# Patient Record
Sex: FEMALE | Race: WHITE | NOT HISPANIC OR LATINO | Employment: OTHER | URBAN - METROPOLITAN AREA
[De-identification: names, ages, dates, MRNs, and addresses within clinical notes are randomized per-mention and may not be internally consistent; named-entity substitution may affect disease eponyms.]

---

## 2019-03-08 ENCOUNTER — DOCUMENTATION ONLY (OUTPATIENT)
Dept: CARE COORDINATION | Facility: CLINIC | Age: 64
End: 2019-03-08

## 2019-03-22 ENCOUNTER — HEALTH MAINTENANCE LETTER (OUTPATIENT)
Age: 64
End: 2019-03-22

## 2019-03-22 ASSESSMENT — ENCOUNTER SYMPTOMS
ARTHRALGIAS: 1
BACK PAIN: 1

## 2019-04-02 NOTE — PROGRESS NOTES
[facility]  Primary Care Center   Established Patient Encounter   Debi Pizarro MD  04/04/2019     Medicare Annual Wellness Visit     History of Present Illness:   Elisha Gao is a 63 year old female who presents for a medicare wellness check. She has previously undergone a hysterectomy in 1987 for endometriosis and a ruptured ovarian cyst.     The patient reports she is doing well. She has had chronic back pain that has worsened over the past few years with no specific incident or trauma. She mostly has nighttime pain that wakes her up at night. She is unable to lift heavy objects. Her pain is tolerable. She notes that her family has a history of chronic back pain.     She is currently taking Celexa for depression  Activities she enjoys include going up to her cabin, golfing and reading. She has gone on a low carb diet over the past two months and has lost 15 pounds. She receives annual flu shots. She denies any fevers or night sweats.     Review of Systems:   Constitutional:   fevers, night sweats or unintentional weight change ?  NO      Eyes:   vision change, diplopia or red eyes?  NO      Ears, Nose, Mouth, Throat:   tinnitus or hearing change,  epistaxis or nasal discharge,  oral lesions, throat pain ?  NO      Neck:   stiffness?  NO           Cardiovascular:   chest pain, palpitations, or pain with walking, orthopnea or PND?  NO   Breasts:  Any bumps or unusual discharge?     NO         Respiratory:   dyspnea, cough, shortness of breath or wheezing?  NO         GI:   nausea, vomiting, diarrhea or constipation,  abdominal pain ?  NO         :   change in urine,  dysuria or hematuria,  sexual dysfunction ?  NO        Musculoskeletal:   joint or muscle pain or swelling? YES, low back pain          Skin:   concerning lesions or moles?  NO           Nervous System:   loss of strength or sensation,  numbness or tingling,  tremor,  dizziness,  headache?  NO   Endocrine/Hormone:   polyuria or polydipsia,   temperature intolerance?  NO            Blood and Lymphnodes:   concerning bumps,  bleeding problems?  NO            Allergy:   environmental allergies?  NO            Mental Health:   depression or anxiety,  sleep problems?  NO              Medical Care      Have you been to an ER or a hospital in the last year? No  What other specialists or organizations are involved in your medical care?  No  Current providers sharing in care for this patient include:  Patient Care Team:  Hayes Marshall MD as PCP - General (Family Practice)    Active Medications:     Current Outpatient Medications:      citalopram (CELEXA) 40 MG tablet, Take 40 mg by mouth, Disp: , Rfl:      magnesium 250 MG tablet, Take 1 tablet by mouth daily, Disp: , Rfl:      vitamin D3 (CHOLECALCIFEROL) 1000 units (25 mcg) tablet, Take 1,000 Units by mouth daily, Disp: , Rfl:       Allergies:   Patient has no known allergies.      Past Medical History:  No past medical history on file.     Past Surgical History:  Hysterectomy - 1987    Family History:   Father - heart attack, age 62  Mother - colon cancer (70's) and lung cancer (90's)  Brother - bipolar disorder      Social History:   Patient presents in clinic alone   Admits to prior tobacco use (quit in 1987, 0.5 packs/day for 20 years)  Admits to 3 glasses of wine/week.  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      Marital Status:  Who lives in your household? wife  Does your home have any of the following safety concerns? Loose rugs in the hallway, no grab bars in the bathroom, no handrails on the stairs or have poorly lit areas?  No  Do you feel threatened or controlled by a partner, ex-partner or anyone in your life? No  Has anyone hurt you physically, for example by pushing, hitting, slapping or kicking you   or forcing you to have sex? No  Do you need help with the phone, transportation, shopping, preparing meals, housework, laundry, medications or managing money? No   Have you noticed  "any hearing difficulties? No      Risk Behaviors and Healthy Habits      How many servings of fruits and vegetables do you eat a day? 2 serving of both.    How often do you exercise and what do you do? 5 days a week, cardio and strength at gym  Do you frequently ride without a seatbelt? Yes   Do you use tobacco?  No  Do you use any other drugs? No                                                                                                             Do you use alcohol? Yes, three glasses of wine a week    Today's PHQ-2 Score:       Sexual Health      Are you sexually active?  Yes    If yes, with men, women, or both? Female  If yes, do you more than one current partner?No  If yes, are you using condoms?  No  Have you had any sexually transmitted infections? No   Any sexual concerns? No      FOR WOMEN  What year did you stop having periods? age 30   Any vaginal bleeding in the last year? No  Have you ever had an abnormal Pap smear? No     FUNCTIONAL ABILITY/SAFETY SCREENING      Was the patient's timed Up & Go test (Get up from chair walk, 10 feet turn, return to chair and sit down) unsteady or longer than 30 seconds? No     Hearing evaluation if done: N/A     EVALUATION OF COGNITIVE FUNCTION      Mood/affect:Normal  Appearance:Normal  Family member/caregiver input: Normal  Mini Cog Scoring   3 points  Clock Draw Test result:  Normal     SCREENING FOR PREVENTION and EARLY DETECTION      ECG: Not indicated.      Corrected Visual acuity:See nursing notes     CV Risk based on Pooled Cohort Risk:The ASCVD Risk score ([place] NESSA Jr, et al., 2013) failed to calculate for the following reasons:    The 2013 ASCVD risk score is only valid for ages 40 to 79     Advanced Directives: Not discussed     There is no immunization history on file for this patient.    Physical Exam:   /88   Pulse 78   Ht 1.635 m (5' 4.37\")   Wt 55.6 kg (122 lb 8 oz)   SpO2 97%   BMI 20.79 kg/m     Constitutional: Alert, oriented, pleasant, " no acute distress  Head: Normocephalic, atraumatic  Eyes: Extra-ocular movements intact, no scleral icterus  ENT: Oropharynx clear, moist mucus membranes, good dentition  Neck: Supple, no lymphadenopathy, no thyromegaly   Cardiovascular: Regular rate and rhythm, no murmurs, rubs or gallops, peripheral pulses full/symmetric  Respiratory: Good air movement bilaterally, lungs clear, no wheezes/rales/rhonchi  GI: Abdomen soft, bowel sounds present, nondistended, nontender, no organomegaly or masses, no rebound/guarding  Musculoskeletal: No edema, normal muscle tone, normal gait, subjective tenderness over lower thoracic and upper lumbar spine without radiation or focal abnormalities, straight leg raise test negative bilaterally.   Neurologic: Alert and oriented, cranial nerves 2-12 intact.  Skin: No rashes/lesions  Psychiatric: normal mentation, affect and mood    Assessment and Plan:  Initial Medicare Wellness Visit       ICD-10-CM    1. Routine general medical examination at a health care facility Z00.00 CBC with platelets     Comprehensive metabolic panel     TSH with free T4 reflex   2. Screening for hyperlipidemia Z13.220 Lipid panel reflex to direct LDL Fasting   3. Screening for diabetes mellitus Z13.1 Hemoglobin A1c   4. Chronic midline low back pain without sciatica M54.5 XR Thoracic Spine 2 Views    G89.29 XR Lumbar Spine 2-3 Views   5. Major depressive disorder in partial remission, unspecified whether recurrent (H) F32.4 citalopram (CELEXA) 40 MG tablet   6. Screening for osteoporosis Z13.820 Dexa hip/pelvis/spine*   7. Asymptomatic postmenopausal status Z78.0 Dexa hip/pelvis/spine*   8. Special screening for malignant neoplasms, colon Z12.11 GASTROENTEROLOGY ADULT REF PROCEDURE ONLY      PLAN:  1. Reviewed Preventive Services with patient and preventive service plan is as below., Lipid panel ordered. , Dexa Scan ordered., Mammogram ordered. and Colonoscopy ordered.   2. Lower back pain, chronic for two  years without preceding trauma or red flag symptoms. Will start with imaging patient is amenable to sports medicine or physical therapy referral as appropriate.     #Routine Health Maintenence:  Depression Screening:   PHQ-2 ( 1999 Pfizer) 4/4/2019   Q1: Little interest or pleasure in doing things 0   Q2: Feeling down, depressed or hopeless 0   PHQ-2 Score 0   Q1: Little interest or pleasure in doing things Not at all   Q2: Feeling down, depressed or hopeless Not at all   PHQ-2 Score 0     Immunizations (zoster, pneumovax, flu, Tdap, Hep A/B):   Lung Ca Screening (>30 pk age 55-79 or >20 py age 50-79 + RF): does not qualify  Colonoscopy (50-75 yrs): 2012, due 5 years  Dexa (>65W or 70M yrs): 12/11 (on meds for 6 months)  RESULT: LUMBAR SPINE  BMD (gm/cm2): 0.959  T score: -0.88  RESULT: HIP  BMD (gm/cm2): 0.724  T score: -2.25  FRAX RISK CALCULATION    10 YEAR TOTAL FRACTURE RISK 7 %   10 YEAR HIP FRACTURE RISK 1 %  Mammogram (40-75 yrs): done today  Pap (21-65 yrs): not indicated  GC/Chlam (<25 yrs): not indicated  HIV/HCV if risk factors: ordered  Tob/EtOH: screen neg  Lifestyle factors: reviewed  Advanced Directive: deferred        Follow-up: Return to clinic in annually or PRN. .      Radiograph of the lower back to evaluate the alignment  compression fracture. IF the radiograph is normal, I will refer her to sports medicine for further evaluation.      Scribe Disclosure:   I, Leander Burton, am serving as a scribe to document services personally performed by Debi Pizarro MD at this visit, based upon the provider's statements to me. All documentation has been reviewed by the aforementioned provider prior to being entered into the official medical record.     Portions of this medical record were completed by a scribe. UPON MY REVIEW AND AUTHENTICATION BY ELECTRONIC SIGNATURE, this confirms (a) I performed the applicable clinical services, and (b) the record is accurate.  Debi Pizarro MD  Internal  Medicine      Answers for HPI/ROS submitted by the patient on 3/22/2019   General Symptoms: No  Skin Symptoms: No  HENT Symptoms: No  EYE SYMPTOMS: No  HEART SYMPTOMS: No  LUNG SYMPTOMS: No  INTESTINAL SYMPTOMS: No  URINARY SYMPTOMS: No  GYNECOLOGIC SYMPTOMS: No  BREAST SYMPTOMS: No  SKELETAL SYMPTOMS: Yes  BLOOD SYMPTOMS: No  NERVOUS SYSTEM SYMPTOMS: No  MENTAL HEALTH SYMPTOMS: No  Back pain: Yes  Joint pain: Yes

## 2019-04-04 ENCOUNTER — ANCILLARY PROCEDURE (OUTPATIENT)
Dept: GENERAL RADIOLOGY | Facility: CLINIC | Age: 64
End: 2019-04-04
Attending: INTERNAL MEDICINE
Payer: MEDICARE

## 2019-04-04 ENCOUNTER — ANCILLARY PROCEDURE (OUTPATIENT)
Dept: MAMMOGRAPHY | Facility: CLINIC | Age: 64
End: 2019-04-04
Payer: MEDICARE

## 2019-04-04 ENCOUNTER — TELEPHONE (OUTPATIENT)
Dept: GASTROENTEROLOGY | Facility: OUTPATIENT CENTER | Age: 64
End: 2019-04-04

## 2019-04-04 ENCOUNTER — ANCILLARY PROCEDURE (OUTPATIENT)
Dept: BONE DENSITY | Facility: CLINIC | Age: 64
End: 2019-04-04
Attending: INTERNAL MEDICINE
Payer: MEDICARE

## 2019-04-04 ENCOUNTER — OFFICE VISIT (OUTPATIENT)
Dept: INTERNAL MEDICINE | Facility: CLINIC | Age: 64
End: 2019-04-04
Payer: MEDICARE

## 2019-04-04 ENCOUNTER — TELEPHONE (OUTPATIENT)
Dept: GASTROENTEROLOGY | Facility: CLINIC | Age: 64
End: 2019-04-04

## 2019-04-04 VITALS
OXYGEN SATURATION: 97 % | BODY MASS INDEX: 20.92 KG/M2 | HEIGHT: 64 IN | HEART RATE: 78 BPM | WEIGHT: 122.5 LBS | SYSTOLIC BLOOD PRESSURE: 125 MMHG | DIASTOLIC BLOOD PRESSURE: 88 MMHG

## 2019-04-04 DIAGNOSIS — Z00.00 ROUTINE GENERAL MEDICAL EXAMINATION AT A HEALTH CARE FACILITY: ICD-10-CM

## 2019-04-04 DIAGNOSIS — Z13.220 SCREENING FOR HYPERLIPIDEMIA: ICD-10-CM

## 2019-04-04 DIAGNOSIS — M54.50 CHRONIC MIDLINE LOW BACK PAIN WITHOUT SCIATICA: ICD-10-CM

## 2019-04-04 DIAGNOSIS — F32.4 MAJOR DEPRESSIVE DISORDER IN PARTIAL REMISSION, UNSPECIFIED WHETHER RECURRENT (H): ICD-10-CM

## 2019-04-04 DIAGNOSIS — Z00.00 ROUTINE GENERAL MEDICAL EXAMINATION AT A HEALTH CARE FACILITY: Primary | ICD-10-CM

## 2019-04-04 DIAGNOSIS — Z13.820 SCREENING FOR OSTEOPOROSIS: ICD-10-CM

## 2019-04-04 DIAGNOSIS — Z78.0 ASYMPTOMATIC POSTMENOPAUSAL STATUS: ICD-10-CM

## 2019-04-04 DIAGNOSIS — G89.29 CHRONIC MIDLINE LOW BACK PAIN WITHOUT SCIATICA: ICD-10-CM

## 2019-04-04 DIAGNOSIS — Z12.11 ENCOUNTER FOR SCREENING COLONOSCOPY: Primary | ICD-10-CM

## 2019-04-04 DIAGNOSIS — Z12.11 SPECIAL SCREENING FOR MALIGNANT NEOPLASMS, COLON: ICD-10-CM

## 2019-04-04 DIAGNOSIS — Z12.31 VISIT FOR SCREENING MAMMOGRAM: ICD-10-CM

## 2019-04-04 DIAGNOSIS — Z13.1 SCREENING FOR DIABETES MELLITUS: ICD-10-CM

## 2019-04-04 LAB
ALBUMIN SERPL-MCNC: 4.1 G/DL (ref 3.4–5)
ALP SERPL-CCNC: 67 U/L (ref 40–150)
ALT SERPL W P-5'-P-CCNC: 17 U/L (ref 0–50)
ANION GAP SERPL CALCULATED.3IONS-SCNC: 8 MMOL/L (ref 3–14)
AST SERPL W P-5'-P-CCNC: 13 U/L (ref 0–45)
BILIRUB SERPL-MCNC: 0.9 MG/DL (ref 0.2–1.3)
BUN SERPL-MCNC: 23 MG/DL (ref 7–30)
CALCIUM SERPL-MCNC: 9.4 MG/DL (ref 8.5–10.1)
CHLORIDE SERPL-SCNC: 106 MMOL/L (ref 94–109)
CHOLEST SERPL-MCNC: 225 MG/DL
CO2 SERPL-SCNC: 24 MMOL/L (ref 20–32)
CREAT SERPL-MCNC: 0.82 MG/DL (ref 0.52–1.04)
ERYTHROCYTE [DISTWIDTH] IN BLOOD BY AUTOMATED COUNT: 13.1 % (ref 10–15)
GFR SERPL CREATININE-BSD FRML MDRD: 75 ML/MIN/{1.73_M2}
GLUCOSE SERPL-MCNC: 87 MG/DL (ref 70–99)
HBA1C MFR BLD: 5.8 % (ref 0–5.6)
HCT VFR BLD AUTO: 43.8 % (ref 35–47)
HDLC SERPL-MCNC: 60 MG/DL
HGB BLD-MCNC: 14.6 G/DL (ref 11.7–15.7)
LDLC SERPL CALC-MCNC: 144 MG/DL
MCH RBC QN AUTO: 31 PG (ref 26.5–33)
MCHC RBC AUTO-ENTMCNC: 33.3 G/DL (ref 31.5–36.5)
MCV RBC AUTO: 93 FL (ref 78–100)
NONHDLC SERPL-MCNC: 166 MG/DL
PLATELET # BLD AUTO: 208 10E9/L (ref 150–450)
POTASSIUM SERPL-SCNC: 4.1 MMOL/L (ref 3.4–5.3)
PROT SERPL-MCNC: 7.2 G/DL (ref 6.8–8.8)
RBC # BLD AUTO: 4.71 10E12/L (ref 3.8–5.2)
SODIUM SERPL-SCNC: 138 MMOL/L (ref 133–144)
TRIGL SERPL-MCNC: 108 MG/DL
TSH SERPL DL<=0.005 MIU/L-ACNC: 2.03 MU/L (ref 0.4–4)
WBC # BLD AUTO: 6.8 10E9/L (ref 4–11)

## 2019-04-04 RX ORDER — MULTIVITAMIN WITH IRON
1 TABLET ORAL EVERY MORNING
COMMUNITY

## 2019-04-04 RX ORDER — CITALOPRAM HYDROBROMIDE 40 MG/1
40 TABLET ORAL
COMMUNITY
Start: 2015-08-05 | End: 2019-04-04

## 2019-04-04 RX ORDER — CITALOPRAM HYDROBROMIDE 40 MG/1
40 TABLET ORAL DAILY
Qty: 90 TABLET | Refills: 3 | Status: SHIPPED | OUTPATIENT
Start: 2019-04-04 | End: 2020-03-25

## 2019-04-04 ASSESSMENT — MIFFLIN-ST. JEOR: SCORE: 1096.53

## 2019-04-04 NOTE — NURSING NOTE
VISION TEST    Right: 20/70  Left: 20/25      Pt stated she is planning on going to the eye doctor soon for an exam.     PRECIOUS Toussaint at 10:35 AM on 4/4/2019

## 2019-04-04 NOTE — PATIENT INSTRUCTIONS
Primary Care Center Phone Number: [phone]   [facility] Medication Refill Request Information:  * Please contact your pharmacy regarding ANY request for medication refills.  ** [facility] Prescription Fax = [phone]  * Please allow 3 business days for routine medication refills.  * Please allow 5 business days for controlled substance medication refills.     Primary Care Center Test Result notification information:  *You will be notified with in 7-10 days of your appointment day regarding the results of your test.  If you are on MyChart you will be notified as soon as the provider has reviewed the results and signed off on them.        Dear patient,    Your health care provider has recommended that you receive the new shingles vaccine  called Shingrix to prevent shingles. Many private pay insurance and Medicare Part D cover Shingrix. However, not all insurance carriers cover the entire cost of the Shingrix vaccine if the vaccine is administered at your primary care clinic.    Prior to receiving the vaccine, we recommend that you call your insurance carrier and  ask them the following questions:    1. Does my insurance cover the Shingrix vaccine and the administration of the  vaccine?  2. What is my co-pay or deductible for the vaccine?  3. Is there a cost difference if I receive the vaccine at my doctor s office or a  Pharmacy?    The clinic cannot determine your insurance benefits. Please call your insurance provider  prior to scheduling an appointment to receive the Shingrix vaccine. If you decide to  receive your vaccine at the Primary Care Center, please call [phone] and  request a nurse-only appointment for the Shingrix vaccine. The vaccine comes in two doses. Your second dose should be 2-6 months after your first Shingrix injections.  Nurse visit hours are available Monday, Wednesday, and Friday from 9-11:00 AM and 1:00-3:00PM.    Sincerely,    The Primary Care Team    Imaging  Dept.  [phone]  ([facility])  [phone]  [place] .... ([place], [place], [place])  [phone]  [place] .... [place] ([facility]) and [place] ([facility])      GI Procedure (Colonoscopy /Upper GI/ Endoscopy)  [phone]  The clinic will call you, but if you have not heard from the clinic please call the number provided.

## 2019-04-04 NOTE — TELEPHONE ENCOUNTER
Patient taking any blood thinners ? No     Heart disease ? Denies     Lung disease ?Denies       Sleep apnea ?Denies     Diabetic ?Denies     Kidney disease ?Denies     Electronic implanted medical devices ?Denies     Are you taking any narcotic pain medication ?  No  What is your daily dosage ?    PTSD ? N/a     Prep instructions reviewed with patient ? Patient declined review.  policy, MAC sedation plan reviewed. Advised patient to have someone stay with her post exam    Pharmacy : [facility]    Indication for procedure : Screening colonoscopy    Referring provider :Debi Pizarro MD     Arrival Time : 1:30 PM

## 2019-04-04 NOTE — NURSING NOTE
Chief Complaint   Patient presents with     Establish Care     Pt is here to establish care with a new PCP     Wellness Visit     Pt is here for a medicare wellness visit       Paula Toussaint EMT at 9:44 AM on 4/4/2019

## 2019-04-04 NOTE — PROGRESS NOTES
#Routine Health Maintenence:  Depression Screening:   PHQ-2 ( 1999 Pfizer) 4/4/2019   Q1: Little interest or pleasure in doing things 0   Q2: Feeling down, depressed or hopeless 0   PHQ-2 Score 0   Q1: Little interest or pleasure in doing things Not at all   Q2: Feeling down, depressed or hopeless Not at all   PHQ-2 Score 0     Immunizations (zoster, pneumovax, flu, Tdap, Hep A/B):   Lung Ca Screening (>30 pk age 55-79 or >20 py age 50-79 + RF): does not qualify  Colonoscopy (50-75 yrs): 5 years colonoscopy 2-12  Dexa (>65W or 70M yrs): 12/11 (on meds for 6 months)  RESULT: LUMBAR SPINE  BMD (gm/cm2): 0.959  T score: -0.88  RESULT: HIP  BMD (gm/cm2): 0.724  T score: -2.25  FRAX RISK CALCULATION    10 YEAR TOTAL FRACTURE RISK 7 %   10 YEAR HIP FRACTURE RISK 1 %  Mammogram (40-75 yrs): done today  Pap (21-65 yrs): not indicated  GC/Chlam (<25 yrs):  HIV/HCV if risk factors:  Tob/EtOH:  Lifestyle factors:  Advanced Directive:      colonscopy  DEXA  xray  Lipid  pcv  shingrix info  refills

## 2019-04-10 ENCOUNTER — APPOINTMENT (OUTPATIENT)
Dept: GASTROENTEROLOGY | Facility: OUTPATIENT CENTER | Age: 64
End: 2019-04-10
Payer: MEDICARE

## 2019-04-10 ENCOUNTER — TRANSFERRED RECORDS (OUTPATIENT)
Dept: HEALTH INFORMATION MANAGEMENT | Facility: CLINIC | Age: 64
End: 2019-04-10

## 2019-06-04 ENCOUNTER — OFFICE VISIT (OUTPATIENT)
Dept: OPHTHALMOLOGY | Facility: CLINIC | Age: 64
End: 2019-06-04
Payer: MEDICARE

## 2019-06-04 DIAGNOSIS — Z98.890 S/P LASIK SURGERY: ICD-10-CM

## 2019-06-04 DIAGNOSIS — H52.4 PRESBYOPIA OF BOTH EYES: Primary | ICD-10-CM

## 2019-06-04 DIAGNOSIS — H02.88A MEIBOMIAN GLAND DYSFUNCTION (MGD) OF UPPER AND LOWER LIDS OF BOTH EYES: ICD-10-CM

## 2019-06-04 DIAGNOSIS — H02.88B MEIBOMIAN GLAND DYSFUNCTION (MGD) OF UPPER AND LOWER LIDS OF BOTH EYES: ICD-10-CM

## 2019-06-04 DIAGNOSIS — H31.002 RETINAL SCAR OF LEFT EYE: ICD-10-CM

## 2019-06-04 ASSESSMENT — SLIT LAMP EXAM - LIDS
COMMENTS: 1+ MGD
COMMENTS: 1+ MGD

## 2019-06-04 ASSESSMENT — CONF VISUAL FIELD
OD_NORMAL: 1
OS_NORMAL: 1
METHOD: COUNTING FINGERS

## 2019-06-04 ASSESSMENT — CUP TO DISC RATIO
OS_RATIO: 0.3
OD_RATIO: 0.3

## 2019-06-04 ASSESSMENT — TONOMETRY
IOP_METHOD: ICARE
OD_IOP_MMHG: 10
OS_IOP_MMHG: 11

## 2019-06-04 ASSESSMENT — VISUAL ACUITY
OD_SC: 20/20
METHOD: SNELLEN - LINEAR
OD_SC+: -
OS_SC: 20/20

## 2019-06-04 ASSESSMENT — REFRACTION_MANIFEST
OD_SPHERE: PLANO
OS_ADD: +2.50
OD_AXIS: 005
OS_SPHERE: PLANO
OD_CYLINDER: +0.25
OD_ADD: +2.50
OS_CYLINDER: SPHERE

## 2019-06-04 ASSESSMENT — EXTERNAL EXAM - LEFT EYE: OS_EXAM: NORMAL

## 2019-06-04 ASSESSMENT — REFRACTION_WEARINGRX
OS_CYLINDER: SPHERE
OD_CYLINDER: SPHERE
OD_SPHERE: +2.50
SPECS_TYPE: SVL-READERS
OS_SPHERE: +2.50

## 2019-06-04 ASSESSMENT — EXTERNAL EXAM - RIGHT EYE: OD_EXAM: NORMAL

## 2019-06-04 NOTE — PROGRESS NOTES
History  HPI     Annual Eye Exam     In left eye.  This started years ago.  It is worse throughout the day.  Since onset it is stable.  Associated symptoms include floaters and headache.  Treatments tried include artificial tears.              Comments     Lots of floaters left eye for several years. No flashes of lights. Wears OTC readers. Headaches/pain of left eye, off and on, ongoing for a few years.  Uses ATs PRN    Ania Rg COT 7:28 AM June 4, 2019             Last edited by Ania Rg on 6/4/2019  7:29 AM. (History)          Assessment/Plan  (H52.4) Presbyopia of both eyes  (primary encounter diagnosis)  Comment: Presbyopia both eyes, minimal distance refractive error  Plan: REFRACTION         Educated patient on condition and clinical findings. Dispensed spectacle prescription for full time wear. Educated patient on possibility of adaptation period, if symptoms do not improve return to clinic for further testing.    (Z98.890) S/P LASIK surgery  Comment: Stable  Plan:  Monitor annually.    (H02.88A,  H02.88B) Meibomian gland dysfunction (MGD) of upper and lower lids of both eyes  Comment: Longstanding irritation left eye > right eye   Plan:  Recommended warm compresses twice each day for ten minutes. Monitor annually, or sooner if symptoms worsen.    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 If symptoms present, return to clinic immediately.    Return to clinic in 1 year for comprehensive eye exam.    Complete documentation of historical and exam elements from today's encounter can  be found in the full encounter summary report (not reduplicated in this progress  note). I personally obtained the chief complaint(s) and history of present illness. I  confirmed and edited as necessary the review of systems, past  medical/surgical  history, family history, social history, and examination findings as documented by  others; and I examined the patient myself. I personally reviewed the relevant tests,  images, and reports as documented above. I formulated and edited as necessary the  assessment and plan and discussed the findings and management plan with the  patient and family.    Scar Mart OD, FAAO

## 2019-06-04 NOTE — NURSING NOTE
Chief Complaints and History of Present Illnesses   Patient presents with     Annual Eye Exam     Chief Complaint(s) and History of Present Illness(es)     Annual Eye Exam     Laterality: left eye    Onset: years ago    Timing: throughout the day    Course: stable    Associated symptoms: floaters and headache    Treatments tried: artificial tears              Comments     Lots of floaters left eye for several years. No flashes of lights. Wears OTC readers. Headaches/pain of left eye, off and on, ongoing for a few years.  Uses ATs DEE Rg COT 7:28 AM June 4, 2019

## 2019-10-04 ENCOUNTER — HEALTH MAINTENANCE LETTER (OUTPATIENT)
Age: 64
End: 2019-10-04

## 2019-11-01 ENCOUNTER — HEALTH MAINTENANCE LETTER (OUTPATIENT)
Age: 64
End: 2019-11-01

## 2020-01-02 NOTE — TELEPHONE ENCOUNTER
DIAGNOSIS: R: Conditions - Back - Chronic Rubina Pain (longer than six weeks), I: [employer], S: [id number], , Seen provider 3 years:No,Previous surgery:No,WC/MVA:No,Seen by other provider:No,:1955,   APPOINTMENT DATE: 1/15   NOTES STATUS DETAILS   OFFICE NOTE from referring provider N/A    OFFICE NOTE from other specialist Internal  Logeais, Debi Rossi MD    19   DISCHARGE SUMMARY from hospital N/A    DISCHARGE REPORT from the ER N/A    OPERATIVE REPORT N/A    MEDICATION LIST Internal    MRI N/A    dexa scan internal 19   XRAYS (IMAGES & REPORTS) Internal 19

## 2020-01-15 ENCOUNTER — OFFICE VISIT (OUTPATIENT)
Dept: ORTHOPEDICS | Facility: CLINIC | Age: 65
End: 2020-01-15
Payer: MEDICARE

## 2020-01-15 ENCOUNTER — PRE VISIT (OUTPATIENT)
Dept: ORTHOPEDICS | Facility: CLINIC | Age: 65
End: 2020-01-15

## 2020-01-15 VITALS — BODY MASS INDEX: 21.51 KG/M2 | HEIGHT: 64 IN | WEIGHT: 126 LBS | RESPIRATION RATE: 16 BRPM

## 2020-01-15 DIAGNOSIS — M85.80 OSTEOPENIA AFTER MENOPAUSE: ICD-10-CM

## 2020-01-15 DIAGNOSIS — M85.80 OSTEOPENIA AFTER MENOPAUSE: Primary | ICD-10-CM

## 2020-01-15 DIAGNOSIS — Z78.0 OSTEOPENIA AFTER MENOPAUSE: Primary | ICD-10-CM

## 2020-01-15 DIAGNOSIS — Z78.0 OSTEOPENIA AFTER MENOPAUSE: ICD-10-CM

## 2020-01-15 DIAGNOSIS — M47.816 LUMBAR SPONDYLOSIS: ICD-10-CM

## 2020-01-15 LAB — DEPRECATED CALCIDIOL+CALCIFEROL SERPL-MC: 34 UG/L (ref 20–75)

## 2020-01-15 PROCEDURE — 82306 VITAMIN D 25 HYDROXY: CPT | Performed by: FAMILY MEDICINE

## 2020-01-15 ASSESSMENT — MIFFLIN-ST. JEOR: SCORE: 1107.4

## 2020-01-15 NOTE — PROGRESS NOTES
Subjective:   Elisha Gao is a 64 year old female who presents low back pain. No KEL. She likes to walk and golf.   Chronic pain for 10 years or more, worse in past 6 months.  Sometimes it's a 10/10.  Worse if landing hard and pain shoots up her back.  Trying to work out but then pays for it over next 2-3 days.  Ibuprofen, didn't have decent insurance, even PT would've cost her.  Mainly walking, started strengthening- joined [employer].  Wakes up 5 times a night if not more.  Takes Ibuprofen in the middle of the night.  Ibuprofen 600mg BID  No compression fractures on Lumbar x-ray, Thoracic x-ray no bony abnormalities.  DEXA low bone density- calcium 1200mg and vit D taken. Vit D 2000 international unit(s)- on for the past 2 months  Diet- milk, dairy, cheese, yogurt, on keto off and on  Mom and brother with hx of back surgeries.  Fosamax- GI upset, can't recall dose, hyst at age 33, no kids, ABD surgery- Hyst, classic incision    Background:   Date of injury: NA   Duration of symptoms: 10 years  Mechanism of Injury: Chronic; Unknown   Aggravating factors: twisting, landing hard off a curb, lifting.   Relieving Factors: rest  Prior Evaluation: Prior Physician Evalutation:  and X-rays    PAST MEDICAL, SOCIAL, SURGICAL AND FAMILY HISTORY: She  has a past medical history of Chronic low back pain and Depression.  She  has a past surgical history that includes Hysterectomy (1987) and Lasik (Bilateral).  Her family history includes Bipolar Disorder in her brother; Colon Cancer in her mother; Heart Disease in her father; Lung Cancer in her mother; Macular Degeneration in her mother.  She reports that she quit smoking about 33 years ago. She has a 10.00 pack-year smoking history. She has never used smokeless tobacco. She reports current alcohol use of about 3.0 standard drinks of alcohol per week.    ALLERGIES: She has No Known Allergies.    CURRENT MEDICATIONS: She has a current medication list which includes  "the following prescription(s): citalopram, magnesium, and vitamin d3.     REVIEW OF SYSTEMS: 10 point review of systems is negative except as noted above.     Exam:   Resp 16   Ht 1.635 m (5' 4.37\")   Wt 57.2 kg (126 lb)   BMI 21.38 kg/m             CONSTITUTIONAL: healthy, alert, mild distress and cooperative  HEAD: Normocephalic. No masses, lesions, tenderness or abnormalities  SKIN: no suspicious lesions or rashes  GAIT: normal  NEUROLOGIC: Non-focal  PSYCHIATRIC: affect normal/bright and mentation appears normal.    MUSCULOSKELETAL: chronic LBP  Tender:  lumbar spinous processes, left para lumbar muscles, right para lumbar muscles  Non-tender:  thoracic spinous processes, left parathoracic muscles, right parathoracic muscles  Range of Motion:  left lateral thoracic bending   decreased, right lateral thoracic bending  decreased, left thoracic rotation  painful, right thoracic rotation  painful, lumbar flexion  full, lumbar extension  decreased, painful  Strength:  able to heel walk, able to toe walk  Special tests:  negative straight leg raises    Hip Exam: Hip ROM full, weakness hip abductors, CORE weakness, painful with bridge         Assessment/Plan:   Pt is a 63 yo white female with PMhx of osteopenia presenting with chronic LBP without radiculopathy    1. Chronic LBP without radiculopathy-   Goal: get back to golf  Recommended physical therapy  If not improving consider MRI lumbar  2.osteopenia-  Patient currently taking 1200 mg of calcium and 2000 international units of vitamin D  DEXA: osteopenia  Side effects to Fosamax in the past, GI upset  Will communicate with primary care provider    RTC 6 weeks  X-RAY INTERPRETATION:   Reviewed imaging  "

## 2020-01-15 NOTE — LETTER
1/15/2020      RE: Elisha Gao  [address] [facility] [place] [address]        Subjective:   Elisha Gao is a 64 year old female who presents low back pain. No KEL. She likes to walk and golf.   Chronic pain for 10 years or more, worse in past 6 months.  Sometimes it's a 10/10.  Worse if landing hard and pain shoots up her back.  Trying to work out but then pays for it over next 2-3 days.  Ibuprofen, didn't have decent insurance, even PT would've cost her.  Mainly walking, started strengthening- joined [employer].  Wakes up 5 times a night if not more.  Takes Ibuprofen in the middle of the night.  Ibuprofen 600mg BID  No compression fractures on Lumbar x-ray, Thoracic x-ray no bony abnormalities.  DEXA low bone density- calcium 1200mg and vit D taken. Vit D 2000 international unit(s)- on for the past 2 months  Diet- milk, dairy, cheese, yogurt, on keto off and on  Mom and brother with hx of back surgeries.  Fosamax- GI upset, can't recall dose, hyst at age 33, no kids, ABD surgery- Hyst, classic incision    Background:   Date of injury: NA   Duration of symptoms: 10 years  Mechanism of Injury: Chronic; Unknown   Aggravating factors: twisting, landing hard off a curb, lifting.   Relieving Factors: rest  Prior Evaluation: Prior Physician Evalutation:  and X-rays    PAST MEDICAL, SOCIAL, SURGICAL AND FAMILY HISTORY: She  has a past medical history of Chronic low back pain and Depression.  She  has a past surgical history that includes Hysterectomy (1987) and Lasik (Bilateral).  Her family history includes Bipolar Disorder in her brother; Colon Cancer in her mother; Heart Disease in her father; Lung Cancer in her mother; Macular Degeneration in her mother.  She reports that she quit smoking about 33 years ago. She has a 10.00 pack-year smoking history. She has never used smokeless tobacco. She reports current alcohol use of about 3.0 standard drinks of alcohol per week.    ALLERGIES: She  "has No Known Allergies.    CURRENT MEDICATIONS: She has a current medication list which includes the following prescription(s): citalopram, magnesium, and vitamin d3.     REVIEW OF SYSTEMS: 10 point review of systems is negative except as noted above.     Exam:   Resp 16   Ht 1.635 m (5' 4.37\")   Wt 57.2 kg (126 lb)   BMI 21.38 kg/m              CONSTITUTIONAL: healthy, alert, mild distress and cooperative  HEAD: Normocephalic. No masses, lesions, tenderness or abnormalities  SKIN: no suspicious lesions or rashes  GAIT: normal  NEUROLOGIC: Non-focal  PSYCHIATRIC: affect normal/bright and mentation appears normal.    MUSCULOSKELETAL: chronic LBP  Tender:  lumbar spinous processes, left para lumbar muscles, right para lumbar muscles  Non-tender:  thoracic spinous processes, left parathoracic muscles, right parathoracic muscles  Range of Motion:  left lateral thoracic bending   decreased, right lateral thoracic bending  decreased, left thoracic rotation  painful, right thoracic rotation  painful, lumbar flexion  full, lumbar extension  decreased, painful  Strength:  able to heel walk, able to toe walk  Special tests:  negative straight leg raises    Hip Exam: Hip ROM full, weakness hip abductors, CORE weakness, painful with bridge         Assessment/Plan:   Pt is a 65 yo white female with PMhx of osteopenia presenting with chronic LBP without radiculopathy    1. Chronic LBP without radiculopathy-   Goal: get back to golf  Recommended physical therapy  If not improving consider MRI lumbar  2.osteopenia-  Patient currently taking 1200 mg of calcium and 2000 international units of vitamin D  DEXA: osteopenia  Side effects to Fosamax in the past, GI upset  Will communicate with primary care provider    RTC 6 weeks  X-RAY INTERPRETATION:   Reviewed imaging    Jia Mcgee MD    "

## 2020-01-17 ENCOUNTER — THERAPY VISIT (OUTPATIENT)
Dept: PHYSICAL THERAPY | Facility: CLINIC | Age: 65
End: 2020-01-17
Attending: FAMILY MEDICINE
Payer: MEDICARE

## 2020-01-17 DIAGNOSIS — Z78.0 OSTEOPENIA AFTER MENOPAUSE: ICD-10-CM

## 2020-01-17 DIAGNOSIS — M54.50 CHRONIC BILATERAL LOW BACK PAIN WITHOUT SCIATICA: ICD-10-CM

## 2020-01-17 DIAGNOSIS — M85.80 OSTEOPENIA AFTER MENOPAUSE: ICD-10-CM

## 2020-01-17 DIAGNOSIS — G89.29 CHRONIC BILATERAL LOW BACK PAIN WITHOUT SCIATICA: ICD-10-CM

## 2020-01-17 PROCEDURE — 97110 THERAPEUTIC EXERCISES: CPT | Mod: GP | Performed by: PHYSICAL THERAPIST

## 2020-01-17 PROCEDURE — 97161 PT EVAL LOW COMPLEX 20 MIN: CPT | Mod: GP | Performed by: PHYSICAL THERAPIST

## 2020-01-17 NOTE — LETTER
DEPARTMENT OF HEALTH AND HUMAN SERVICES  [facility]    PLAN/UPDATED PLAN OF PROGRESS FOR OUTPATIENT REHABILITATION    PATIENTS NAME:  Elisha Gao   : 1955  PROVIDER NUMBER:    [id number]  HICN: [id number]  PROVIDER NAME: [facility]  MEDICAL RECORD NUMBER: [id number]   START OF CARE DATE:  SOC Date: 20   TYPE:  PT  PRIMARY/TREATMENT DIAGNOSIS: (Pertinent Medical Diagnosis)   Osteopenia after menopause  Chronic bilateral low back pain without sciatica  VISITS FROM START OF CARE:  Rxs Used: 1     Physical Therapy Initial Examination/Evaluation  2020    Elisha Gao is a 64 year old female referred to physical therapy by Jia Mcgee MD for treatment of LBP/osteopenia with Precautions/Restrictions/MD instructions E&T    Therapist Impression:   Elisha presents with findings that will benefit from lumbar flexion directional preference and core strengthening.    Subjective:  DOI/onset: 1/15/2019 DOS: none  Acute Injury or Gradual Onset?: Gradual injury over time  Mechanism of Injury: unknown  Related PMH: 15 years of on/off back pain Previous Treatment: Nothing Effect of prior treatment: NA  Imaging: X-ray and DEXA  Chief Complaint/Functional Limitations:   Issues with sleep, twisting, lifting, stepping off curb (jarring type motion)and see below in therapy evaluation codes   Pain: rest 1-2 /10, activity 10/10 Location: Low back, slightly higher Frequency: Constant Described as: aching; denies numbness/tingling Alleviated by: Tylenol Progression of Symptoms: Gradually getting better. Time of day when pain is worse: Night, Activity related and Position related  Sleeping: Interrupted due to current issue   Occupation: Retired  Job duties: NA  Current HEP/exercise regimen: Walking, some core exercises, [employer]  Patient's goals are see chief complaints     Other pertinent PMH/Red Flags: Pain at Night/Rest   Barriers at  home/work: None as reported by patient  Pertinent Surgical History: Hysterectomy 1987  Medications: Pain  General health as reported by patient: good  Return to MD:  Prn    Lumbar Spine Evaluation      Hip Joint Screen WNL    Trunk Range of Motion  Movement Loss Major Moderate Minimal Nil Pain   Flexion    x    Extension  x      Sidebending R  x   x   Sidebending L  x      Side Gliding R        Side Gliding L          Test Movements   Symptoms During Testing Symptoms After Testing   Pretest symptoms standing     Rep FIS Decreases Better   Rep EIS Nil No effect     Flexibility Quadriceps Hamstrings   Left trivial none/WNL   Right trivial none/WNL     Hip and Knee Strength   MMT Hip Abduction Hip Extension   Left 4/5 4/5   Right 4/5 4/5     Special Tests  Spring testing: Positive L3-L4    Assessment/Plan:  Patient is a 64 year old female with lumbar complaints.    Patient has the following significant findings with corresponding treatment plan.                Diagnosis 1: LBP/osteopenia   Pain -  hot/cold therapy, manual therapy, splint/taping/bracing/orthotics, self management, education, directional preference exercise and home program  Decreased ROM/flexibility - manual therapy and therapeutic exercise  Decreased strength - therapeutic exercise and therapeutic activities    Therapy Evaluation Codes:   1) History comprised of:   Personal factors that impact the plan of care:      None.    Comorbidity factors that impact the plan of care are:      None.     Medications impacting care: None.  2) Examination of Body Systems comprised of:   Body structures and functions that impact the plan of care:      Lumbar spine.   Activity limitations that impact the plan of care are:      Lifting and Sleeping.  3) Clinical presentation characteristics are:   Stable/Uncomplicated.  4) Decision-Making    Low complexity using standardized patient assessment instrument and/or measureable assessment of functional outcome.  Cumulative  "Therapy Evaluation is: Low complexity.    Previous and current functional limitations:  (See Goal Flow Sheet for this information)    Short term and Long term goals: (See Goal Flow Sheet for this information)     Communication ability:  Patient appears to be able to clearly communicate and understand verbal and written communication and follow directions correctly.  Treatment Explanation - The following has been discussed with the patient:   RX ordered/plan of care  Anticipated outcomes  Possible risks and side effects  This patient would benefit from PT intervention to resume normal activities.   Rehab potential is good.    Frequency:  2 X a month, once daily  Duration:  for 3 months  Discharge Plan:  Achieve all LTG.  Independent in home treatment program.  Reach maximal therapeutic benefit.          Caregiver Signature/Credentials _____________________________ Date ________       Treating Provider: Nilay Arias, PT     I have reviewed and certified the need for these services and plan of treatment while under my care.        PHYSICIAN'S SIGNATURE:   _________________________________________  Date___________   Jia Mcgee MD    Certification period:  Beginning of Cert date period: 01/17/20 to  End of Cert period date: 04/15/20     Functional Level Progress Report: Please see attached \"Goal Flow sheet for Functional level.\"    ____X____ Continue Services or       ________ DC Services                Service dates: From  SOC Date: 01/17/20 date to present                         "

## 2020-01-17 NOTE — PROGRESS NOTES
Physical Therapy Initial Examination/Evaluation  January 17, 2020    Elisha Gao is a 64 year old female referred to physical therapy by Jia Mcgee MD for treatment of LBP/osteopenia with Precautions/Restrictions/MD instructions E&T    Therapist Impression:   Elisha presents with findings that will benefit from lumbar flexion directional preference and core strengthening.    Subjective:  DOI/onset: 1/15/2019 DOS: none  Acute Injury or Gradual Onset?: Gradual injury over time  Mechanism of Injury: unknown  Related PMH: 15 years of on/off back pain Previous Treatment: Nothing Effect of prior treatment: NA  Imaging: X-ray and DEXA  Chief Complaint/Functional Limitations:   Issues with sleep, twisting, lifting, stepping off curb (jarring type motion)and see below in therapy evaluation codes   Pain: rest 1-2 /10, activity 10/10 Location: Low back, slightly higher Frequency: Constant Described as: aching; denies numbness/tingling Alleviated by: Tylenol Progression of Symptoms: Gradually getting better. Time of day when pain is worse: Night, Activity related and Position related  Sleeping: Interrupted due to current issue   Occupation: Retired  Job duties: NA  Current HEP/exercise regimen: Walking, some core exercises, [employer]  Patient's goals are see chief complaints     Other pertinent PMH/Red Flags: Pain at Night/Rest   Barriers at home/work: None as reported by patient  Pertinent Surgical History: Hysterectomy 1987  Medications: Pain  General health as reported by patient: good  Return to MD:  Prn    Lumbar Spine Evaluation      Hip Joint Screen WNL    Trunk Range of Motion  Movement Loss Major Moderate Minimal Nil Pain   Flexion    x    Extension  x      Sidebending R  x   x   Sidebending L  x      Side Gliding R        Side Gliding L          Test Movements   Symptoms During Testing Symptoms After Testing   Pretest symptoms standing     Rep FIS Decreases Better   Rep EIS Nil No effect      Flexibility Quadriceps Hamstrings   Left trivial none/WNL   Right trivial none/WNL     Hip and Knee Strength   MMT Hip Abduction Hip Extension   Left 4/5 4/5   Right 4/5 4/5     Special Tests  Spring testing: Positive L3-L4    Assessment/Plan:  Patient is a 64 year old female with lumbar complaints.    Patient has the following significant findings with corresponding treatment plan.                Diagnosis 1: LBP/osteopenia   Pain -  hot/cold therapy, manual therapy, splint/taping/bracing/orthotics, self management, education, directional preference exercise and home program  Decreased ROM/flexibility - manual therapy and therapeutic exercise  Decreased strength - therapeutic exercise and therapeutic activities    Therapy Evaluation Codes:   1) History comprised of:   Personal factors that impact the plan of care:      None.    Comorbidity factors that impact the plan of care are:      None.     Medications impacting care: None.  2) Examination of Body Systems comprised of:   Body structures and functions that impact the plan of care:      Lumbar spine.   Activity limitations that impact the plan of care are:      Lifting and Sleeping.  3) Clinical presentation characteristics are:   Stable/Uncomplicated.  4) Decision-Making    Low complexity using standardized patient assessment instrument and/or measureable assessment of functional outcome.  Cumulative Therapy Evaluation is: Low complexity.    Previous and current functional limitations:  (See Goal Flow Sheet for this information)    Short term and Long term goals: (See Goal Flow Sheet for this information)     Communication ability:  Patient appears to be able to clearly communicate and understand verbal and written communication and follow directions correctly.  Treatment Explanation - The following has been discussed with the patient:   RX ordered/plan of care  Anticipated outcomes  Possible risks and side effects  This patient would benefit from PT  intervention to resume normal activities.   Rehab potential is good.    Frequency:  2 X a month, once daily  Duration:  for 3 months  Discharge Plan:  Achieve all LTG.  Independent in home treatment program.  Reach maximal therapeutic benefit.    Please refer to the daily flowsheet for treatment today, total treatment time and time spent performing 1:1 timed codes.

## 2020-01-31 ENCOUNTER — THERAPY VISIT (OUTPATIENT)
Dept: PHYSICAL THERAPY | Facility: CLINIC | Age: 65
End: 2020-01-31
Payer: MEDICARE

## 2020-01-31 DIAGNOSIS — G89.29 CHRONIC BILATERAL LOW BACK PAIN WITHOUT SCIATICA: ICD-10-CM

## 2020-01-31 DIAGNOSIS — M54.50 CHRONIC BILATERAL LOW BACK PAIN WITHOUT SCIATICA: ICD-10-CM

## 2020-01-31 PROCEDURE — 97110 THERAPEUTIC EXERCISES: CPT | Mod: GP | Performed by: PHYSICAL THERAPIST

## 2020-01-31 NOTE — PROGRESS NOTES
PROGRESS REPORT    Elisha has been in therapy from January 17, 2020 to Jan 31, 2020 for treatment of LBP/osteopenia.    Therapist Impression:  Doing well.  Continue with flexion based exercises.  Follow up as needed.    Subjective:  Subjective: Doing well.  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 Pain - HEP  Decreased ROM/flexibility - HEP  Decreased function - HEP  Decreased strength - HEP  STG/LTGs have been met or progress has been made towards goals:  None  Assessment of Progress: The patient's condition is improving.  Self Management Plans:  Patient has been instructed in a home treatment program.  Patient is independent in a home treatment program.  Patient  has been instructed in self management of symptoms.  Patient is independent in self management of symptoms.  I have re-evaluated this patient and find that the nature, scope, duration and intensity of the therapy is appropriate for the medical condition of the patient.  Elisha continues to require the following intervention to meet STG and LTG's:  PT intervention is no longer required to meet STG/LTG.    Recommendations:  This patient is ready to be discharged from therapy and continue their home treatment program.          Please refer to the daily flowsheet for treatment today, total treatment time and time spent performing 1:1 timed codes.

## 2020-03-04 ENCOUNTER — OFFICE VISIT (OUTPATIENT)
Dept: ORTHOPEDICS | Facility: CLINIC | Age: 65
End: 2020-03-04
Payer: MEDICARE

## 2020-03-04 VITALS — WEIGHT: 126 LBS | BODY MASS INDEX: 21.51 KG/M2 | HEIGHT: 64 IN

## 2020-03-04 DIAGNOSIS — M85.80 OSTEOPENIA AFTER MENOPAUSE: Primary | ICD-10-CM

## 2020-03-04 DIAGNOSIS — Z78.0 OSTEOPENIA AFTER MENOPAUSE: Primary | ICD-10-CM

## 2020-03-04 DIAGNOSIS — M47.816 LUMBAR SPONDYLOSIS: ICD-10-CM

## 2020-03-04 ASSESSMENT — PAIN SCALES - GENERAL: PAINLEVEL: NO PAIN (0)

## 2020-03-04 ASSESSMENT — MIFFLIN-ST. JEOR: SCORE: 1107.4

## 2020-03-04 NOTE — LETTER
"  3/4/2020      RE: Elisha Gao  [address] [facility] [address]        Subjective:   Elisha Gao is a 64 year old female who is f/u for low back pain.  Working with PT, exercises, working with .  Pain today is 0/10.  Some lack of flexibility. Vit D 34- vit D 4000 international unit(s) daily.  Gets enough calcium in her diet.  Pt reports GI upset with Fosamax in the past.  Long time ago since on Fosamax, can't recall how long she was taking it.  Smoker in the past, since quit 35 years ago.  Pt reports  Early menopause, hyst, in hospital for a week so she quit smoking.    Date last seen: 1/15/2020  Following Therapeutic Plan: Yes   Pain: Improving  Function: Improving  Interval History: PT     PAST MEDICAL, SOCIAL, SURGICAL AND FAMILY HISTORY: She  has a past medical history of Chronic low back pain and Depression.  She  has a past surgical history that includes Hysterectomy (1987) and Lasik (Bilateral).  Her family history includes Bipolar Disorder in her brother; Colon Cancer in her mother; Heart Disease in her father; Lung Cancer in her mother; Macular Degeneration in her mother.  She reports that she quit smoking about 33 years ago. She has a 10.00 pack-year smoking history. She has never used smokeless tobacco. She reports current alcohol use of about 3.0 standard drinks of alcohol per week.    ALLERGIES: She has No Known Allergies.    CURRENT MEDICATIONS: She has a current medication list which includes the following prescription(s): citalopram, magnesium, and vitamin d3.     REVIEW OF SYSTEMS: 10 point review of systems is negative except as noted above.     Exam:   Ht 1.635 m (5' 4.37\")   Wt 57.2 kg (126 lb)   BMI 21.38 kg/m              CONSTITUTIONAL: healthy, alert, no distress and cooperative  HEAD: Normocephalic. No masses, lesions, tenderness or abnormalities  SKIN: no suspicious lesions or rashes  GAIT: normal  NEUROLOGIC: Non-focal  PSYCHIATRIC: affect " normal/bright and mentation appears normal.    MUSCULOSKELETAL: LBP  Tender:  denies  Non-tender:  thoracic spinous processes, left parathoracic muscles, right parathoracic muscles, lumbar spinous processes, left para lumbar muscles, right para lumbar muscles  Range of Motion:  lumbar flexion  full, lumbar extension  full  Strength:  able to heel walk, able to toe walk  Special tests:  negative straight leg raises    Hip Exam: Hip ROM full         Assessment/Plan:   64-year-old white female with past medical history of chronic low back pain and depression presenting to follow-up on her low back pain  1.  Low back pain-with physical therapy the patient reports that her pain has completely resolved  2.  Osteopenia-discussed optimization of vitamin D and calcium and diet  Weightbearing exercises    RTC prn    X-RAY INTERPRETATION:   none    Jia Mcgee MD

## 2020-03-04 NOTE — PROGRESS NOTES
" Subjective:   Elisha Gao is a 64 year old female who is f/u for low back pain.  Working with PT, exercises, working with .  Pain today is 0/10.  Some lack of flexibility. Vit D 34- vit D 4000 international unit(s) daily.  Gets enough calcium in her diet.  Pt reports GI upset with Fosamax in the past.  Long time ago since on Fosamax, can't recall how long she was taking it.  Smoker in the past, since quit 35 years ago.  Pt reports  Early menopause, hyst, in hospital for a week so she quit smoking.    Date last seen: 1/15/2020  Following Therapeutic Plan: Yes   Pain: Improving  Function: Improving  Interval History: PT     PAST MEDICAL, SOCIAL, SURGICAL AND FAMILY HISTORY: She  has a past medical history of Chronic low back pain and Depression.  She  has a past surgical history that includes Hysterectomy (1987) and Lasik (Bilateral).  Her family history includes Bipolar Disorder in her brother; Colon Cancer in her mother; Heart Disease in her father; Lung Cancer in her mother; Macular Degeneration in her mother.  She reports that she quit smoking about 33 years ago. She has a 10.00 pack-year smoking history. She has never used smokeless tobacco. She reports current alcohol use of about 3.0 standard drinks of alcohol per week.    ALLERGIES: She has No Known Allergies.    CURRENT MEDICATIONS: She has a current medication list which includes the following prescription(s): citalopram, magnesium, and vitamin d3.     REVIEW OF SYSTEMS: 10 point review of systems is negative except as noted above.     Exam:   Ht 1.635 m (5' 4.37\")   Wt 57.2 kg (126 lb)   BMI 21.38 kg/m             CONSTITUTIONAL: healthy, alert, no distress and cooperative  HEAD: Normocephalic. No masses, lesions, tenderness or abnormalities  SKIN: no suspicious lesions or rashes  GAIT: normal  NEUROLOGIC: Non-focal  PSYCHIATRIC: affect normal/bright and mentation appears normal.    MUSCULOSKELETAL: LBP  Tender:  denies  Non-tender:  " thoracic spinous processes, left parathoracic muscles, right parathoracic muscles, lumbar spinous processes, left para lumbar muscles, right para lumbar muscles  Range of Motion:  lumbar flexion  full, lumbar extension  full  Strength:  able to heel walk, able to toe walk  Special tests:  negative straight leg raises    Hip Exam: Hip ROM full         Assessment/Plan:   64-year-old white female with past medical history of chronic low back pain and depression presenting to follow-up on her low back pain  1.  Low back pain-with physical therapy the patient reports that her pain has completely resolved  2.  Osteopenia-discussed optimization of vitamin D and calcium and diet  Weightbearing exercises    RTC prn    X-RAY INTERPRETATION:   none

## 2020-03-24 DIAGNOSIS — F32.4 MAJOR DEPRESSIVE DISORDER IN PARTIAL REMISSION, UNSPECIFIED WHETHER RECURRENT (H): ICD-10-CM

## 2020-03-25 RX ORDER — CITALOPRAM HYDROBROMIDE 40 MG/1
40 TABLET ORAL DAILY
Qty: 90 TABLET | Refills: 0 | Status: SHIPPED | OUTPATIENT
Start: 2020-03-25 | End: 2020-07-08

## 2020-03-25 NOTE — TELEPHONE ENCOUNTER
citalopram (CELEXA) 40 MG tablet      Last Written Prescription Date:  4/4/19  Last Fill Quantity: 90,   # refills: 3  Last Office Visit : 4/4/19  Future Office visit:  none  90 day pended.     Routing refill request to provider for review/approval because:  Overdue 6 month OV  PHQ-9 needed     Scheduling has been notified to contact the pt for appointment.

## 2020-07-07 ENCOUNTER — ANCILLARY PROCEDURE (OUTPATIENT)
Dept: MAMMOGRAPHY | Facility: CLINIC | Age: 65
End: 2020-07-07
Attending: INTERNAL MEDICINE
Payer: MEDICARE

## 2020-07-07 ENCOUNTER — MYC MEDICAL ADVICE (OUTPATIENT)
Dept: INTERNAL MEDICINE | Facility: CLINIC | Age: 65
End: 2020-07-07

## 2020-07-07 DIAGNOSIS — F32.4 MAJOR DEPRESSIVE DISORDER IN PARTIAL REMISSION, UNSPECIFIED WHETHER RECURRENT (H): ICD-10-CM

## 2020-07-07 DIAGNOSIS — Z12.31 VISIT FOR SCREENING MAMMOGRAM: ICD-10-CM

## 2020-07-08 RX ORDER — CITALOPRAM HYDROBROMIDE 40 MG/1
40 TABLET ORAL DAILY
Qty: 90 TABLET | Refills: 1 | Status: SHIPPED | OUTPATIENT
Start: 2020-07-08 | End: 2021-01-04

## 2020-07-08 NOTE — TELEPHONE ENCOUNTER
90 days with 1 refill.  Please advise in person or virtual follow up within the next 4-6 months in order to continue to receive medications.  Thanks,  Debi Pizarro MD  Internal Medicine

## 2020-11-08 ENCOUNTER — HEALTH MAINTENANCE LETTER (OUTPATIENT)
Age: 65
End: 2020-11-08

## 2020-11-26 ENCOUNTER — NURSE TRIAGE (OUTPATIENT)
Dept: NURSING | Facility: CLINIC | Age: 65
End: 2020-11-26

## 2020-11-26 ENCOUNTER — VIRTUAL VISIT (OUTPATIENT)
Dept: FAMILY MEDICINE | Facility: OTHER | Age: 65
End: 2020-11-26
Payer: COMMERCIAL

## 2020-11-26 ENCOUNTER — TELEPHONE (OUTPATIENT)
Dept: URGENT CARE | Facility: URGENT CARE | Age: 65
End: 2020-11-26

## 2020-11-26 DIAGNOSIS — N39.0 URINARY TRACT INFECTION: Primary | ICD-10-CM

## 2020-11-26 PROCEDURE — 99421 OL DIG E/M SVC 5-10 MIN: CPT | Performed by: FAMILY MEDICINE

## 2020-11-26 RX ORDER — NITROFURANTOIN 25; 75 MG/1; MG/1
100 CAPSULE ORAL 2 TIMES DAILY
Qty: 10 CAPSULE
Start: 2020-11-26 | End: 2020-12-01

## 2020-11-26 NOTE — PROGRESS NOTES
"Date: 2020 09:35:39  Clinician: Cruz Ruiz  Clinician NPI: [id number]  Patient: Elisha Gao  Patient : 1955  Patient Address: [address] [place] [address], [place] [address]  Patient Phone: [phone]  Visit Protocol: UTI  Patient Summary:  Elisha is a 65 year old ( : 1955 ) female who initiated a [facility] Visit for a presumed bladder infection. When asked the question \"Please sign me up to receive news, health information and promotions from [facility].\", Elisha responded \"Yes\".   Her symptoms started 1-3 days ago and consist of urinary frequency, urgency, dysuria, and feeling as if the bladder is never empty.   Symptom details   Urine color: The color of her urine is yellow.    Denied symptoms include flank pain, abdominal pain, chills, urinary incontinence, vomiting, vaginal itching, foul-smelling urine, nausea, and vaginal discharge. She does not feel feverish.   Elisha has not used any over-the-counter medications or home remedies to relieve her current symptoms.  Precipitating events  Elisha denies having a sexually transmitted disease.  Pertinent medical history  Elisha has had a bladder infection before but has not had any in the past 12 months. Her current symptoms are similar to her previous bladder infection symptoms.   Ciprofloxacin (Cipro) has been effective in treating her past bladder infections.   Elisha has not been prescribed antibiotics to prevent frequent or repeated bladder infections in the past and does not get yeast infections when she takes antibiotics. She has not experienced problems or side effects with any of the common antibiotics used to treat bladder infections.   Elisha does not have a history of kidney stones. She does not have any other urologic conditions. Her provider has not told her she has advanced kidney disease. She has not used a catheter or been a patient in a hospital or nursing home in the past 2 weeks.  She denies having immunosuppressive conditions (e.g., " chemotherapy, HIV, organ transplant, long-term use of steroids or other immunosuppressive medications, splenectomy). She does not have diabetes.   Elisha does not smoke or use smokeless tobacco.     MEDICATIONS: No current medications, ALLERGIES: NKDA  Clinician Response:  Dear Elisha,  Based on the information you have provided, you likely have an acute urinary tract infection, also called a bladder infection. Bladder infections occur when bacteria from the outside of the body enters the urinary tract. Any part of the urinary system can be infected, but the bladder is the most common.  Medication information  I am prescribing:     Nitrofurantoin monohyd/m-cryst (Macrobid) 100 mg oral capsule. Take 1 capsule by mouth every 12 hours for 5 days. Take this medication with food. There are no refills with this prescription.   The medication I prescribed for your bladder infection is an antibiotic. Continue taking the medication until it is gone even if you feel better.   Yeast infections can be a common side effect of antibiotics. The most common symptom of a yeast infection is itchiness in and around the vagina. Other signs and symptoms include burning, redness, or a thick, white vaginal discharge that looks like cottage cheese and does not have a bad smell.  Self care  Urination helps to flush bacteria from the urinary tract. For this reason, drinking water and urinating often helps relieve some urinary symptoms and can decrease your risk of getting bladder infections in the future.  Other steps you can take to prevent future bladder infections include:     Wipe front to back after using the bathroom    Urinate after sexual intercourse    Avoid using deodorant sprays, douches, or powders in the vaginal area     When to seek care  Please make an appointment to be seen in a clinic or urgent care if any of the following occur:     You develop new symptoms or your symptoms become worse    You have medication side effects  that make it difficult to take them as prescribed    Your symptoms do not improve within 1-2 days of starting treatment    You have symptoms of a bladder infection that return shortly after completing treatment     It is possible to have an allergic reaction to an antibiotic even if you have not had one in the past. If you notice a new rash, significant swelling, or difficulty breathing, stop taking this medication immediately and go to a clinic or urgent care.   Diagnosis: Acute uncomplicated bladder infection  Diagnosis ICD: N39.0  Prescription: nitrofurantoin monohyd/m-cryst (Macrobid) 100 mg oral capsule 10 capsule, 5 days supply. Take 1 capsule by mouth every 12 hours for 5 days. Refills: 0, Refill as needed: no, Allow substitutions: yes  Pharmacy: [id number] - [phone] - [address] [place] [address]

## 2020-11-26 NOTE — TELEPHONE ENCOUNTER
Patient spoke with Dr. MARC Bowling via [employer].[employer] was DX with UTI.  RX was sent to a pharmacy that was closed.  Write called in RX to [facility] on [address] in [place] [phone].     Nitrofurantoin monohyd/m-cryst (Macrobid) 100 mg oral capsule 10 capsule, 5 days supply. Take 1 capsule by mouth every 12 hours for 5 days. Refills: 0.    Haydee Tran RN MAN   Triage Nurse Advisor [facility]      Additional Information    Caller requesting a refill, no triage required, and triager able to refill per unit policy    Protocols used: MEDICATION QUESTION CALL-A-    COVID 19 Nurse Triage Plan/Patient Instructions    Please be aware that novel coronavirus (COVID-19) may be circulating in the community. If you develop symptoms such as fever, cough, or SOB or if you have concerns about the presence of another infection including coronavirus (COVID-19), please contact your health care provider or visit [email or web address].     Disposition/Instructions    Home care recommended. Follow home care protocol based instructions.    Thank you for taking steps to prevent the spread of this virus.  o Limit your contact with others.  o Wear a simple mask to cover your cough.  o Wash your hands well and often.    Resources    [facility]: About COVID-19: [email or web address]    CDC: What to Do If You're Sick: www.cdc.gov/coronavirus/2019-ncov/about/steps-when-sick.html    CDC: Ending Home Isolation: www.cdc.gov/coronavirus/2019-ncov/hcp/disposition-in-home-patients.html     CDC: Caring for Someone: www.cdc.gov/coronavirus/2019-ncov/if-you-are-sick/care-for-someone.html     [facility]: Interim Guidance for Hospital Discharge to Home: www.health.[facility].[place].us/diseases/coronavirus/hcp/hospdischarge.pdf    [facility] clinical trials (COVID-19 research studies): [email or web address]/[email or web address]     Below are the COVID-19 hotlines at the [facility] of Health ([facility]). Interpreters are available.    o For health questions: Call 096-332-1424 or 1-603.681.7284 (7 a.m. to 7 p.m.)  o For questions about schools and childcare: Call 963-188-6343 or 1-509.552.2235 (7 a.m. to 7 p.m.)

## 2020-11-26 NOTE — TELEPHONE ENCOUNTER
We were transferred a call from Central Scheduling about patient's pharmacy being closed from her virtual visit from Dr. Cruz Ruiz. I talked to patient and she said she's willing to use whatever pharmacy is open today. I called over to [facility] in [place] on [address] to call in prescription that was prescribed during virtual visit (Macrobid).     I called and notified patient of this as well. Pt says that [facility] called over prescription to a different pharmacy in [place]. Pt plans to go to pharmacy in [place] where [facility] called in prescription.     FELISHA Mtz  12:14 PM 11/26/2020

## 2021-01-04 ENCOUNTER — OFFICE VISIT (OUTPATIENT)
Dept: INTERNAL MEDICINE | Facility: CLINIC | Age: 66
End: 2021-01-04
Payer: MEDICARE

## 2021-01-04 VITALS
HEIGHT: 66 IN | HEART RATE: 66 BPM | BODY MASS INDEX: 20.73 KG/M2 | DIASTOLIC BLOOD PRESSURE: 79 MMHG | OXYGEN SATURATION: 98 % | WEIGHT: 129 LBS | SYSTOLIC BLOOD PRESSURE: 125 MMHG

## 2021-01-04 DIAGNOSIS — R73.01 ELEVATED FASTING BLOOD SUGAR: ICD-10-CM

## 2021-01-04 DIAGNOSIS — E78.2 MIXED HYPERLIPIDEMIA: ICD-10-CM

## 2021-01-04 DIAGNOSIS — Z23 NEED FOR PNEUMOCOCCAL VACCINATION: ICD-10-CM

## 2021-01-04 DIAGNOSIS — M85.80 OSTEOPENIA, UNSPECIFIED LOCATION: ICD-10-CM

## 2021-01-04 DIAGNOSIS — F32.4 MAJOR DEPRESSIVE DISORDER IN PARTIAL REMISSION, UNSPECIFIED WHETHER RECURRENT (H): ICD-10-CM

## 2021-01-04 DIAGNOSIS — Z13.220 SCREENING FOR HYPERLIPIDEMIA: ICD-10-CM

## 2021-01-04 DIAGNOSIS — R79.9 ABNORMAL FINDING OF BLOOD CHEMISTRY, UNSPECIFIED: ICD-10-CM

## 2021-01-04 DIAGNOSIS — Z00.00 ENCOUNTER FOR MEDICARE ANNUAL WELLNESS EXAM: Primary | ICD-10-CM

## 2021-01-04 DIAGNOSIS — Z12.31 ENCOUNTER FOR SCREENING MAMMOGRAM FOR BREAST CANCER: ICD-10-CM

## 2021-01-04 DIAGNOSIS — E55.9 VITAMIN D DEFICIENCY: ICD-10-CM

## 2021-01-04 LAB
CHOLEST SERPL-MCNC: 238 MG/DL
HBA1C MFR BLD: 5.5 % (ref 0–5.6)
HDLC SERPL-MCNC: 69 MG/DL
LDLC SERPL CALC-MCNC: 149 MG/DL
NONHDLC SERPL-MCNC: 169 MG/DL
TRIGL SERPL-MCNC: 101 MG/DL
TSH SERPL DL<=0.005 MIU/L-ACNC: 1.78 MU/L (ref 0.4–4)

## 2021-01-04 PROCEDURE — 36415 COLL VENOUS BLD VENIPUNCTURE: CPT | Performed by: PATHOLOGY

## 2021-01-04 PROCEDURE — 80061 LIPID PANEL: CPT | Performed by: PATHOLOGY

## 2021-01-04 PROCEDURE — 90732 PPSV23 VACC 2 YRS+ SUBQ/IM: CPT | Performed by: INTERNAL MEDICINE

## 2021-01-04 PROCEDURE — 84443 ASSAY THYROID STIM HORMONE: CPT | Performed by: PATHOLOGY

## 2021-01-04 PROCEDURE — 83036 HEMOGLOBIN GLYCOSYLATED A1C: CPT | Performed by: PATHOLOGY

## 2021-01-04 PROCEDURE — G0009 ADMIN PNEUMOCOCCAL VACCINE: HCPCS | Performed by: INTERNAL MEDICINE

## 2021-01-04 PROCEDURE — G0439 PPPS, SUBSEQ VISIT: HCPCS | Performed by: INTERNAL MEDICINE

## 2021-01-04 RX ORDER — CITALOPRAM HYDROBROMIDE 40 MG/1
40 TABLET ORAL DAILY
Qty: 90 TABLET | Refills: 1 | Status: SHIPPED | OUTPATIENT
Start: 2021-01-04 | End: 2021-06-03

## 2021-01-04 RX ORDER — BUPROPION HYDROCHLORIDE 150 MG/1
150 TABLET ORAL EVERY MORNING
Qty: 90 TABLET | Refills: 1 | Status: SHIPPED | OUTPATIENT
Start: 2021-01-04 | End: 2021-02-24

## 2021-01-04 ASSESSMENT — PATIENT HEALTH QUESTIONNAIRE - PHQ9
SUM OF ALL RESPONSES TO PHQ QUESTIONS 1-9: 4
5. POOR APPETITE OR OVEREATING: SEVERAL DAYS

## 2021-01-04 ASSESSMENT — PAIN SCALES - GENERAL: PAINLEVEL: NO PAIN (0)

## 2021-01-04 ASSESSMENT — MIFFLIN-ST. JEOR: SCORE: 1141.89

## 2021-01-04 ASSESSMENT — ANXIETY QUESTIONNAIRES
IF YOU CHECKED OFF ANY PROBLEMS ON THIS QUESTIONNAIRE, HOW DIFFICULT HAVE THESE PROBLEMS MADE IT FOR YOU TO DO YOUR WORK, TAKE CARE OF THINGS AT HOME, OR GET ALONG WITH OTHER PEOPLE: NOT DIFFICULT AT ALL
5. BEING SO RESTLESS THAT IT IS HARD TO SIT STILL: NOT AT ALL
6. BECOMING EASILY ANNOYED OR IRRITABLE: SEVERAL DAYS
7. FEELING AFRAID AS IF SOMETHING AWFUL MIGHT HAPPEN: NOT AT ALL
2. NOT BEING ABLE TO STOP OR CONTROL WORRYING: SEVERAL DAYS
1. FEELING NERVOUS, ANXIOUS, OR ON EDGE: SEVERAL DAYS
GAD7 TOTAL SCORE: 5
3. WORRYING TOO MUCH ABOUT DIFFERENT THINGS: SEVERAL DAYS

## 2021-01-04 NOTE — PROGRESS NOTES
Medicare Annual Wellness Visit    This 65 year old year old female presents for an subsequent Medicare Wellness Exam.         HPI     She has concerns about depression. More down, less energy, less patient. Lives with wife.  Worries about mom.  Impacting relations, zoom meetings.  Goes walking.  Takes 40 mg of celexa.  Has been on lexapro in past.  Not worrying.  No trouble sleeping.  No big weight changes.  Otherwise, no major changes in physical health. No illnesses or hospitalizations.      Routine Health Maintenence:  Depression Screening:   PHQ-2 Score:     PHQ-2 ( 1999 Pfizer) 1/4/2021 1/15/2020   Q1: Little interest or pleasure in doing things 1 0   Q2: Feeling down, depressed or hopeless 1 0   PHQ-2 Score 2 0   Q1: Little interest or pleasure in doing things - -   Q2: Feeling down, depressed or hopeless - -   PHQ-2 Score - -       Immunizations (zoster, pneumovax, flu, Tdap, Hep A/B):   Lung Ca Screening (>30 pk age 55-79 or >20 py age 50-79 + RF): does not qualify  Colonoscopy (50-75 yrs): 2012, 4/19, divertic, 10 year follow up  Dexa (>65W or 70M yrs): 4/19   The most negative and valid T-score of -1.9 at the level of the right femoral neck, corresponds with low bone density.    12/11 (on meds for 6 months)  RESULT: LUMBAR SPINE  BMD (gm/cm2): 0.959  T score: -0.88  RESULT: HIP  BMD (gm/cm2): 0.724  T score: -2.25  FRAX RISK CALCULATION    10 YEAR TOTAL FRACTURE RISK 7 %   10 YEAR HIP FRACTURE RISK 1 %    Mammogram (40-75 yrs): 7/19  Pap (21-65 yrs): No results found for: PAP  Tob/EtOH: screen neg  Lifestyle factors: reviewed  Advanced Directive: deferred        Past Medical History:   Diagnosis Date     Chronic low back pain     for a couple years, worse at night, not evaluated     Depression        Past Surgical History:   Procedure Laterality Date     HYSTERECTOMY  1987    endometriosis, ovarian cysts     LASIK Bilateral        Family History   Problem Relation Age of Onset     Colon Cancer Mother          75     Lung Cancer Mother         90     Macular Degeneration Mother      Heart Disease Father         62     Bipolar Disorder Brother      Glaucoma No family hx of        Social History     Tobacco Use     Smoking status: Former Smoker     Packs/day: 0.50     Years: 20.00     Pack years: 10.00     Quit date:      Years since quittin.0     Smokeless tobacco: Never Used   Substance Use Topics     Alcohol use: Yes     Alcohol/week: 3.0 standard drinks     Types: 3 Glasses of wine per week     Drug use: None            Medical Care     Have you been to an ER or a hospital in the last year? No  What other specialists or organizations are involved in your medical care?  n/a  Current providers sharing in care for this patient include:  Patient Care Team       Relationship Specialty Notifications Start End    Debi Pizarro MD PCP - General Internal Medicine  19     Phone: [phone] Fax: [phone]          [address] [address]    Debi Pizarro MD Assigned PCP   20     Phone: [phone] Fax: [phone]         [address] [address]                 Social History     Marital Status:  Who lives in your household? herself and wife  Does your home have any of the following safety concerns? Loose rugs in the hallway, no grab bars in the bathroom, no handrails on the stairs or have poorly lit areas?  No  Do you feel threatened or controlled by a partner, ex-partner or anyone in your life? No  Has anyone hurt you physically, for example by pushing, hitting, slapping or kicking you   or forcing you to have sex? No  Do you need help with the phone, transportation, shopping, preparing meals, housework, laundry, medications or managing money? No   Have you noticed any hearing difficulties? No      Risk Behaviors and Healthy Habits     How many servings of fruits and vegetables do you eat a day? 2  How often do you exercise and what do you do? 4 minutes  2 a week  Do you frequently ride without a seatbelt? No  Do you use tobacco?  No  Do you use any other drugs? No       Do you use alcohol?Yes  Number of drinks per day 2  Number of drinking days a week 3-4    PHQ-2 Score:    PHQ-2 Score:     PHQ-2 ( 1999 Pfizer) 1/15/2020 4/4/2019   Q1: Little interest or pleasure in doing things 0 0   Q2: Feeling down, depressed or hopeless 0 0   PHQ-2 Score 0 0   Q1: Little interest or pleasure in doing things - Not at all   Q2: Feeling down, depressed or hopeless - Not at all   PHQ-2 Score - 0         Sexual Health     Are you sexually active?  Yes    If yes, with men, women, or both? Female  If yes, do you more than one current partner?No  If yes, are you using condoms?  n/a  Have you had any sexually transmitted infections? No   Any sexual concerns? No     FOR WOMEN ONLY  What year did you stop having periods? 1987  Any vaginal bleeding in the last year? No  Have you ever had an abnormal Pap smear? No    FUNCTIONAL ABILITY/SAFETY SCREENING   **RS to complete Fall Risk, PHQ2 in Assessments prior**    Fall Risk Assessment Today:   Fall Risk Screening:         Hearing evaluation if done: No concerns    Visual acuity : Sees ophtho    SCREENING FOR PREVENTION and EARLY DETECTION     Advanced Directives: Discussed and patient desires to to have further information and discuss with family.      **RS to STOP HERE**    Reviewed Immunization Record Today      There is no immunization history on file for this patient.    Reviewed Health Maintenance Completed and Due    Health Maintenance   Topic Date Due     ADVANCE CARE PLANNING  1955     DEPRESSION ACTION PLAN  1955     PHQ-9  1955     HEPATITIS C SCREENING  04/22/1972     DTAP/TDAP/TD IMMUNIZATION (1 - Tdap) 04/22/1979     ZOSTER IMMUNIZATION (1 of 2) 04/22/2004     FALL RISK ASSESSMENT  04/22/2019     Pneumococcal Vaccine: 65+ Years (1 of 1 - PPSV23) 04/22/2019     MEDICARE ANNUAL WELLNESS VISIT  01/04/2022     MAMMO  "SCREENING  07/07/2022     LIPID  04/04/2024     COLORECTAL CANCER SCREENING  04/10/2029     DEXA  04/04/2034     INFLUENZA VACCINE  Completed     Pneumococcal Vaccine: Pediatrics (0 to 5 Years) and At-Risk Patients (6 to 64 Years)  Aged Out     IPV IMMUNIZATION  Aged Out     MENINGITIS IMMUNIZATION  Aged Out     HEPATITIS B IMMUNIZATION  Aged Out       Health Maintenance Due   Topic Date Due     ADVANCE CARE PLANNING  1955     DEPRESSION ACTION PLAN  1955     PHQ-9  1955     HEPATITIS C SCREENING  04/22/1972     DTAP/TDAP/TD IMMUNIZATION (1 - Tdap) 04/22/1979     ZOSTER IMMUNIZATION (1 of 2) 04/22/2004     FALL RISK ASSESSMENT  04/22/2019     Pneumococcal Vaccine: 65+ Years (1 of 1 - PPSV23) 04/22/2019         CV Risk based on Pooled Cohort Risk:  The 10-year ASCVD risk score (Rafael SZYMANSKI Jr., et al., 2013) is: 6.1%    Values used to calculate the score:      Age: 65 years      Sex: Female      Is Non- : No      Diabetic: No      Tobacco smoker: No      Systolic Blood Pressure: 125 mmHg      Is BP treated: No      HDL Cholesterol: 60 mg/dL      Total Cholesterol: 225 mg/dL    --------------------------------------------------------------------------------------------------------------------------  --------------------------------------------------------------------------------------------------------------------------         Review of Systems     10 POINT review of systems performed and is negative unless specified above in HPI.    EVALUATION OF COGNITIVE FUNCTION     Mood/affect:Normal  Appearance:Normal  Family member/caregiver input: n/a    Mini Cog Scoring   3 points   Clock Draw Test result:  Normal           Physical Exam     Vitals: /73 (BP Location: Right arm, Patient Position: Sitting, Cuff Size: Adult Large)   Pulse 65   Ht 1.829 m (6' 0.01\")   Wt 107.5 kg (237 lb)   LMP  (LMP Unknown)   SpO2 96%   Breastfeeding? No   BMI 32.14 kg/m    BMI= Body mass " index is 32.14 kg/m .  Constitutional: Alert, oriented, pleasant, no acute distress  Head: Normocephalic, atraumatic  Eyes: Extra-ocular movements intact, pupils equally round and reactive bilaterally, no scleral icterus  ENT: Wearing mask  Neck: Supple, no lymphadenopathy, no thyromegaly, no JVD  Cardiovascular: Regular rate and rhythm, no murmurs, rubs or gallops, peripheral pulses full/symmetric  Respiratory: Good air movement bilaterally, lungs clear, no wheezes/rales/rhonchi  GI: Abdomen soft, bowel sounds present, nondistended, nontender, no organomegaly or masses, no rebound/guarding  Musculoskeletal: No edema, normal muscle tone, normal gait  Neurologic: Alert and oriented, cranial nerves 2-12 intact, strength 5/5 throughout  Skin: No rashes/lesions  Psychiatric: normal mentation, affect and mood          Assessment and Plan     Medicare Wellness Exam    Elisha was seen today for physical.    Diagnoses and all orders for this visit:    Encounter for Medicare annual wellness exam    Major depressive disorder in partial remission, unspecified whether recurrent (H)  Will try adding wellbutrin for mood.  -     buPROPion (WELLBUTRIN XL) 150 MG 24 hr tablet; Take 1 tablet (150 mg) by mouth every morning  -     citalopram (CELEXA) 40 MG tablet; Take 1 tablet (40 mg) by mouth daily  -     TSH with free T4 reflex; Future    Encounter for screening mammogram for breast cancer  Completed 7/20.    Need for pneumococcal vaccination  -     Pneumococcal vaccine 23 valent PPSV23  (Pneumovax) [43480]    Vitamin D deficiency  Osteopenia, unspecified location  Rec resuming vit D.    Screening for hyperlipidemia, Mixed hyperlipidemia  -     Lipid panel reflex to direct LDL Fasting; Future    Elevated fasting blood sugar  -     Hemoglobin A1c; Future        Options for treatment and follow-up care were reviewed with Elisha Gao and/or guardian engaged in the decision making process and verbalized understanding of the options  discussed and agreed with the final plan.    Debi Pizarro MD

## 2021-01-04 NOTE — PATIENT INSTRUCTIONS
Patient Education   Personalized Prevention Plan  You are due for the preventive services outlined below.  Your care team is available to assist you in scheduling these services.  If you have already completed any of these items, please share that information with your care team to update in your medical record.  Health Maintenance Due   Topic Date Due     Discuss Advance Care Planning  1955     Depression Action Plan  1955     Depression Assessment  1955     Hepatitis C Screening  04/22/1972     Diptheria Tetanus Pertussis (DTAP/TDAP/TD) Vaccine (1 - Tdap) 04/22/1979     Zoster (Shingles) Vaccine (1 of 2) 04/22/2004     FALL RISK ASSESSMENT  04/22/2019     Pneumococcal Vaccine (1 of 1 - PPSV23) 04/22/2019     Preventive Health Recommendations    See your health care provider every year to    Review health changes.     Discuss preventive care.      Review your medicines if your doctor has prescribed any.    You no longer need a yearly Pap test unless you've had an abnormal Pap test in the past 10 years. If you have vaginal symptoms, such as bleeding or discharge, be sure to talk with your provider about a Pap test.    Every 1 to 2 years, have a mammogram.  If you are over 69, talk with your health care provider about whether or not you want to continue having screening mammograms.    Every 10 years, have a colonoscopy. Or, have a yearly FIT test (stool test). These exams will check for colon cancer.     Have a cholesterol test every 5 years, or more often if your doctor advises it.     Have a diabetes test (fasting glucose) every three years. If you are at risk for diabetes, you should have this test more often.     At age 65, have a bone density scan (DEXA) to check for osteoporosis (brittle bone disease).    Shots:    Get a flu shot each year.    Get a tetanus shot every 10 years.    Talk to your doctor about your pneumonia vaccines. There are now two you should receive - Pneumovax (PPSV 23)  and Prevnar (PCV 13).    Talk to your pharmacist about the shingles vaccine.    Talk to your doctor about the hepatitis B vaccine.    Nutrition:     Eat at least 5 servings of fruits and vegetables each day.    Eat whole-grain bread, whole-wheat pasta and brown rice instead of white grains and rice.    Get adequate Calcium and Vitamin D.     Lifestyle    Exercise at least 150 minutes a week (30 minutes a day, 5 days a week). This will help you control your weight and prevent disease.    Limit alcohol to one drink per day.    No smoking.     Wear sunscreen to prevent skin cancer.     See your dentist twice a year for an exam and cleaning.    See your eye doctor every 1 to 2 years to screen for conditions such as glaucoma, macular degeneration and cataracts.    Personalized Prevention Plan  You are due for the preventive services outlined below.  Your care team is available to assist you in scheduling these services.  If you have already completed any of these items, please share that information with your care team to update in your medical record.  Health Maintenance   Topic Date Due     ADVANCE CARE PLANNING  1955     DEPRESSION ACTION PLAN  1955     PHQ-9  1955     HEPATITIS C SCREENING  04/22/1972     DTAP/TDAP/TD IMMUNIZATION (1 - Tdap) 04/22/1979     ZOSTER IMMUNIZATION (1 of 2) 04/22/2004     FALL RISK ASSESSMENT  04/22/2019     Pneumococcal Vaccine: 65+ Years (1 of 1 - PPSV23) 04/22/2019     MEDICARE ANNUAL WELLNESS VISIT  01/04/2022     MAMMO SCREENING  07/07/2022     LIPID  04/04/2024     COLORECTAL CANCER SCREENING  04/10/2029     DEXA  04/04/2034     INFLUENZA VACCINE  Completed     Pneumococcal Vaccine: Pediatrics (0 to 5 Years) and At-Risk Patients (6 to 64 Years)  Aged Out     IPV IMMUNIZATION  Aged Out     MENINGITIS IMMUNIZATION  Aged Out     HEPATITIS B IMMUNIZATION  Aged Out

## 2021-01-04 NOTE — NURSING NOTE
Chief Complaint   Patient presents with     Physical     wellness check        Yadiar Hunter MA, at 8:09 AM on 1/4/2021.

## 2021-01-05 ASSESSMENT — ANXIETY QUESTIONNAIRES: GAD7 TOTAL SCORE: 5

## 2021-01-20 ENCOUNTER — MYC MEDICAL ADVICE (OUTPATIENT)
Dept: INTERNAL MEDICINE | Facility: CLINIC | Age: 66
End: 2021-01-20

## 2021-02-24 ENCOUNTER — MYC MEDICAL ADVICE (OUTPATIENT)
Dept: INTERNAL MEDICINE | Facility: CLINIC | Age: 66
End: 2021-02-24

## 2021-02-24 DIAGNOSIS — F32.4 MAJOR DEPRESSIVE DISORDER IN PARTIAL REMISSION, UNSPECIFIED WHETHER RECURRENT (H): ICD-10-CM

## 2021-02-24 RX ORDER — BUPROPION HYDROCHLORIDE 300 MG/1
300 TABLET ORAL EVERY MORNING
Qty: 90 TABLET | Refills: 1 | Status: SHIPPED | OUTPATIENT
Start: 2021-02-24 | End: 2021-02-26

## 2021-02-25 ENCOUNTER — MYC MEDICAL ADVICE (OUTPATIENT)
Dept: INTERNAL MEDICINE | Facility: CLINIC | Age: 66
End: 2021-02-25

## 2021-02-25 DIAGNOSIS — F32.4 MAJOR DEPRESSIVE DISORDER IN PARTIAL REMISSION, UNSPECIFIED WHETHER RECURRENT (H): ICD-10-CM

## 2021-02-26 RX ORDER — BUPROPION HYDROCHLORIDE 150 MG/1
150 TABLET ORAL EVERY MORNING
Qty: 90 TABLET | Refills: 0 | Status: SHIPPED | OUTPATIENT
Start: 2021-02-26 | End: 2022-03-02

## 2021-05-25 ENCOUNTER — APPOINTMENT (OUTPATIENT)
Dept: GENERAL RADIOLOGY | Facility: CLINIC | Age: 66
End: 2021-05-25
Attending: EMERGENCY MEDICINE
Payer: MEDICARE

## 2021-05-25 ENCOUNTER — HOSPITAL ENCOUNTER (EMERGENCY)
Facility: CLINIC | Age: 66
Discharge: HOME | End: 2021-05-25
Attending: EMERGENCY MEDICINE | Admitting: EMERGENCY MEDICINE
Payer: MEDICARE

## 2021-05-25 VITALS
RESPIRATION RATE: 16 BRPM | WEIGHT: 127 LBS | DIASTOLIC BLOOD PRESSURE: 89 MMHG | HEART RATE: 76 BPM | HEIGHT: 65 IN | BODY MASS INDEX: 21.16 KG/M2 | SYSTOLIC BLOOD PRESSURE: 162 MMHG | TEMPERATURE: 96.3 F | OXYGEN SATURATION: 100 %

## 2021-05-25 DIAGNOSIS — S81.812A LACERATION OF LEG, LEFT, INITIAL ENCOUNTER: ICD-10-CM

## 2021-05-25 PROCEDURE — 99284 EMERGENCY DEPT VISIT MOD MDM: CPT | Mod: 25 | Performed by: EMERGENCY MEDICINE

## 2021-05-25 PROCEDURE — 99283 EMERGENCY DEPT VISIT LOW MDM: CPT | Performed by: EMERGENCY MEDICINE

## 2021-05-25 PROCEDURE — 73590 X-RAY EXAM OF LOWER LEG: CPT | Mod: LT

## 2021-05-25 PROCEDURE — 73590 X-RAY EXAM OF LOWER LEG: CPT | Mod: 26 | Performed by: RADIOLOGY

## 2021-05-25 PROCEDURE — 250N000013 HC RX MED GY IP 250 OP 250 PS 637: Performed by: EMERGENCY MEDICINE

## 2021-05-25 PROCEDURE — 73610 X-RAY EXAM OF ANKLE: CPT | Mod: 26 | Performed by: RADIOLOGY

## 2021-05-25 PROCEDURE — 73610 X-RAY EXAM OF ANKLE: CPT | Mod: LT

## 2021-05-25 PROCEDURE — 12001 RPR S/N/AX/GEN/TRNK 2.5CM/<: CPT | Performed by: EMERGENCY MEDICINE

## 2021-05-25 RX ORDER — IBUPROFEN 600 MG/1
600 TABLET, FILM COATED ORAL ONCE
Status: COMPLETED | OUTPATIENT
Start: 2021-05-25 | End: 2021-05-25

## 2021-05-25 RX ORDER — LIDOCAINE HYDROCHLORIDE 10 MG/ML
INJECTION, SOLUTION EPIDURAL; INFILTRATION; INTRACAUDAL; PERINEURAL
Status: DISCONTINUED
Start: 2021-05-25 | End: 2021-05-25 | Stop reason: HOSPADM

## 2021-05-25 RX ORDER — LIDOCAINE HYDROCHLORIDE 10 MG/ML
10 INJECTION, SOLUTION INFILTRATION; PERINEURAL ONCE
Status: DISCONTINUED | OUTPATIENT
Start: 2021-05-25 | End: 2021-05-25 | Stop reason: HOSPADM

## 2021-05-25 RX ORDER — CEPHALEXIN 500 MG/1
500 CAPSULE ORAL 4 TIMES DAILY
Qty: 40 CAPSULE | Refills: 0 | Status: SHIPPED | OUTPATIENT
Start: 2021-05-25 | End: 2021-06-04

## 2021-05-25 RX ADMIN — IBUPROFEN 600 MG: 600 TABLET, FILM COATED ORAL at 14:05

## 2021-05-25 ASSESSMENT — MIFFLIN-ST. JEOR: SCORE: 1111.95

## 2021-05-25 NOTE — ED TRIAGE NOTES
"Pt comes from urgent care after horse kicked pt's left shin/ankle. Pt has \"3 inch open wound\" currently wrapped with kerlex. Sent over for xray before being stitched.  "

## 2021-05-25 NOTE — ED PROVIDER NOTES
[place] EMERGENCY DEPARTMENT ([facility])  21    History     Chief Complaint   Patient presents with     Trauma     The history is provided by the patient and medical records.     Elisha Gao is a 66 year old female with a medical history significant for osteopenia, who presents to the emergency department from urgent care for evaluation following trauma to her left shin/ankle.  Patient states a horse kicked her left shin/ankle at 11 AM today and he now has a 3 inch open wound which is currently wrapped with Kerlex. Her shin was not imaged prior to being sent to the Emergency Department for evaluation. Patient's last Tdap was in . Patient did receive the Moderna COVID-19 vaccination, last dose was 3/13/21.    Patient denies fever or chills.  She has no nausea or vomiting.  She notes no numbness or weakness.  She notes no other injuries.    Past Medical History:   Diagnosis Date     Chronic low back pain     for a couple years, worse at night, not evaluated     Depression        Past Surgical History:   Procedure Laterality Date     HYSTERECTOMY      endometriosis, ovarian cysts     LASIK Bilateral        Family History   Problem Relation Age of Onset     Colon Cancer Mother         75     Lung Cancer Mother         90     Macular Degeneration Mother      Dementia Mother      Heart Disease Father         62     Bipolar Disorder Brother      Glaucoma No family hx of        Social History     Tobacco Use     Smoking status: Former Smoker     Packs/day: 0.50     Years: 20.00     Pack years: 10.00     Quit date:      Years since quittin.4     Smokeless tobacco: Never Used   Substance Use Topics     Alcohol use: Yes     Alcohol/week: 3.0 standard drinks     Types: 3 Glasses of wine per week       No current facility-administered medications for this encounter.      Current Outpatient Medications   Medication     cephALEXin (KEFLEX) 500 MG capsule     buPROPion (WELLBUTRIN XL) 150 MG 24  "hr tablet     citalopram (CELEXA) 40 MG tablet     magnesium 250 MG tablet     vitamin D3 (CHOLECALCIFEROL) 1000 units (25 mcg) tablet      No Known Allergies       Review of Systems   Constitutional: Negative for chills and fever.   Respiratory: Negative for shortness of breath.    Cardiovascular: Negative for chest pain.   Gastrointestinal: Negative for nausea and vomiting.   Musculoskeletal: Positive for myalgias.   Skin: Positive for wound.   Neurological: Negative for weakness and numbness.         Physical Exam   BP: (!) 162/89  Pulse: 76  Temp: 96.3  F (35.7  C)  Resp: 16  Height: 165.1 cm (5' 5\")  Weight: 57.6 kg (127 lb)  SpO2: 100 %  Physical Exam  Constitutional:       General: She is not in acute distress.     Appearance: She is well-developed.   HENT:      Head: Normocephalic and atraumatic.   Eyes:      Conjunctiva/sclera: Conjunctivae normal.      Pupils: Pupils are equal, round, and reactive to light.   Neck:      Musculoskeletal: Normal range of motion and neck supple.      Thyroid: No thyromegaly.      Trachea: No tracheal deviation.   Cardiovascular:      Rate and Rhythm: Normal rate and regular rhythm.      Heart sounds: Normal heart sounds. No murmur.   Pulmonary:      Effort: Pulmonary effort is normal. No respiratory distress.      Breath sounds: Normal breath sounds. No wheezing.   Chest:      Chest wall: No tenderness.   Abdominal:      General: There is no distension.      Palpations: Abdomen is soft.      Tenderness: There is no abdominal tenderness.   Musculoskeletal:         General: No tenderness.        Legs:    Skin:     General: Skin is warm.      Findings: No rash.   Neurological:      Mental Status: She is alert and oriented to person, place, and time.      Sensory: No sensory deficit.   Psychiatric:         Behavior: Behavior normal.         ED Course      Procedures        Patient's wound was infiltrated with 10 cc of 1% lidocaine.  It was cleansed with Hibiclens.  The wound was " explored and showed no muscle or tendon involvement.  The wound was then sutured with eleven 4-0 Ethilon sutures.  The wound was then dressed.       Results for orders placed or performed during the hospital encounter of 05/25/21   XR Tibia & Fibula Left 2 Views     Status: None    Narrative    Exam: 4 views of the left tibia and fibula dated 5/25/2021.    COMPARISON: Same day.    CLINICAL HISTORY: Trauma.    FINDINGS: AP and lateral views of the left tibia and fibula were  obtained. The bones are well aligned. No displaced fractures. No  significant joint effusion. Soft tissue defect again seen along the  anteromedial aspect of the left lower shin.      Impression    IMPRESSION:  1. No displaced fractures in the left tibia or fibula.  2. Soft tissue defect along the anterior medial aspect of the distal  left perez.    ERIN GROSS MD   XR Ankle Left G/E 3 Views     Status: None    Narrative    Exam: 3 views of the left ankle dated 525/2021.    COMPARISON: None.    CLINICAL HISTORY: Trauma after horse kicked shin.    FINDINGS: AP, oblique, and lateral views of the left ankle were  obtained. Soft tissue irregularity along the anterior/medial aspect of  the left lower extremity, located approximately 5 to 6 cm superior to  the left ankle joint, correlate with clinical exam. The bones are well  aligned. No displaced fractures.      Impression    IMPRESSION:  1. Presumed soft tissue defect along the anteromedial aspect of the  left lower extremity, approximately 5 to 6 cm superior to the left  ankle joint.  2. No acute bone abnormality in the left ankle.    ERIN GROSS MD     Medications   ibuprofen (ADVIL/MOTRIN) tablet 600 mg (600 mg Oral Given 5/25/21 1405)        Assessments & Plan (with Medical Decision Making)   66-year-old female who presents for laceration of her left lower extremity after being kicked by a horse.  She does have a history of osteopenia, and an x-ray was ordered.  She has no acute  neurologic deficits.  Her tetanus is up-to-date.  She has no other complaints.    X-rays show no bony abnormality.  Her wound was sutured.  A dressing was applied.    Patient will be placed on a prophylactic antibiotic as her wound has a higher risk for infection.  Her pain was tolerable with Tylenol and Motrin.  She will follow-up with primary care for suture removal in 8 to 10 days, and wound care instructions were provided.    I have reviewed the nursing notes. I have reviewed the findings, diagnosis, plan and need for follow up with the patient.    Discharge Medication List as of 5/25/2021  3:28 PM      START taking these medications    Details   cephALEXin (KEFLEX) 500 MG capsule Take 1 capsule (500 mg) by mouth 4 times daily for 10 days, Disp-40 capsule, R-0, E-Prescribe             Final diagnoses:   Laceration of leg, left, initial encounter       --    [facility] EMERGENCY DEPARTMENT  5/25/2021     Amari Aguilar MD  05/26/21 5359

## 2021-05-25 NOTE — DISCHARGE INSTRUCTIONS
Keep wound clean and dry  Tylenol or Motrin as needed for pain  Watch for signs of infection  Clinic appointment for suture removal 8 to 10 days

## 2021-05-26 ASSESSMENT — ENCOUNTER SYMPTOMS
WEAKNESS: 0
SHORTNESS OF BREATH: 0
CHILLS: 0
WOUND: 1
FEVER: 0
NAUSEA: 0
MYALGIAS: 1
VOMITING: 0
NUMBNESS: 0

## 2021-05-31 DIAGNOSIS — F32.4 MAJOR DEPRESSIVE DISORDER IN PARTIAL REMISSION, UNSPECIFIED WHETHER RECURRENT (H): ICD-10-CM

## 2021-06-03 RX ORDER — CITALOPRAM HYDROBROMIDE 40 MG/1
40 TABLET ORAL DAILY
Qty: 90 TABLET | Refills: 1 | Status: SHIPPED | OUTPATIENT
Start: 2021-06-03 | End: 2022-01-14

## 2021-06-03 NOTE — TELEPHONE ENCOUNTER
Citalopram Hydrobromide Oral Tablet 40 MG      Last Written Prescription Date:  1/4/21  Last Fill Quantity: 90,   # refills: 1  Last Office Visit : 1/4/21 recommended 1 year follow up  Future Office visit:  None scheduled    Routing refill request to provider for review/approval because:  Protocol requires every 6 month follow up  Last note indicates return in 1 year.  Can med be filled for additional 6 months or need visit?  Last PHQ-9 1/4/21 score 4  PHQ-9 could be updated/completed through my chart

## 2021-06-04 ENCOUNTER — ALLIED HEALTH/NURSE VISIT (OUTPATIENT)
Dept: INTERNAL MEDICINE | Facility: CLINIC | Age: 66
End: 2021-06-04
Payer: MEDICARE

## 2021-06-04 DIAGNOSIS — Z48.02 VISIT FOR SUTURE REMOVAL: Primary | ICD-10-CM

## 2021-06-04 PROCEDURE — 99207 PR NO CHARGE NURSE ONLY: CPT

## 2021-06-04 NOTE — NURSING NOTE
Chief Complaint   Patient presents with     Suture Removal     11 sutures on left anterior leg       PRECIOUS Harrell at 2:09 PM on 6/4/2021

## 2021-09-07 DIAGNOSIS — F32.4 MAJOR DEPRESSIVE DISORDER IN PARTIAL REMISSION, UNSPECIFIED WHETHER RECURRENT (H): ICD-10-CM

## 2021-09-10 RX ORDER — BUPROPION HYDROCHLORIDE 300 MG/1
300 TABLET ORAL EVERY MORNING
Qty: 90 TABLET | Refills: 0 | Status: SHIPPED | OUTPATIENT
Start: 2021-09-10 | End: 2021-12-18

## 2021-09-10 NOTE — TELEPHONE ENCOUNTER
Last Clinic Visit: 1/4/2021  [facility] [place]  90 day chanda refill sent to the pharmacy - including instructions for patient to call the clinic and schedule an appointment.

## 2021-09-11 ENCOUNTER — HEALTH MAINTENANCE LETTER (OUTPATIENT)
Age: 66
End: 2021-09-11

## 2021-10-16 ENCOUNTER — ANCILLARY PROCEDURE (OUTPATIENT)
Dept: GENERAL RADIOLOGY | Facility: CLINIC | Age: 66
End: 2021-10-16
Attending: FAMILY MEDICINE
Payer: MEDICARE

## 2021-10-16 ENCOUNTER — OFFICE VISIT (OUTPATIENT)
Dept: URGENT CARE | Facility: URGENT CARE | Age: 66
End: 2021-10-16
Payer: MEDICARE

## 2021-10-16 VITALS
OXYGEN SATURATION: 98 % | HEART RATE: 75 BPM | DIASTOLIC BLOOD PRESSURE: 77 MMHG | SYSTOLIC BLOOD PRESSURE: 124 MMHG | TEMPERATURE: 97.1 F

## 2021-10-16 DIAGNOSIS — S49.91XA INJURY OF RIGHT SHOULDER, INITIAL ENCOUNTER: ICD-10-CM

## 2021-10-16 DIAGNOSIS — S42.031A CLOSED DISPLACED FRACTURE OF ACROMIAL END OF RIGHT CLAVICLE, INITIAL ENCOUNTER: ICD-10-CM

## 2021-10-16 DIAGNOSIS — S49.91XA INJURY OF RIGHT SHOULDER, INITIAL ENCOUNTER: Primary | ICD-10-CM

## 2021-10-16 PROCEDURE — 99214 OFFICE O/P EST MOD 30 MIN: CPT | Performed by: FAMILY MEDICINE

## 2021-10-16 PROCEDURE — 73030 X-RAY EXAM OF SHOULDER: CPT | Mod: RT | Performed by: RADIOLOGY

## 2021-10-16 RX ORDER — OXYCODONE HYDROCHLORIDE 5 MG/1
5 TABLET ORAL EVERY 6 HOURS PRN
Qty: 12 TABLET | Refills: 0 | Status: SHIPPED | OUTPATIENT
Start: 2021-10-16 | End: 2021-10-19

## 2021-10-16 NOTE — PATIENT INSTRUCTIONS
Patient Education     Collarbone Fracture  You have a break (fracture) in your collarbone (clavicle). This will cause swelling, pain, and bruising. The first few weeks will be the most painful. This is because deep breathing, coughing, or changing position from sitting to lying down may cause the broken ends to move slightly.    The fracture will heal in about 4 to 6 weeks. Most people can return to normal activities in about 3 months. In children, this injury will heal often by reshaping the bone back to normal. In adults, a noticeable bump in the bone may remain.   Treatment is with a sling or a special type of arm sling called a shoulder immobilizer. This supports your arm and eases pain. Most fractures can be treated this way. But more complicated breaks may need surgery. This is done by an orthopedic surgeon, who specializes in treating bone, muscle, joint, and tendon problems.   Home care  Follow these guidelines when caring for yourself or your child at home:    Put an ice pack on the injured area. Do this for 20 minutes every 1 to 2 hours on the first day. You can make an ice pack by wrapping a plastic bag of ice cubes in a thin towel. Keep using the ice pack 3 to 4 times a day for the next 2 days. Then use it as needed to ease pain and swelling.    If you were given a sling or shoulder immobilizer, wear it for comfort. You may take it off when you bathe or sleep. Take your arm out of the sling for a little while each day and move your shoulder, elbow, wrist, and hand to keep them from getting stiff.    Don t do any heavy lifting or raise the injured arm overhead until you are pain-free. Your child shouldn t play sports or do physical education class for at least 4 weeks, or until the healthcare provider says it s OK to do so.    You may use acetaminophen or ibuprofen to control pain, unless another pain medicine was prescribed. If you have chronic liver or kidney disease, talk with your healthcare  provider before using these medicines. Also talk with your provider if you ve had a stomach ulcer or digestive bleeding.    Your doctor may refer you to physical therapy for shoulder exercises once it starts to heal.    You may need surgery if the bones are out of place (displaced). Surgery will put them in better alignment while they heal. This leads to better strength when you have healed.  Follow-up care  Follow up with your healthcare provider within 1 week, or as advised. This is to be sure the bone is healing the way it should.   X-rays are occasionally taken of the fracture. You will be told of any new findings that may affect your care.   When to seek medical advice  Call your healthcare provider right away if any of these occur:    Swelling in your collarbone gets worse or the skin in the area becomes pale or discolored    Large area of bruising over the collarbone    Fingers become swollen, cold, blue, numb, or tingly    Shortness of breath, dizziness, or general weakness    Weakness or swelling in your arm    Any redness, drainage, or pus coming from the wound  [employer] last reviewed this educational content on 9/1/2019 2000-2021 [employer]. All rights reserved. This information is not intended as a substitute for professional medical care. Always follow your healthcare professional's instructions.

## 2021-10-16 NOTE — PROGRESS NOTES
Assessment & Plan     (S49.91XA) Injury of right shoulder, initial encounter  (primary encounter diagnosis)  Comment: Differentials discussed in detail.  X-ray findings reviewed independently, consistent with clavicular fracture.  Arm sling provided and suggested to continue rest and icing.  Oxycodone prescribed considering severity of pain.  Orthopedic referral placed for further review recommendations.  All questions answered.  Plan: XR Shoulder Right G/E 3 Views, oxyCODONE         (ROXICODONE) 5 MG tablet       (S42.031A) Closed displaced fracture of acromial end of right clavicle, initial encounter  Comment:   Plan: Orthopedic  Referral, oxyCODONE         (ROXICODONE) 5 MG tablet        Patient Instructions     Patient Education     Collarbone Fracture  You have a break (fracture) in your collarbone (clavicle). This will cause swelling, pain, and bruising. The first few weeks will be the most painful. This is because deep breathing, coughing, or changing position from sitting to lying down may cause the broken ends to move slightly.    The fracture will heal in about 4 to 6 weeks. Most people can return to normal activities in about 3 months. In children, this injury will heal often by reshaping the bone back to normal. In adults, a noticeable bump in the bone may remain.   Treatment is with a sling or a special type of arm sling called a shoulder immobilizer. This supports your arm and eases pain. Most fractures can be treated this way. But more complicated breaks may need surgery. This is done by an orthopedic surgeon, who specializes in treating bone, muscle, joint, and tendon problems.   Home care  Follow these guidelines when caring for yourself or your child at home:    Put an ice pack on the injured area. Do this for 20 minutes every 1 to 2 hours on the first day. You can make an ice pack by wrapping a plastic bag of ice cubes in a thin towel. Keep using the ice pack 3 to 4 times a day for the  next 2 days. Then use it as needed to ease pain and swelling.    If you were given a sling or shoulder immobilizer, wear it for comfort. You may take it off when you bathe or sleep. Take your arm out of the sling for a little while each day and move your shoulder, elbow, wrist, and hand to keep them from getting stiff.    Don t do any heavy lifting or raise the injured arm overhead until you are pain-free. Your child shouldn t play sports or do physical education class for at least 4 weeks, or until the healthcare provider says it s OK to do so.    You may use acetaminophen or ibuprofen to control pain, unless another pain medicine was prescribed. If you have chronic liver or kidney disease, talk with your healthcare provider before using these medicines. Also talk with your provider if you ve had a stomach ulcer or digestive bleeding.    Your doctor may refer you to physical therapy for shoulder exercises once it starts to heal.    You may need surgery if the bones are out of place (displaced). Surgery will put them in better alignment while they heal. This leads to better strength when you have healed.  Follow-up care  Follow up with your healthcare provider within 1 week, or as advised. This is to be sure the bone is healing the way it should.   X-rays are occasionally taken of the fracture. You will be told of any new findings that may affect your care.   When to seek medical advice  Call your healthcare provider right away if any of these occur:    Swelling in your collarbone gets worse or the skin in the area becomes pale or discolored    Large area of bruising over the collarbone    Fingers become swollen, cold, blue, numb, or tingly    Shortness of breath, dizziness, or general weakness    Weakness or swelling in your arm    Any redness, drainage, or pus coming from the wound  [employer] last reviewed this educational content on 9/1/2019 2000-2021 [employer]. All rights reserved. This  information is not intended as a substitute for professional medical care. Always follow your healthcare professional's instructions.                   Suresh Ndiaye MD  [facility] FER Tello is a 66 year old who presents for the following health issues    HPI     Musculoskeletal problem/pain  Onset/Duration: Yesterday  Description  Location: right shoulder   Joint Swelling: no  Redness: no  Pain: YES  Warmth: no  Intensity:  moderate  Progression of Symptoms:  same  Accompanying signs and symptoms:   Fevers: no  Numbness/tingling/weakness: no  History  Trauma to the area: YES, fell off of horse yesterday and landed on right shoulder and side, hurts to move.  Recent illness:  no  Previous similar problem: no  Previous evaluation:  no  Precipitating or alleviating factors:  Aggravating factors include: none  Therapies tried and outcome: rest/inactivity and over-the-counter analgesia        Review of Systems   Constitutional, HEENT, cardiovascular, pulmonary, gi and gu systems are negative, except as otherwise noted.      Objective    /77   Pulse 75   Temp 97.1  F (36.2  C) (Tympanic)   SpO2 98%   There is no height or weight on file to calculate BMI.  Physical Exam   GENERAL: alert and no distress  EYES: Eyes grossly normal to inspection, PERRL and conjunctivae and sclerae normal  HENT: normal cephalic/atraumatic, nose and mouth without ulcers or lesions, oropharynx clear and oral mucous membranes moist  NECK: no adenopathy, no asymmetry, masses, or scars and thyroid normal to palpation  RESP: lungs clear to auscultation - no rales, rhonchi or wheezes  CV: regular rates and rhythm, normal S1 S2, no S3 or S4 and no murmur, click or rub  MS: Limited right shoulder abduction and external rotation, tender on palpation with some bruising, no cervical spinal/paraspinal tenderness noted, normal upper extremities strength,  NEURO: Normal strength and tone, mentation intact and  speech normal  PSYCH: mentation appears normal, affect normal/bright      Results for orders placed or performed in visit on 10/16/21   XR Shoulder Right G/E 3 Views     Status: None (Preliminary result)    Narrative    EXAM: XR SHOULDER RIGHT G/E 3 VIEWS  LOCATION: [facility]  DATE/TIME: 10/16/2021, 8:43 AM    INDICATION: Right shoulder injury and pain.  COMPARISON: None.      Impression    IMPRESSION:  1.  Acute mildly comminuted and mildly displaced fracture of the right distal clavicle, roughly 17 mm medial to the acromioclavicular joint.  2.  Normal right acromioclavicular and glenohumeral joint alignment.  3.  Mild right acromioclavicular degenerative arthrosis.

## 2021-10-18 ENCOUNTER — TRANSFERRED RECORDS (OUTPATIENT)
Dept: HEALTH INFORMATION MANAGEMENT | Facility: CLINIC | Age: 66
End: 2021-10-18
Payer: COMMERCIAL

## 2021-11-03 ENCOUNTER — TRANSFERRED RECORDS (OUTPATIENT)
Dept: HEALTH INFORMATION MANAGEMENT | Facility: CLINIC | Age: 66
End: 2021-11-03
Payer: COMMERCIAL

## 2021-11-15 ENCOUNTER — APPOINTMENT (OUTPATIENT)
Dept: GENERAL RADIOLOGY | Facility: CLINIC | Age: 66
End: 2021-11-15
Attending: EMERGENCY MEDICINE
Payer: MEDICARE

## 2021-11-15 ENCOUNTER — HOSPITAL ENCOUNTER (EMERGENCY)
Facility: CLINIC | Age: 66
Discharge: HOME | End: 2021-11-15
Attending: EMERGENCY MEDICINE | Admitting: EMERGENCY MEDICINE
Payer: MEDICARE

## 2021-11-15 VITALS
BODY MASS INDEX: 21.66 KG/M2 | SYSTOLIC BLOOD PRESSURE: 152 MMHG | HEART RATE: 74 BPM | HEIGHT: 65 IN | WEIGHT: 130 LBS | DIASTOLIC BLOOD PRESSURE: 88 MMHG | RESPIRATION RATE: 16 BRPM | TEMPERATURE: 97.7 F | OXYGEN SATURATION: 99 %

## 2021-11-15 DIAGNOSIS — L08.9 DOG BITE OF LEFT HAND WITH INFECTION, INITIAL ENCOUNTER: ICD-10-CM

## 2021-11-15 DIAGNOSIS — S61.452A DOG BITE OF LEFT HAND WITH INFECTION, INITIAL ENCOUNTER: ICD-10-CM
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
## 2021-11-15 PROCEDURE — 90471 IMMUNIZATION ADMIN: CPT | Performed by: EMERGENCY MEDICINE

## 2021-11-15 PROCEDURE — 250N000011 HC RX IP 250 OP 636: Performed by: EMERGENCY MEDICINE

## 2021-11-15 PROCEDURE — 250N000013 HC RX MED GY IP 250 OP 250 PS 637: Performed by: EMERGENCY MEDICINE

## 2021-11-15 PROCEDURE — 90715 TDAP VACCINE 7 YRS/> IM: CPT | Performed by: EMERGENCY MEDICINE

## 2021-11-15 PROCEDURE — 73130 X-RAY EXAM OF HAND: CPT | Mod: LT

## 2021-11-15 PROCEDURE — 99283 EMERGENCY DEPT VISIT LOW MDM: CPT | Mod: 25

## 2021-11-15 PROCEDURE — 12001 RPR S/N/AX/GEN/TRNK 2.5CM/<: CPT

## 2021-11-15 RX ADMIN — AMOXICILLIN AND CLAVULANATE POTASSIUM 1 TABLET: 875; 125 TABLET, FILM COATED ORAL at 11:03

## 2021-11-15 RX ADMIN — CLOSTRIDIUM TETANI TOXOID ANTIGEN (FORMALDEHYDE INACTIVATED), CORYNEBACTERIUM DIPHTHERIAE TOXOID ANTIGEN (FORMALDEHYDE INACTIVATED), BORDETELLA PERTUSSIS TOXOID ANTIGEN (GLUTARALDEHYDE INACTIVATED), BORDETELLA PERTUSSIS FILAMENTOUS HEMAGGLUTININ ANTIGEN (FORMALDEHYDE INACTIVATED), BORDETELLA PERTUSSIS PERTACTIN ANTIGEN, AND BORDETELLA PERTUSSIS FIMBRIAE 2/3 ANTIGEN 0.5 ML: 5; 2; 2.5; 5; 3; 5 INJECTION, SUSPENSION INTRAMUSCULAR at 11:40

## 2021-11-15 ASSESSMENT — ENCOUNTER SYMPTOMS
FEVER: 0
JOINT SWELLING: 1
WOUND: 1

## 2021-11-15 ASSESSMENT — MIFFLIN-ST. JEOR: SCORE: 1125.56

## 2021-11-15 NOTE — ED PROVIDER NOTES
"  History   Chief Complaint:  Dog Bite       HPI   Elisha Gao is a 66 year old female who presents with a dog bite.  The patient reports that yesterday around noon while at the dog park she was accidentally bit in the left hand and left middle finger by her own dog.  Her dog got into a fight with another dog and she was trying to get her dog away when she got bit. She notes that her own dog has been up-to-date on all of its necessary vaccines. She notes that in order to care for this wound over the past 24 hours, she has used gauze, cleaned it, and applied antibiotic ointment.  Patient is right-handed.  No other injuries or trauma.  No pain traveling up her left forearm.  No fevers. She notes some swelling and redness to the back of the hand.    Review of Systems   Constitutional: Negative for fever.   Musculoskeletal: Positive for joint swelling.   Skin: Positive for wound.   All other systems reviewed and are negative.      Allergies:  Alendronic Acid  Raloxifene  Risedronate    Medications:  Wellbutrin  Celexa    Past Medical History:    Chronic low back pain  Depression     Past Surgical History:    Hysterectomy  Lasik bilateral     Family History:    Colon cancer (Mother)  Lung cancer (Mother)  Macular degeneration (Mother)  Dementia (Mother)     Social History:  Presents to ED alone.  PCP: Debi Pizarro    Physical Exam     Patient Vitals for the past 24 hrs:   BP Temp Temp src Pulse Resp SpO2 Height Weight   11/15/21 0905 (!) 152/88 97.7  F (36.5  C) Temporal 74 16 99 % 1.651 m (5' 5\") 59 kg (130 lb)       Physical Exam  General: Well appearing, nontoxic. Resting comfortably  Head:  Scalp, face, and head appear normal  Eyes:  Pupils equal, round, and reactive to light    Conjunctivae noninjected and sclera white  ENT:    The nose is normal    Ears/pinnae are normal  Neck:  Normal range of motion  CV:  Radial pulses 2+ bilaterally . Left hand warm and well perfused.  MSK:  Wounds consistent with dog bite to " the dorsum of the left hand and left middle finger overlying the proximal phalanx.  2.5 cm linear laceration with gaping wound edges overlying the left middle finger proximal phalanx.  No deep structure involvement.  No visualized foreign bodies.  Additional 3-4 small puncture wounds to the dorsum of the hand.  There is moderate soft tissue swelling, erythema, warmth and tenderness to palpation diffusely over the dorsum of the hand.  No streaking erythema proximally.  Patient able to move the fingers normally.  Sensation intact in all peripheral distributions of the left hand.  No purulence.  No injuries to the wrist or forearm.  Skin:  No rash or lesions noted.  Neuro: Speech is normal and fluent    Moves all extremities spontaneously  Psych:  Awake, Alert. Normal affect      Appropriate interactions    Emergency Department Course   Imaging:  XR Hand Left G/E 3 Views   Preliminary Result   IMPRESSION: No evidence of acute fracture or radiopaque foreign body.        Procedures     Laceration Repair        LACERATION:  A simple minimally Contaminated 2.5 cm laceration.      LOCATION:  Left middle finger      FUNCTION:  Distally sensation, circulation and motor are intact.      ANESTHESIA:  Digital block using 0.5% bupivacaine      PREPARATION:  Irrigation and Scrubbing with Normal Saline      DEBRIDEMENT:  no debridement      CLOSURE:  Wound was closed with One Layer.  Skin loosely closed with 3 x 3.0 Ethylon using interrupted sutures.    Emergency Department Course:  Reviewed:  0952 I reviewed the patient's nursing notes, vitals, past medical records, Care Everywhere.     Assessments/Consults:  0958 I performed an exam as documented above.    Interventions:  Medications   Tdap (tetanus-diphtheria-acell pertussis) (ADACEL) injection 0.5 mL (has no administration in time range)   amoxicillin-clavulanate (AUGMENTIN) 875-125 MG per tablet 1 tablet (1 tablet Oral Given 11/15/21 1103)     Disposition:  The patient was  discharged to home.     Impression & Plan   Medical Decision Making:  Elisha Gao is a 66 year old female who presents for evaluation of a dog bite to left middle finger with associated multiple puncture wounds to the dorsum of the hand and soft tissue swelling and erythema concerning for early infection.  The workup here in the ED shows no signs of compartment syndrome, tendon or bone injury.  Radiographs negative for fracture or bony abnormality.  No signs of foreign body or dog teeth in wound.  Dog is known to the patient as the owner and is fully vaccinated for rabies. Therefore no rabies shots indicated from ED.  The wounds were scrubbed and washed out with high pressure irrigation, in addition to a digital block as documented above.  The middle finger laceration was gaping with exposed subcutaneous tissue.  I discussed with her the risks of wound infection and felt that it would be most appropriate to partially close this wound loosely.  Will start augmentin and have them observe for signs of infection (pain, redness, warmth, red streaks, etc).  Patient is otherwise neurovascularly intact.  I recommended follow-up with orthopedics for any worsening.  Tdap updated.  Sutures should be removed in 10 to 14 days.  The patient was agreeable with plan of care.  Close return precautions were provided and she was discharged in stable and improved condition.    Diagnosis:    ICD-10-CM    1. Dog bite of left hand with infection, initial encounter  S61.452A     L08.9     W54.0XXA        Discharge Medications:  New Prescriptions    AMOXICILLIN-CLAVULANATE (AUGMENTIN) 875-125 MG TABLET    Take 1 tablet by mouth 2 times daily for 7 days       Scribe Disclosure:  Nikos GUILLEN, am serving as a scribe at 9:56 AM on 11/15/2021 to document services personally performed by Yoel Blas MD based on my observations and the provider's statements to me.      Yoel Blas MD  11/15/21 7319

## 2021-11-15 NOTE — ED TRIAGE NOTES
Bitten multiple times over left hand by her own dog while breaking up a dog fight. Left hand is swollen and painful.

## 2021-11-22 ENCOUNTER — TRANSFERRED RECORDS (OUTPATIENT)
Dept: HEALTH INFORMATION MANAGEMENT | Facility: CLINIC | Age: 66
End: 2021-11-22
Payer: COMMERCIAL

## 2021-12-27 ENCOUNTER — TRANSFERRED RECORDS (OUTPATIENT)
Dept: HEALTH INFORMATION MANAGEMENT | Facility: CLINIC | Age: 66
End: 2021-12-27
Payer: COMMERCIAL

## 2022-01-12 DIAGNOSIS — F32.4 MAJOR DEPRESSIVE DISORDER IN PARTIAL REMISSION, UNSPECIFIED WHETHER RECURRENT (H): ICD-10-CM

## 2022-01-14 DIAGNOSIS — F32.4 MAJOR DEPRESSIVE DISORDER IN PARTIAL REMISSION, UNSPECIFIED WHETHER RECURRENT (H): ICD-10-CM

## 2022-01-14 RX ORDER — CITALOPRAM HYDROBROMIDE 40 MG/1
40 TABLET ORAL DAILY
Qty: 90 TABLET | Refills: 0 | Status: SHIPPED | OUTPATIENT
Start: 2022-01-14 | End: 2022-03-02

## 2022-01-16 RX ORDER — CITALOPRAM HYDROBROMIDE 40 MG/1
40 TABLET ORAL DAILY
Qty: 90 TABLET | Refills: 0 | OUTPATIENT
Start: 2022-01-16

## 2022-02-26 ENCOUNTER — HEALTH MAINTENANCE LETTER (OUTPATIENT)
Age: 67
End: 2022-02-26

## 2022-03-01 ASSESSMENT — ACTIVITIES OF DAILY LIVING (ADL)
IN_THE_PAST_7_DAYS,_DID_YOU_NEED_HELP_FROM_OTHERS_TO_TAKE_CARE_OF_THINGS_SUCH_AS_LAUNDRY_AND_HOUSEKEEPING,_BANKING,_SHOPPING,_USING_THE_TELEPHONE,_FOOD_PREPARATION,_TRANSPORTATION,_OR_TAKING_YOUR_OWN_MEDICATIONS?: N
IN_THE_PAST_7_DAYS,_DID_YOU_NEED_HELP_FROM_OTHERS_TO_PERFORM_EVERYDAY_ACTIVITIES_SUCH_AS_EATING,_GETTING_DRESSED,_GROOMING,_BATHING,_WALKING,_OR_USING_THE_TOILET: N

## 2022-03-02 ENCOUNTER — OFFICE VISIT (OUTPATIENT)
Dept: INTERNAL MEDICINE | Facility: CLINIC | Age: 67
End: 2022-03-02
Payer: MEDICARE

## 2022-03-02 ENCOUNTER — HOSPITAL ENCOUNTER (OUTPATIENT)
Facility: CLINIC | Age: 67
Discharge: HOME | End: 2022-03-02
Attending: INTERNAL MEDICINE | Admitting: INTERNAL MEDICINE
Payer: MEDICARE

## 2022-03-02 ENCOUNTER — LAB (OUTPATIENT)
Dept: LAB | Facility: CLINIC | Age: 67
End: 2022-03-02
Payer: MEDICARE

## 2022-03-02 VITALS
HEART RATE: 60 BPM | OXYGEN SATURATION: 98 % | SYSTOLIC BLOOD PRESSURE: 125 MMHG | DIASTOLIC BLOOD PRESSURE: 81 MMHG | HEIGHT: 65 IN | WEIGHT: 134 LBS | BODY MASS INDEX: 22.33 KG/M2

## 2022-03-02 DIAGNOSIS — N39.41 URGE INCONTINENCE OF URINE: ICD-10-CM

## 2022-03-02 DIAGNOSIS — R82.90 ABNORMAL FINDING ON URINALYSIS: ICD-10-CM

## 2022-03-02 DIAGNOSIS — Z12.31 ENCOUNTER FOR SCREENING MAMMOGRAM FOR BREAST CANCER: ICD-10-CM

## 2022-03-02 DIAGNOSIS — F32.4 MAJOR DEPRESSIVE DISORDER IN PARTIAL REMISSION, UNSPECIFIED WHETHER RECURRENT (H): ICD-10-CM

## 2022-03-02 DIAGNOSIS — E78.2 MIXED HYPERLIPIDEMIA: ICD-10-CM

## 2022-03-02 DIAGNOSIS — Z00.00 ENCOUNTER FOR MEDICARE ANNUAL WELLNESS EXAM: ICD-10-CM

## 2022-03-02 DIAGNOSIS — Z78.0 ASYMPTOMATIC POSTMENOPAUSAL STATE: ICD-10-CM

## 2022-03-02 DIAGNOSIS — Z00.00 ROUTINE GENERAL MEDICAL EXAMINATION AT A HEALTH CARE FACILITY: Primary | ICD-10-CM

## 2022-03-02 LAB
ALBUMIN SERPL-MCNC: 3.8 G/DL (ref 3.4–5)
ALBUMIN UR-MCNC: NEGATIVE MG/DL
ALP SERPL-CCNC: 72 U/L (ref 40–150)
ALT SERPL W P-5'-P-CCNC: 18 U/L (ref 0–50)
ANION GAP SERPL CALCULATED.3IONS-SCNC: 5 MMOL/L (ref 3–14)
APPEARANCE UR: CLEAR
AST SERPL W P-5'-P-CCNC: 14 U/L (ref 0–45)
BILIRUB SERPL-MCNC: 0.6 MG/DL (ref 0.2–1.3)
BILIRUB UR QL STRIP: NEGATIVE
BUN SERPL-MCNC: 20 MG/DL (ref 7–30)
CALCIUM SERPL-MCNC: 9 MG/DL (ref 8.5–10.1)
CHLORIDE BLD-SCNC: 108 MMOL/L (ref 94–109)
CHOLEST SERPL-MCNC: 222 MG/DL
CO2 SERPL-SCNC: 26 MMOL/L (ref 20–32)
COLOR UR AUTO: YELLOW
CREAT SERPL-MCNC: 0.92 MG/DL (ref 0.52–1.04)
FASTING STATUS PATIENT QL REPORTED: YES
GFR SERPL CREATININE-BSD FRML MDRD: 68 ML/MIN/1.73M2
GLUCOSE BLD-MCNC: 96 MG/DL (ref 70–99)
GLUCOSE UR STRIP-MCNC: NEGATIVE MG/DL
HDLC SERPL-MCNC: 72 MG/DL
HGB UR QL STRIP: NEGATIVE
KETONES UR STRIP-MCNC: NEGATIVE MG/DL
LDLC SERPL CALC-MCNC: 129 MG/DL
LEUKOCYTE ESTERASE UR QL STRIP: ABNORMAL
MUCOUS THREADS #/AREA URNS LPF: PRESENT /LPF
NITRATE UR QL: NEGATIVE
NONHDLC SERPL-MCNC: 150 MG/DL
PH UR STRIP: 6 [PH] (ref 5–7)
POTASSIUM BLD-SCNC: 4.3 MMOL/L (ref 3.4–5.3)
PROT SERPL-MCNC: 7.1 G/DL (ref 6.8–8.8)
RBC URINE: 2 /HPF
SODIUM SERPL-SCNC: 139 MMOL/L (ref 133–144)
SP GR UR STRIP: 1.03 (ref 1–1.03)
SQUAMOUS EPITHELIAL: 1 /HPF
TRIGL SERPL-MCNC: 103 MG/DL
UROBILINOGEN UR STRIP-MCNC: NORMAL MG/DL
WBC URINE: 10 /HPF

## 2022-03-02 PROCEDURE — 80061 LIPID PANEL: CPT | Performed by: PATHOLOGY

## 2022-03-02 PROCEDURE — 80053 COMPREHEN METABOLIC PANEL: CPT | Performed by: PATHOLOGY

## 2022-03-02 PROCEDURE — 87086 URINE CULTURE/COLONY COUNT: CPT

## 2022-03-02 PROCEDURE — G0439 PPPS, SUBSEQ VISIT: HCPCS | Performed by: INTERNAL MEDICINE

## 2022-03-02 PROCEDURE — 36415 COLL VENOUS BLD VENIPUNCTURE: CPT | Performed by: PATHOLOGY

## 2022-03-02 PROCEDURE — 81001 URINALYSIS AUTO W/SCOPE: CPT | Performed by: PATHOLOGY

## 2022-03-02 PROCEDURE — 99000 SPECIMEN HANDLING OFFICE-LAB: CPT | Performed by: PATHOLOGY

## 2022-03-02 RX ORDER — CITALOPRAM HYDROBROMIDE 40 MG/1
40 TABLET ORAL DAILY
Qty: 90 TABLET | Refills: 3 | Status: SHIPPED | OUTPATIENT
Start: 2022-03-02 | End: 2023-02-10

## 2022-03-02 RX ORDER — MULTIVIT-MIN/IRON/FOLIC ACID/K 18-600-40
3 CAPSULE ORAL EVERY MORNING
COMMUNITY

## 2022-03-02 RX ORDER — BUPROPION HYDROCHLORIDE 300 MG/1
300 TABLET ORAL EVERY MORNING
Qty: 90 TABLET | Refills: 3 | Status: SHIPPED | OUTPATIENT
Start: 2022-03-02 | End: 2022-06-17

## 2022-03-02 ASSESSMENT — ANXIETY QUESTIONNAIRES
7. FEELING AFRAID AS IF SOMETHING AWFUL MIGHT HAPPEN: NOT AT ALL
5. BEING SO RESTLESS THAT IT IS HARD TO SIT STILL: NOT AT ALL
3. WORRYING TOO MUCH ABOUT DIFFERENT THINGS: NOT AT ALL
2. NOT BEING ABLE TO STOP OR CONTROL WORRYING: NOT AT ALL
IF YOU CHECKED OFF ANY PROBLEMS ON THIS QUESTIONNAIRE, HOW DIFFICULT HAVE THESE PROBLEMS MADE IT FOR YOU TO DO YOUR WORK, TAKE CARE OF THINGS AT HOME, OR GET ALONG WITH OTHER PEOPLE: NOT DIFFICULT AT ALL
1. FEELING NERVOUS, ANXIOUS, OR ON EDGE: NOT AT ALL
6. BECOMING EASILY ANNOYED OR IRRITABLE: NOT AT ALL
GAD7 TOTAL SCORE: 0

## 2022-03-02 ASSESSMENT — PATIENT HEALTH QUESTIONNAIRE - PHQ9
5. POOR APPETITE OR OVEREATING: NOT AT ALL
SUM OF ALL RESPONSES TO PHQ QUESTIONS 1-9: 0

## 2022-03-02 ASSESSMENT — PAIN SCALES - GENERAL: PAINLEVEL: NO PAIN (0)

## 2022-03-02 NOTE — NURSING NOTE
Chief Complaint   Patient presents with     Wellness Visit     medicare annual wellness visit       PRECIOUS Mcguire at 7:47 AM on 3/2/2022

## 2022-03-02 NOTE — PROGRESS NOTES
History of Present Illness:  Ms. Gao is a 66 year old female who presents for  Chief Complaint   Patient presents with     Wellness Visit     medicare annual wellness visit     Clavical fracture after falling off horse, managed nonoperatively, also got a cut in leg    Reporting leaky bladder, ongoing for last few month, she s/p JESS, no pregnancies, feeling of incomplete emptying, she drinks diet coke    No COVID, goes to Gym 3 times, spouse healthy  Mom in [place], 95yo, no COVID    Mental health is better on wellbutrin, mild tremors    No other health concerns    Routine Health Maintenence:  Depression Screening:   PHQ-2 Score:     PHQ-2 ( 1999 Pf[employer]) 1/4/2021 1/15/2020   Q1: Little interest or pleasure in doing things 1 0   Q2: Feeling down, depressed or hopeless 1 0   PHQ-2 Score 2 0   PHQ-2 Total Score (12-17 Years)- Positive if 3 or more points; Administer PHQ-A if positive 2 0   Q1: Little interest or pleasure in doing things - -   Q2: Feeling down, depressed or hopeless - -   PHQ-2 Score - -     Lung Ca Screening (>30 pk age 55-79 or >20 py age 50-79 + RF): does not qualify  Colonoscopy (50-75 yrs): 2012, 4/19, divertic, 10 year follow up  Dexa (>65W or 70M yrs): 4/19   The most negative and valid T-score of -1.9 at the level of the right femoral neck, corresponds with low bone density.    12/11 (on meds for 6 months)  RESULT: LUMBAR SPINE  BMD (gm/cm2): 0.959  T score: -0.88  RESULT: HIP  BMD (gm/cm2): 0.724  T score: -2.25  FRAX RISK CALCULATION    10 YEAR TOTAL FRACTURE RISK 7 %   10 YEAR HIP FRACTURE RISK 1 %    Mammogram (40-75 yrs): 7/19  Pap (21-65 yrs): No results found for: PAP  Tob/EtOH: screen neg  Lifestyle factors: reviewed  Advanced Directive: deferred          Review of external notes as documented above                   A detailed Review of Systems was performed, verified and is negative except as documented in the HPI.  All health questionnaires were reviewed, verified and relevant  information documented above.      Wellness Assessment   General Health And Wellness (Daily Health Habits )     Question 3/1/2022  9:49 AM CST - Filed by Patient    Do you have a special diet?  None    Do you have a snack between meals or at bedtime?  No    How many servings of fruit do you consume daily? (1 serving = half cup) 0 - 2 Servings    How many servings of vegetables do you consume daily? (1 servings = half cup)  0 - 2 Servings    How many servings of high fiber or whole grain foods do you consume daily? (1 serving = 1 slice of whole wheat bread, 1 cup of whole grain cereal, or   cup brown rice or oatmeal)  0 - 2 Servings    How many servings of fried or high-fat foods do you consume daily? (Examples: fried chicken, potato chips, doughnuts)  0 - 2 servings    How many sugar-sweetened (not diet) beverages do you consume daily?  0 - 2 Servings    Do you get at least 3 servings of foods that have calcium each day (dairy, green leafy vegetables, etc)?   Yes    Do you take a Vitamin D supplement?  Yes    On average, how many days per week do you engage in moderate to strenuous exercise like a brisk walk? 4    Do you have any problems taking medications regularly?  No    How often is stress a problem for you in handling such things as your health, finances, relationships, or work?  Sometimes    How often do you get the social and emotional support you need?  Always    In a typical week, how many times do you talk on the phone or get together with friends or family? sometimes    In a typical week, how many times do you attend events like Alevism/Mu-ism services, club meetings, or other organizational meetings? rarely    Do you have sleep apnea, excessive snoring or daytime drowsiness? (select all that apply) No    In the past 7 days, how many days did you drink alcohol? 3    On days when you drank alcohol, how often did you have 4 or more alcoholic drinks on one occasion? Never    Do you ever drive after  drinking, or ride with a  who has been drinking?  No    Have you used a smokeless tobacco product? No    Have you smoked 100 cigarettes or more in your entire life? No    Do you always fasten your seat belt when you are in a car?  Yes    Annual wellness visits     In general, would you say your health is: Very good    How would you describe the condition of your mouth and teeth - including false teeth or dentures? Very good    In the past 7 days, did you need help from others to perform everyday activities such as eating, getting dressed, grooming, bathing, walking, or using the toilet?  No    In the past 7 days, did you need help from others to take care of things such as laundry and housekeeping, banking, shopping, using the telephone, food preparation, transportation, or taking your own medications?  No      Exercise Activity     Question 3/1/2022  9:49 AM CST - Filed by Patient    How intense is your typical exercise? Moderate (like brisk walking)    On average how many minutes do you engage in exercise at this level? 60    Minutes per week doing moderate to strenuous exercise: (range: 0 - 12363) 0            Past Medical History:  Past Medical History:   Diagnosis Date     Chronic low back pain     for a couple years, worse at night, not evaluated     Depression        Past Surgical History:  Past Surgical History:   Procedure Laterality Date     HYSTERECTOMY  1987    endometriosis, ovarian cysts     LASIK Bilateral        Active Meds:  Current Outpatient Medications   Medication     buPROPion (WELLBUTRIN XL) 300 MG 24 hr tablet     citalopram (CELEXA) 40 MG tablet     magnesium 250 MG tablet     Vitamin D, Cholecalciferol, 25 MCG (1000 UT) TABS     No current facility-administered medications for this visit.        Allergies:  Alendronic acid, Raloxifene, and Risedronate    Family History:  family history includes Bipolar Disorder in her brother; Colon Cancer in her mother; Dementia in her mother; Heart  "Disease in her father; Lung Cancer in her mother; Macular Degeneration in her mother.    Social History:  Social History     Tobacco Use     Smoking status: Former Smoker     Packs/day: 0.50     Years: 20.00     Pack years: 10.00     Quit date:      Years since quittin.1     Smokeless tobacco: Never Used   Substance Use Topics     Alcohol use: Yes     Alcohol/week: 3.0 standard drinks     Types: 3 Glasses of wine per week     Drug use: None       Physical Exam:  Vitals: /81 (BP Location: Right arm, Patient Position: Sitting, Cuff Size: Adult Regular)   Pulse 60   Ht 1.651 m (5' 5\")   Wt 60.8 kg (134 lb)   SpO2 98%   BMI 22.30 kg/m    Constitutional: Alert, oriented, pleasant, no acute distress  Head: Normocephalic, atraumatic  Eyes: Extra-ocular movements intact, pupils equally round and reactive bilaterally, no scleral icterus  ENT: Oropharynx clear, moist mucus membranes, good dentition  Neck: Supple, no lymphadenopathy  Cardiovascular: Regular rate and rhythm, no murmurs, rubs or gallops, peripheral pulses full/symmetric  Respiratory: Good air movement bilaterally, lungs clear, no wheezes/rales/rhonchi  GI: Abdomen soft, bowel sounds present, nondistended, nontender, no organomegaly or masses, no rebound/guarding  Musculoskeletal: No edema, normal muscle tone, normal gait  Neurologic: Alert and oriented, cranial nerves 2-12 intact, grossly non-focal  Skin: No rashes/lesions  Psychiatric: normal mentation, affect and mood      Diagnostics:  Labs reviewed in Epic          Assessment and Plan:  Elisha was seen today for wellness visit.    Diagnoses and all orders for this visit:    Routine general medical examination at a health care facility  Encounter for Medicare annual wellness exam  Routine health care reviewed and updated as appropriate.    Major depressive disorder in partial remission, unspecified whether recurrent (H)  PHQ is zero, reports efficacy of medications  -     buPROPion " (WELLBUTRIN XL) 300 MG 24 hr tablet; Take 1 tablet (300 mg) by mouth every morning For additional refills, please schedule annual appointment at [phone]  -     citalopram (CELEXA) 40 MG tablet; Take 1 tablet (40 mg) by mouth daily    Urge incontinence of urine  Discussed lifestyle tips, bladder exercises, if not effective, could consider meds or urology referral  -     Comprehensive metabolic panel; Future  -     UA with Micro reflex to Culture; Future    Encounter for screening mammogram for breast cancer  -     Mammogram, screening w guero (3D); Future    Mixed hyperlipidemia  -     Lipid panel reflex to direct LDL Fasting; Future    Asymptomatic postmenopausal state  Mom has osteoporosis, Elisha has had only one fracture in her life, takes D, eats Calcium  -     Dexa hip/pelvis/spine*; Future          Debi Pizarro MD  Internal Medicine

## 2022-03-03 LAB — BACTERIA UR CULT: NORMAL

## 2022-03-03 RX ORDER — NITROFURANTOIN 25; 75 MG/1; MG/1
100 CAPSULE ORAL 2 TIMES DAILY
Qty: 10 CAPSULE | Refills: 0 | Status: SHIPPED | OUTPATIENT
Start: 2022-03-03 | End: 2022-03-08

## 2022-03-03 ASSESSMENT — ANXIETY QUESTIONNAIRES: GAD7 TOTAL SCORE: 0

## 2022-03-17 ENCOUNTER — ANCILLARY PROCEDURE (OUTPATIENT)
Dept: BONE DENSITY | Facility: CLINIC | Age: 67
End: 2022-03-17
Attending: INTERNAL MEDICINE
Payer: MEDICARE

## 2022-03-17 ENCOUNTER — ANCILLARY PROCEDURE (OUTPATIENT)
Dept: MAMMOGRAPHY | Facility: CLINIC | Age: 67
End: 2022-03-17
Attending: INTERNAL MEDICINE
Payer: MEDICARE

## 2022-03-17 DIAGNOSIS — Z12.31 ENCOUNTER FOR SCREENING MAMMOGRAM FOR BREAST CANCER: ICD-10-CM

## 2022-03-17 DIAGNOSIS — Z78.0 ASYMPTOMATIC POSTMENOPAUSAL STATE: ICD-10-CM

## 2022-03-17 PROCEDURE — 77063 BREAST TOMOSYNTHESIS BI: CPT | Mod: GC

## 2022-03-17 PROCEDURE — 77080 DXA BONE DENSITY AXIAL: CPT | Performed by: INTERNAL MEDICINE

## 2022-03-17 PROCEDURE — 77067 SCR MAMMO BI INCL CAD: CPT | Mod: GC

## 2022-03-24 ENCOUNTER — ANCILLARY PROCEDURE (OUTPATIENT)
Dept: MAMMOGRAPHY | Facility: CLINIC | Age: 67
End: 2022-03-24
Attending: INTERNAL MEDICINE
Payer: MEDICARE

## 2022-03-24 DIAGNOSIS — R92.8 ABNORMAL MAMMOGRAM OF RIGHT BREAST: ICD-10-CM

## 2022-03-24 PROCEDURE — 77065 DX MAMMO INCL CAD UNI: CPT | Mod: RT | Performed by: RADIOLOGY

## 2022-03-24 PROCEDURE — 77061 BREAST TOMOSYNTHESIS UNI: CPT | Mod: RT | Performed by: RADIOLOGY

## 2022-03-24 PROCEDURE — 76642 ULTRASOUND BREAST LIMITED: CPT | Mod: RT | Performed by: RADIOLOGY

## 2022-03-28 ENCOUNTER — ANCILLARY PROCEDURE (OUTPATIENT)
Dept: MAMMOGRAPHY | Facility: CLINIC | Age: 67
End: 2022-03-28
Attending: INTERNAL MEDICINE
Payer: MEDICARE

## 2022-03-28 DIAGNOSIS — N63.0 BREAST MASS: ICD-10-CM

## 2022-03-28 DIAGNOSIS — R92.8 ABNORMAL MAMMOGRAM OF RIGHT BREAST: ICD-10-CM

## 2022-03-28 PROCEDURE — 88305 TISSUE EXAM BY PATHOLOGIST: CPT | Mod: 26 | Performed by: PATHOLOGY

## 2022-03-28 PROCEDURE — 19084 BX BREAST ADD LESION US IMAG: CPT | Mod: RT | Performed by: RADIOLOGY

## 2022-03-28 PROCEDURE — 88342 IMHCHEM/IMCYTCHM 1ST ANTB: CPT | Mod: TC,XU | Performed by: INTERNAL MEDICINE

## 2022-03-28 PROCEDURE — 77066 DX MAMMO INCL CAD BI: CPT | Performed by: RADIOLOGY

## 2022-03-28 PROCEDURE — G0279 TOMOSYNTHESIS, MAMMO: HCPCS | Performed by: RADIOLOGY

## 2022-03-28 PROCEDURE — 88341 IMHCHEM/IMCYTCHM EA ADD ANTB: CPT | Mod: 26 | Performed by: PATHOLOGY

## 2022-03-28 PROCEDURE — 88377 M/PHMTRC ALYS ISHQUANT/SEMIQ: CPT | Mod: 26 | Performed by: MEDICAL GENETICS

## 2022-03-28 PROCEDURE — 19083 BX BREAST 1ST LESION US IMAG: CPT | Mod: RT | Performed by: RADIOLOGY

## 2022-03-28 PROCEDURE — 88342 IMHCHEM/IMCYTCHM 1ST ANTB: CPT | Mod: 26 | Performed by: PATHOLOGY

## 2022-03-28 PROCEDURE — 88377 M/PHMTRC ALYS ISHQUANT/SEMIQ: CPT | Performed by: INTERNAL MEDICINE

## 2022-03-28 PROCEDURE — 88360 TUMOR IMMUNOHISTOCHEM/MANUAL: CPT | Mod: 26 | Performed by: PATHOLOGY

## 2022-03-28 RX ORDER — IOPAMIDOL 612 MG/ML
91 INJECTION, SOLUTION INTRATHECAL ONCE
Status: COMPLETED | OUTPATIENT
Start: 2022-03-28 | End: 2022-03-28

## 2022-03-28 RX ORDER — LIDOCAINE HYDROCHLORIDE 10 MG/ML
9 INJECTION, SOLUTION EPIDURAL; INFILTRATION; INTRACAUDAL; PERINEURAL ONCE
Status: COMPLETED | OUTPATIENT
Start: 2022-03-28 | End: 2022-03-28

## 2022-03-28 RX ADMIN — IOPAMIDOL 91 ML: 612 INJECTION, SOLUTION INTRATHECAL at 13:13

## 2022-03-28 RX ADMIN — LIDOCAINE HYDROCHLORIDE 9 ML: 10 INJECTION, SOLUTION EPIDURAL; INFILTRATION; INTRACAUDAL; PERINEURAL at 15:10

## 2022-03-28 RX ADMIN — LIDOCAINE HYDROCHLORIDE 9 ML: 10 INJECTION, SOLUTION EPIDURAL; INFILTRATION; INTRACAUDAL; PERINEURAL at 15:11

## 2022-03-30 LAB
PATH REPORT.COMMENTS IMP SPEC: ABNORMAL
PATH REPORT.COMMENTS IMP SPEC: YES
PATH REPORT.FINAL DX SPEC: ABNORMAL
PATH REPORT.GROSS SPEC: ABNORMAL
PATH REPORT.MICROSCOPIC SPEC OTHER STN: ABNORMAL
PATH REPORT.RELEVANT HX SPEC: ABNORMAL
PATHOLOGY SYNOPTIC REPORT: ABNORMAL
PHOTO IMAGE: ABNORMAL

## 2022-03-31 ENCOUNTER — PATIENT OUTREACH (OUTPATIENT)
Dept: ONCOLOGY | Facility: CLINIC | Age: 67
End: 2022-03-31
Payer: COMMERCIAL

## 2022-03-31 ENCOUNTER — TELEPHONE (OUTPATIENT)
Dept: MAMMOGRAPHY | Facility: CLINIC | Age: 67
End: 2022-03-31
Payer: COMMERCIAL

## 2022-03-31 DIAGNOSIS — C50.911 INVASIVE DUCTAL CARCINOMA OF BREAST, FEMALE, RIGHT (H): Primary | ICD-10-CM

## 2022-03-31 NOTE — PROGRESS NOTES
New Patient Oncology Nurse Navigator Note     Referring provider: Debi Pizarro MD      Referring Clinic/Organization: in [facility]     Referred to (specialty:) Medical Oncology and Cancer Surgery      Date Referral Received: March 31, 2022     Evaluation for:  Breast cancer     Clinical History (per Nurse review of records provided):    Elisha had a bilateral screening mammogram on 3/17/22 showing a possible asymmetry in the right breast at the 10 o'clock position, middle depth, approximately 5cm from the nipple.  A right breast diagnostic mammogram and ultrasound followed on 3/24/22.  Additional mammography views confirmed the presence of a 4 mm spiculated mass at the 11:00 position 4 cm from the nipple as did ultrasound.     A contrast enhanced mammogram was performed on 3/28 and the suspicious mass in the right breast enhances and measures 7 mm. In the right breast at 11:00 posterior depth, there is a 4 mm enhancing focus. Preprocedure imaging by ultrasound demonstrates an oval hypoechoic mass in the right breast at 11:00, 6 cm from the nipple measuring 4 x 3 x 3 mm.      3/28/22 -   A. RIGHT breast, 11:00, 4 cm from nipple, ultrasound guided core biopsy:  -INVASIVE BREAST CARCINOMA OF NO SPECIAL TYPE (INVASIVE DUCTAL CARCINOMA), Leslie grade 2  -Atypical ductal hyperplasia  -Invasive carcinoma is estrogen receptor positive, progesterone receptor positive and HER2 negative by  immunohistochemistry (see biomarker reporting template below)  -HER2 FISH results will be reported separately by cytogenetics  -See comment  B. RIGHT breast, 11:00, 6 cm from nipple, ultrasound guided core biopsy:  -Nodular sclerosing adenosis, columnar cell change, and fibrocystic change (including microcysts)  -Luminal calcifications in benign acini (sclerosing adenosis)  -Negative for atypia or malignancy  Comment:   Specimen A: Invasive carcinoma involves multiple tissue cores, and is 4.5 mm in greatest dimension  in a single core. The Leslie grade is 2 (tubule formation 3 + nuclear pleomorphism 2 + mitotic activity 1 = Leslie score 6) There are up to 2 mitotic figures in 10 high power fields (field diameter 0.5 mm).    Records Location: See Bookmarked material     Records Needed: Breast imaging for past 5 years 2022, 2020 and 2019 available currently     Writer received referral, reviewed for appropriate plan, then telephoned and spoke with Elisha and call transferred to New Patient Scheduling for completion. Emily Morrison RN

## 2022-03-31 NOTE — TELEPHONE ENCOUNTER
Spoke to Elisha about her new diagnosis of Invasive Ductal Carcinoma found in one of the areas in her right breast that was biopsied earlier this week; the other area was benign.  We discussed the Radiologist's recommendation of surgical and oncology consultation.  A referral has been placed and someone from their office will reaching out to help coordinate the appointments.  Elisha verbalized understanding and all questions and concerns were answered at this time.

## 2022-04-01 LAB — INTERPRETATION: NORMAL

## 2022-04-01 NOTE — TELEPHONE ENCOUNTER
RECORDS STATUS - BREAST    RECORDS REQUESTED FROM: Epic   DATE REQUESTED: 4/1   NOTES DETAILS STATUS   OFFICE NOTE from referring provider [facility] Dr. Pizarro: 3/2/22   OPERATIVE REPORT Epic 3/28/22: Breast Bx   MEDICATION LIST [facility] 3/2/22   LABS     PATHOLOGY REPORTS  (Tissue diagnosis, Stage, ER/RI percentage positive and intensity of staining, HER2 IHC, FISH, and all biopsies from breast and any distant metastasis)                 [facility] 3/28/22: HER 2 JODI FISH/SURG PATH   IMAGING (NEED IMAGES & REPORT)     MAMMO PACS     12/18/17: [facility]    7/30/15, 6/3/14, 4/1/13: [facility]   ULTRASOUND PACS Epic

## 2022-04-01 NOTE — PROGRESS NOTES
Oncology Consultation:  Date on this visit: 4/4/2022    Elisha Gao is referred by Dr.Mary Pizarro for an oncology consultation. She requires evaluation for new diagnosis of right breast cancer.    Primary Physician: Debi Pizarro     History Of Present Illness:  Ms. Gao is a 66 year old female with right breast cancer.  Routine screening mammogram on 3/17/2022 showed an asymmetry in the upper outer right breast.  Diagnostic breast imaging confirmed a 4 mm spiculated mass at 11:00, 4 cm from the nipple of the right breast.  Biopsy was consistent with a grade 2 invasive ductal carcinoma, ER strong in 98%, KS moderate in 40%, and HER2 negative by FISH and atypical ductal hyperplasia.      Ms. Gao denies prior history of breast issues or biopsies.  She denies palpable breast mass.  She reports significant pain with local anesthetic for her breast biopsy and significant bruising of the breast after.  She underwent menarche at 13 yo.  No history of pregnancies.  She had a hysterectomy and BSO at age 36 years old for endometriosis.  She was then on oral hormone replacement therapy for 10 years, stopped in 1997.  She has a h/o depression treated with Wellbutrin.  She reports mood is currently stable.    She is generally in good health.  She denies current cough, shortness of breath, or chest pain.  She has no abdominal complaints.  She denies skin rashes or lesions.  She has no current bone or joint aches or pains.  She is very active golfing, camping, horseback riding etc.  She recently broke her collarbone after falling off of a horse.  She states it has healed well and denies current pain.  The remainder of a complete 12 point review of systems was reviewed with the patient and was negative with the exception of that mentioned above.    Past Medical/Surgical History:  Past Medical History:   Diagnosis Date     Chronic low back pain     for a couple years, worse at night, not evaluated     Depression       "Osteopenia     FRAX  11/2%      Colonoscopy in  w/o polyps, next due in .    Past Surgical History:   Procedure Laterality Date     HYSTERECTOMY      endometriosis, ovarian cysts, hormones x 10  years     LASIK Bilateral      Allergies:  Allergies as of 2022 - Reviewed 2022   Allergen Reaction Noted     Alendronic acid Nausea and Vomiting 2010     Raloxifene Nausea 2010     Risedronate Nausea and Vomiting 2010     Current Medications:  Current Outpatient Medications   Medication Sig Dispense Refill     buPROPion (WELLBUTRIN XL) 300 MG 24 hr tablet Take 1 tablet (300 mg) by mouth every morning For additional refills, please schedule annual appointment at [phone] 90 tablet 3     citalopram (CELEXA) 40 MG tablet Take 1 tablet (40 mg) by mouth daily 90 tablet 3     magnesium 250 MG tablet Take 1 tablet by mouth daily       Vitamin D, Cholecalciferol, 25 MCG (1000 UT) TABS Take 3 tablets by mouth daily        Family History:  Mother with a h/o colon cancer.  Mother also with a h/o lung cancer.  She denies a family history of breast or gynecologic malignancies.    Social History:   to wife, Prema.  No children; had a hysterectomy at 37 yo for endometriosis.  Patient is retired from working as a /NICU nurse.  She is active golfing, horseback riding, and camping.  She smoked a 1/2 ppd x 20 years, quit smoking in .  Has approximately 5 beers per week.    Physical Exam:  BP (!) 155/95 (BP Location: Right arm, Patient Position: Sitting, Cuff Size: Adult Regular)   Pulse 69   Temp 97.9  F (36.6  C) (Oral)   Ht 1.63 m (5' 4.17\")   Wt 60.9 kg (134 lb 3.2 oz)   SpO2 97%   BMI 22.91 kg/m    General:  Well appearing adult female in NAD.  Alert and oriented x 3.  HEENT:  Normocephalic.  Sclera anicteric.  MMM.  No lesions of the oropharynx.  Lymph:  No palpable cervical, supraclavicular, or axillary LAD.  Chest:  CTA bilaterally.  No wheezes or crackles.  CV:  " RRR.  Nl S1 and S2.    Breast:  Bilateral breasts are of increased fibroglandular density.  At 11:00, 4 cm from the nipple of the right breast is a palpable subcentimeter mass.  There is significant ecchymosis of the breast inferior and lateral to this.  There are no other discretely palpable masses in either breast.  Right nipple is flat.  Left nipple is everted.  No nipple discharge.  Abd:  Soft/ND.    Ext:  No pitting edema of the bilateral lower extremities.    Musculo:  Full ROM of the bilateral upper extremities.  Neuro:  Cranial nerves grossly intact.  Psych:  Mood and affect appear normal.  Skin:  No visible concerning skin rashes or lesions.    Laboratory/Imaging Studies  3/17/2022 Bilateral screening mammograms:  Possible asymmetry in the right breast, 10 o'clock, middle depth, approximately 5 cm from the nipple.    3/24/2022 Right breast diagnostic mammogram and ultrasound:  4 mm spiculated mass at 11:00, 4 cm from the nipple        3/28/2022 Contrast enhanced mammogram:  The suspicious mass in the right breast enhances and measures 7 mm.  In the right breast 11:00 posterior depth is a 4 mm enhancing focus.    3/28/2022 right breast biopsy, 11:00, 4 cm from the nipple:  - grade 2 invasive ductal carcinoma  - invasive carcinoma is ER strong 98%, NE moderate 40%  - HER2 negative by FISH  - atypical ductal hyperplasia    3/28/2022 right breast biopsy, 11:00, 6 cm from the nipple:  - nodular sclerosing adenosis  - negative for atypia or malignancy    ASSESSMENT/PLAN:  65 yo female with clinical stage Ia, [id number], grade 2, ER/NE positive, HER-2 negative invasive ductal carcinoma of the right breast.  Today I discussed the diagnosis, staging, and treatment options with the patient and her wife, Prema.  We reviewed her imaging demonstrates an approximate 7 mm mass in the right breast.  There is no clinical evidence of lymphadenopathy.  We reviewed the grade and receptor status of her breast cancer, which is  grade 2 and ER/VT positive and HER2 negative respectively.  Taken altogether this is a stage Ia, or early stage, breast cancer.  The goal of treatment is curative.    We then discussed treatment options including surgery, chemotherapy, radiation, and endocrine therapy.  We first reviewed the roles of surgery and radiation in preventing local recurrence of breast cancer.  Ms. Gao met with Dr. Garcia this morning and options for surgical excision including lumpectomy and mastectomy were discussed.  Ms. Gao is electing to proceed with lumpectomy excision.  We reviewed her lymph nodes will be staged with a sentinel lymph node biopsy at the time of surgery.  She is scheduled for lumpectomy and SLN biopsy on 4/11/2022.  We discussed following surgery, plan will be to proceed with adjuvant radiation.      We then discussed the roles of systemic therapies such as chemotherapy and endocrine therapy in treating micrometastatic disease and preventing distant recurrence of breast cancer.  If her final tumor size is < 5 mm, there will be no indication for adjuvant chemotherapy.  If her final tumor size is > 5 mm, I would recommend sending Oncotype DX in order to determine chemotherapy benefit.  Oncotype DX evaluates expression levels of 21 genes in the breast cancer and correlates expression levels with a recurrence score.  Patients with a recurrence score of 26 or >, generally benefit from chemotherapy; whereas those with a recurrence score of 25 or less, do not.  We discussed if Oncotype suggests benefit to chemotherapy, this is typically administered after surgery and before radiation.      Upon completion of her other cancer therapies, plan will be to treat with a minimum 5 year course of endocrine therapy.  My recommendation is for treatment with anastrozole.  We reviewed the potential side effects of anastrozole including myalgias, arthralgias, hot flashes, vaginal dryness, mood disorder, and thinning of the bones.       We reviewed the results of the DEXA bone density scan she had performed on 3/17/2022, which showed a lowest T-score of -2.0 and is consistent with a diagnosis of osteopenia.  I recommend that she take vitamin D 1000 IUs daily and obtain 20-30 minutes of daily weight bearing activity.  In addition, upon starting endocrine therapy, I also recommend treatment with Zometa.  Zometa 4 mg IV once every 6 months for 2-3 years has been shown to prevent bone loss on aromatase inhibitor therapy and also has been shown to reduce the risk of breast cancer bone metastasis in postmenopausal women with a h/o ER positive breast cancer.  She has an upcoming appointment with her dentist and will obtain clearance for Zometa.  We will plan to monitor a DEXA bone density scan once every 2 years while on anastrozole.    In regards to her personal history of breast cancer and family history of colon cancer, I agree with referral for genetic counseling and testing.  Dr. Garcia consented patient for Breast Actionable panel which was drawn earlier today, and has also placed a Cancer Risk Management referral.    Ms. Gao and her spouse had multiple questions which I answered today.  Plan is to proceed with surgery on 4/11.  I will see her back in clinic approximately 2-3 weeks later.  I spent a total of 70 minutes spent on the date of the encounter doing chart review, review of test results, interpretation of tests, patient visit, documentation and discussion with other provider(s).

## 2022-04-04 ENCOUNTER — ONCOLOGY VISIT (OUTPATIENT)
Dept: ONCOLOGY | Facility: CLINIC | Age: 67
End: 2022-04-04
Attending: INTERNAL MEDICINE
Payer: MEDICARE

## 2022-04-04 ENCOUNTER — PATIENT OUTREACH (OUTPATIENT)
Dept: SURGERY | Facility: CLINIC | Age: 67
End: 2022-04-04
Payer: COMMERCIAL

## 2022-04-04 ENCOUNTER — PRE VISIT (OUTPATIENT)
Dept: ONCOLOGY | Facility: CLINIC | Age: 67
End: 2022-04-04
Payer: COMMERCIAL

## 2022-04-04 VITALS
BODY MASS INDEX: 22.91 KG/M2 | DIASTOLIC BLOOD PRESSURE: 95 MMHG | TEMPERATURE: 97.9 F | OXYGEN SATURATION: 97 % | HEART RATE: 69 BPM | WEIGHT: 134.2 LBS | HEIGHT: 64 IN | SYSTOLIC BLOOD PRESSURE: 155 MMHG

## 2022-04-04 VITALS
DIASTOLIC BLOOD PRESSURE: 95 MMHG | TEMPERATURE: 97.9 F | WEIGHT: 134.2 LBS | OXYGEN SATURATION: 97 % | SYSTOLIC BLOOD PRESSURE: 155 MMHG | BODY MASS INDEX: 22.91 KG/M2 | HEIGHT: 64 IN | HEART RATE: 69 BPM

## 2022-04-04 DIAGNOSIS — C50.411 MALIGNANT NEOPLASM OF UPPER-OUTER QUADRANT OF RIGHT BREAST IN FEMALE, ESTROGEN RECEPTOR POSITIVE (H): ICD-10-CM

## 2022-04-04 DIAGNOSIS — M85.89 OSTEOPENIA OF MULTIPLE SITES: ICD-10-CM

## 2022-04-04 DIAGNOSIS — C50.911 INVASIVE DUCTAL CARCINOMA OF BREAST, FEMALE, RIGHT (H): ICD-10-CM

## 2022-04-04 DIAGNOSIS — Z11.59 ENCOUNTER FOR SCREENING FOR OTHER VIRAL DISEASES: Primary | ICD-10-CM

## 2022-04-04 DIAGNOSIS — Z17.0 MALIGNANT NEOPLASM OF UPPER-OUTER QUADRANT OF RIGHT BREAST IN FEMALE, ESTROGEN RECEPTOR POSITIVE (H): ICD-10-CM

## 2022-04-04 LAB
INTERPRETATION: NORMAL
SIGNIFICANT RESULTS: NORMAL
SIGNIFICANT RESULTS: NORMAL
SPECIMEN DESCRIPTION: NORMAL
SPECIMEN DESCRIPTION: NORMAL
TEST DETAILS, MDL: NORMAL
TEST DETAILS, MDL: NORMAL

## 2022-04-04 PROCEDURE — G0463 HOSPITAL OUTPT CLINIC VISIT: HCPCS

## 2022-04-04 PROCEDURE — 36415 COLL VENOUS BLD VENIPUNCTURE: CPT | Performed by: SURGERY

## 2022-04-04 PROCEDURE — 99205 OFFICE O/P NEW HI 60 MIN: CPT | Performed by: INTERNAL MEDICINE

## 2022-04-04 PROCEDURE — 99204 OFFICE O/P NEW MOD 45 MIN: CPT | Performed by: SURGERY

## 2022-04-04 NOTE — LETTER
4/4/2022         RE: Elisha Gao  [address]  [facility] [address]        Dear Colleague,    Thank you for referring your patient, Elisha Gao, to the [facility]. Please see a copy of my visit note below.    HISTORY OF PRESENT ILLNESS:  Elisha Gao is a 66-year-old woman I was asked to see at the request of Dr. Debi Pizarro for evaluation of a new right breast cancer.      The patient underwent a screening mammogram on 03/17/2022 which demonstrated developing asymmetry in the right breast.  She underwent a diagnostic mammogram, which demonstrated a 4 mm spiculated mass at that location.  She underwent a contrast-enhanced mammography, which showed the cancer was 7 mm in size and she had a second enhancing lesion as well.  She underwent a needle biopsy of both lesions.  The one lesion was a sclerosing adenosis with no concern of cancer.  The other lesion was a grade 2 invasive ductal cancer, ER positive, OK positive, HER2/christina negative.      She is now here to talk about treatment options.  She cannot feel the breast lump.  She denies any symptoms of lymphadenopathy.  She has had no prior history of any breast problems.    PAST MEDICAL HISTORY:  Total abdominal hysterectomy, but no other major medical problems.    FAMILY HISTORY:  Negative for breast cancer.    PHYSICAL EXAMINATION:  Physical examination was not performed.    IMPRESSION:  T1 N0 M0, ER-positive breast cancer.    PLAN:  I talked to her about the various treatment options including mastectomy with or without reconstruction versus a lumpectomy and radiation.  She desires breast-conserving surgery.  I did recommend a sentinel lymph node biopsy.  I told her that endocrine therapy would be recommended.  I told her the decision regarding chemotherapy would not be made after her surgery.  She understands and wishes to proceed.  I did talk to her about the role of genetic testing.  She did want to undergo  genetic testing.  We will schedule that today.  All of her questions were asked and answered.      Total time 45 minutes, which included reviewing her imaging, in-person visit and coordinating her care.        Again, thank you for allowing me to participate in the care of your patient.      Sincerely,    Santy Garcia MD

## 2022-04-04 NOTE — PATIENT INSTRUCTIONS
Surgical Oncology RN Care Coordination Note:     - Genetic testing today.     - Pre op appointment on 4/7/2022 at 8 am - Video visit.     - Surgery is scheduled for 4/11/2022, the pre op Clinic will provide you with the check in time and information for day of surgery.     - The [facility] Center will call you to schedule the tag localizer placement for this week.       Katelin Okeefe, RN, BSN  Care Coordinator

## 2022-04-04 NOTE — NURSING NOTE
"Oncology Rooming Note    April 4, 2022 9:45 AM   Elisha Gao is a 66 year old female who presents for:    Chief Complaint   Patient presents with     Oncology Clinic Visit     Invasive ductal carcinoma of breast     Initial Vitals: BP (!) 155/95 (BP Location: Right arm, Patient Position: Sitting, Cuff Size: Adult Regular)   Pulse 69   Temp 97.9  F (36.6  C) (Oral)   Ht 1.63 m (5' 4.17\")   Wt 60.9 kg (134 lb 3.2 oz)   SpO2 97%   BMI 22.91 kg/m   Estimated body mass index is 22.91 kg/m  as calculated from the following:    Height as of this encounter: 1.63 m (5' 4.17\").    Weight as of this encounter: 60.9 kg (134 lb 3.2 oz). Body surface area is 1.66 meters squared.  Data Unavailable Comment: Data Unavailable   No LMP recorded. Patient is postmenopausal.   Allergies reviewed: Yes  Medications reviewed: Yes    Medications: Medication refills not needed today.  Pharmacy name entered into [facility]: [facility] - [address]    Clinical concerns: None.      Misha Hinojosa CMA            "

## 2022-04-04 NOTE — NURSING NOTE
Surgical Oncology RN Care Coordination Note:     Surgery teaching complete, surgery packet provided to patient. All surgery related questions answered, patient is scheduled for surgery and breast center to contact patient to schedule the tag localizer this week.     Katelin Okeefe RN, BSN  Care Coordinator

## 2022-04-04 NOTE — TELEPHONE ENCOUNTER
FUTURE VISIT INFORMATION      SURGERY INFORMATION:    Date: 22    Location: [facility]    Surgeon:  Santy Garcia MD    Anesthesia Type:  general    Procedure: LUMPECTOMY, BREAST, WITH SENTINEL LYMPH NODE BIOPSY    Consult: ov     RECORDS REQUESTED FROM:        Primary Care Provider: Debi Pizarro MD  - [facility]    Most recent EKG+ Tracin11

## 2022-04-04 NOTE — NURSING NOTE
"Oncology Rooming Note    April 4, 2022 9:36 AM   Elisha Gao is a 66 year old female who presents for:    Chief Complaint   Patient presents with     Oncology Clinic Visit     Invasive ductal carcinoma of breast.     Initial Vitals: BP (!) 155/95 (BP Location: Right arm, Patient Position: Sitting, Cuff Size: Adult Regular)   Pulse 69   Temp 97.9  F (36.6  C) (Oral)   Ht 1.63 m (5' 4.17\")   Wt 60.9 kg (134 lb 3.2 oz)   SpO2 97%   BMI 22.91 kg/m   Estimated body mass index is 22.91 kg/m  as calculated from the following:    Height as of this encounter: 1.63 m (5' 4.17\").    Weight as of this encounter: 60.9 kg (134 lb 3.2 oz). Body surface area is 1.66 meters squared.  Data Unavailable Comment: Data Unavailable   No LMP recorded. Patient is postmenopausal.  Allergies reviewed: Yes  Medications reviewed: Yes    Medications: Medication refills not needed today.  Pharmacy name entered into [facility]: [facility] - [address]    Clinical concerns: None.      Misha Hinojosa CMA            "

## 2022-04-04 NOTE — NURSING NOTE
Venipuncture done in left AC space in clinic, pt tolerated. Specimen sent to lab, see flowsheet for additional details.  Anahi Martinez CMA on 4/4/2022 at 10:30 AM

## 2022-04-04 NOTE — LETTER
April 4, 2022      TO: Elisha Gao  [address]  [facility] [address]         Dear Elisha,    SURGERY PACKET         DATE OF SURGERY:   4/11/2022        LOCATION:      [facility] [address] [address]  [phone]                SURGEON:   Dr. Santy Garcia            TIME:   You will receive the finalized surgery time during your pre-operative physical with PAC.         If you have questions regarding insurance authorizations or financial questions in general, please call [phone]. You can also ask your insurance company directly. We recommend waiting at least 72 hours before calling our financial securing team. This will give them enough time to start the securing process.    Fax any forms that need completion to: [phone] or they can be emailed to [email or web address] with 'FORMS - PROVIDER NAME' in the subject line.     Sincerely,     Elisha Love RN Care Coordinator   [phone]    If you are deaf or hard of hearing, please let us know.  We provide many free services including sign language interpreters, oval interpreters, TTYs, telephone amplifiers, note takers, and written materials.    APPOINTMENTS    NOTE: Please remember to have a pre-operative history and physical completed within 30 days of your surgery. If you do not have one of these completed and in our system, your surgery will be subject to cancellation. If you cannot get an appointment with your doctor to complete this, please reach out to us and we will schedule you with our PAC department. If your history and physical is out of the [employer] system, please have them fax your completed pre-op note to: [phone]    Pre-operative physical appointment: Video visit  appointment with Pre-operative Assessment Center (PAC): 4/7/2022 at 8:00 AM  [facility] [place] [facility] - 5th Floor  [address] [place] [id number]      Pre-operative COVID-19 Test: 4/7/2022 at  11:45 AM  [facility] [place] 1st floor        Post-operative appointment: Clinic appointment with  Dr. Santy Garcia 4/29/2022 at 10:00 AM  [facility] [place] 2nd [place]  [address] [place] [address]       Before Your Surgery: Testing for COVID-19 (Coronavirus)  Thank you for choosing [facility] for your health care needs. The COVID-19 pandemic is a very challenging time for everyone.   Our goal is to keep you and our team here at [facility] safe and healthy. We've taken many steps to make this happen.   Before you come in    If you test COVID-19 positive with any kind of test before your surgery date, call your surgeon's office. We need to know the date of your positive test. We may need to re-schedule your surgery.    Unless you've had a positive COVID-19 test within the past 90 days, you must get tested for COVID-19 even if you've been vaccinated. Your test needs to happen 2 to 4 days before you check in to the hospital or surgery site.     After the test, please stay at home and out of contact with other people. This will help prevent possible COVID-19 exposure before your treatment. Please follow all current safety guidelines, including:    Limit trips outside your home, limit the number of people you see, and use social distancing.    Always wear a mask outside your home and wash your hands often.  If your test shows you have COVID-19    If your test is positive, you will be notified. A positive test means that you have the virus. We'll probably have to postpone your admission, surgery or procedure. We'll let you know what to do and when you can re-schedule.     We may need to cancel your treatment on short notice for other reasons, too.  If your test shows you DON'T have COVID-19    Even if your test is negative, you can still get COVID-19. It's rare but, sometimes, the test result is wrong. You could also catch the virus after taking the test. There's a very small chance that you could catch COVID-19 in  the hospital or surgery center. [facility] has taken many steps to prevent this from happening.     Day of your surgery or procedure  1. Please come wearing a face covering that covers both your nose and mouth.  2. When you arrive, we'll ask you some questions to find out if you've had any exposures to COVID-19, or have any signs of COVID-19.  3. Please verify the current visitor guidelines with the Pre-operative Assessment Center (PAC) during your pre-operative physical appointment. All visitors must also wear face coverings and will be screened for exposure to, or signs of, COVID-19.  1. Even if no visitors are allowed, you can still have with you:    Your legal guardian or legal decision maker    Someone to help you, if you are disabled    A parent and one other visitor, if you are younger than 18 years old  2. We might need to teach you about taking care of yourself after surgery. If so, a visitor can come into the hospital to learn about it, too.  3. The rules for visitors change often, depending on how much the virus is spreading.    Possible surgery delay  Like you, we want your surgery to happen when it's scheduled. But sometimes the hospital is so full that it's not safe for you to have your surgery. This is especially true during the pandemic. Your surgery may need to be re-scheduled at a later date. If this happens, we will call and tell you.    Questions and answers  Q: Does it matter where I get tested for COVID-19?  A: Yes. We urge you to get tested at one of our [facility] COVID-19 testing sites. These tests will be either RT-PCR or NAAT, and are accepted. We process these tests in our lab and can get the results quickly. Your [facility] care team needs to get your results before you check in.  Q: What should I do if I can't get tested at [facility]?  A: You can get tested somewhere else, but you'll need to take these extra steps:   1. Contact your family doctor or clinic  "to arrange your test.  2. Take the test within 4 days of your surgery or procedure. We can't accept tests older than 4 days.  3. Be sure you get an RT-PCR or an NAAT (Nucleic Acid Amplification Test) test.    Do NOT get a \"rapid antigen\" test. Negative results from \"rapid antigen\" tests are NOT accepted before your surgery.  4. Make sure your doctor or clinic faxes your results to [facility] at [phone]. Or take a photo of the results and upload it into [employer].  5. If we don't get your results in time, we may have to delay or cancel your treatment.    Preparing for Your Surgery  Within 30 Days of Surgery :    Complete the pre-operative physical that was scheduled for you with our Pre-Operative Assessment Center (PAC).   Please be ready to share:    Your doctor's clinic name and phone number    Your medical, surgical and anesthesia history    A list of allergies and sensitivities    A list of medicines, including herbal treatments and over-the-counter drugs    If you need to stop any medicines before surgery, ask when to start taking them again. We do this for your safety. Many medicines can make you bleed too much during surgery. Some change how well surgery (anesthesia) drugs work.    If applicable - Please tell us if you're pregnant--or if there's any chance you might be pregnant. Some surgeries may injure a fetus (unborn baby), so they require a pregnancy test.     Complete a pre-operative physical exam with your family doctor or partner. If you cannot get an appointment with your doctor to complete this, please reach out to us. If your clinic is not part of the [employer] system, please ask the doctor to send all of your results to the hospital before the surgery.  The pre-op physical can be faxed to: [phone].  At your pre-operative physical:    Talk to your care team about all medicines you take. If you need to stop any medicines before surgery, ask when to start taking them again. We " do this for your safety. Many medicines can make you bleed too much during surgery. Some change how well surgery (anesthesia) drugs work.    Call your insurance company to let them know you're having surgery. Please wait at least 72 hours after surgery is scheduled before calling your insurance. (If you don't have insurance, call [phone].)    Call your clinic if there's any change in your health. This includes signs of a cold or flu (sore throat, runny nose, cough, rash, fever). It also includes a scrape or scratch near the surgery site.    Within 7 Days of Surgery:    Pre-register with the hospital/surgery center. Please use the hospital's phone number, [phone], or, to register online, go to [email or web address]. Additionally, someone from the Registration Department will also contact you for pre-registration ([phone]).    Please keep in mind that the time of surgery is subject to change up until the day before. Please follow the arrival time provided by the Pre-operative Assessment Center (PAC) during your pre-operative physical appointment.    A pre-admission nurse will call you within 1 to 2 days of your surgery to go over these and other instructions. If you do not hear from them by 4:30 PM on the day before surgery, you may call them at [phone].  Please be ready to share:    Your doctor's clinic name and phone number    Your medical, surgical and anesthesia history    A list of allergies and sensitivities    A list of medicines, including herbal treatments and over-the-counter drugs    Whether the patient has a legal guardian (ask how to send us the papers in advance)    If applicable - Please tell us if you're pregnant--or if there's any chance you might be pregnant. Some surgeries may injure a fetus (unborn baby), so they require a pregnancy test.     If you need someone to review your surgical instructions one more time, please call our pre-admission nursing team at [phone].      Eating and drinking guidelines:  For your safety: Unless your surgeon tells you otherwise, follow the general guidelines below.   These instructions will be provided to you by the Pre-operative Assessment Center (PAC) during your pre-operative physical appointment. Follow the exact times provided to you by the Pre-operative Assessment Center (PAC) during your pre-operative physical appointment.    Eat and drink as usual until 8 hours before surgery. After that, no food or milk.    Drink clear liquids until 2 hours before surgery. These are liquids you can see through, like water, Gatorade and Propel Water. You may also have black coffee and tea (no cream or milk).    Nothing by mouth within 2 hours of surgery. This includes gum, candy and breath mints.    If you drink alcohol: Stop drinking it at least 1 week before surgery.    If your care team tells you to take medicine on the morning of surgery, it's okay to take it with a sip of water.    Preventing infection:  1. Shower or bathe the night before and morning of your surgery. Follow the instructions provided.   2. Don't shave or clip hair near your surgery site. We'll remove the hair if needed.  3. Don't smoke or vape the morning of surgery.   ? Note: Some surgeries require you to completely quit smoking and nicotine. Check with your surgeon.  4. Your care team will make every effort to keep you safe from infection. We will:  ? Clean our hands often with soap and water (or an alcohol-based hand rub).  ? Clean the skin at your surgery site with a special soap that kills germs.  ? Give you a special gown to keep you warm. (Cold raises the risk of infection.)  ? Wear special hair covers, masks, gowns and gloves during surgery.  ? Give antibiotic medicine, if prescribed. Not all surgeries need antibiotics.    What to bring on the day of surgery:  1. Photo ID and insurance card  2. Copy of your health care directive, if you have one  3. Glasses and hearing aides  (bring cases), you can't wear contacts during surgery   4. Inhaler, eye drops, CPAP machine or breathing device - if you use them (tell us about these when you arrive)  5. A few personal items, if spending the night.   6. If you have . . .  ? A pacemaker, ICD (cardiac defibrillator) or other implant: Bring the ID card.  ? An implanted stimulator: Bring the remote control.  ? A legal guardian: Bring a copy of the certified (court-stamped) guardianship papers.  Please remove any jewelry, including body piercings. Leave jewelry and other valuables at home.    After surgery:    If it's hard to control your pain or you need more pain medicine, please call your surgeon's office (please call the clinic, not the ).  If you're going home the day of surgery:    You must have a responsible adult drive you home. They should stay with you overnight as well.    If you don't have someone to stay with you, and you aren't safe to go home alone, we may keep you overnight. Insurance often won't pay for this.      Showering Before Surgery  Your surgeon has asked you to take 2 showers before surgery.  Why is this important?  It is normal for bacteria (germs) to be on your skin. The skin protects us from these germs. When you have surgery, we cut the skin. Sometimes germs get into the cuts and cause infection (illness caused by germs). By following the instructions below and using special soap, you will lower the number of germs on your skin. This decreases your chance of infection.  Soap for surgery:  Buy or get 8 ounces of antiseptic surgical soap called 4% CHG. Common name brands of this soap are Hibiclens and Exidine.  You can find it at your local pharmacy, clinic or retail store. If you have trouble, ask your pharmacist to help you find the right substitute.  A note about shaving: Do not shave within 12 inches of your incision (surgical cut) area for at least 3 days before surgery. Shaving can make small cuts in the skin.  This puts you at a higher risk of infection.  Items you will need for each shower:    1 newly washed towel    4 ounces of one of the above soaps    Clean pajamas or clothes to change into  FOLLOW THESE SHOWERING INSTRUCTIONS:  Follow these steps the evening before surgery and the morning of surgery.  1. Wash your hair and body with your regular shampoo and soap. Make sure you rinse the shampoo and soap from your hair and body.  2. Using clean hands, apply about 2 ounces of surgical soap gently on your skin from your ear lobes to your toes. Use on your groin area last. Do not use this soap on your face or head. If you get any soap in your eyes, ears or mouth, rinse right away.  3. Repeat step 2. It is very important to let the soap stay on your skin for at least 1 minute.  4. Rinse well and dry off using a clean towel.  If you feel any tingling, itching or other irritation, rinse right away. It is normal to feel some coolness on the skin after using the antiseptic soap. Your skin may feel a bit dry after the shower, but do not use any lotions, creams or moisturizers. Do not use hair spray or other products in your hair.  5. Dress in freshly washed clothes or pajamas. Use fresh pillowcases and sheets on your bed.  Repeat these steps the morning of surgery. If you have any questions about showering or an allergy to CHG soap, please call your surgery center.            For informational purposes only. Not to replace the advice of your health care provider. Copyright   2012 [employer]. All rights reserved. Clinically reviewed by Infection Prevention and Practice and Education. [employer] 792510 - REV 12/20.  Copyright   2020 [employer]. All rights reserved. Clinically reviewed by Infection Prevention and [facility] COVID-19 Clinical Team. [employer] 254086 - Rev 02/01/22. Copyright   2003, 2019 [employer]. All rights reserved. Clinically reviewed by Ivette King MD.  [employer] [id number] - REV 07/21.

## 2022-04-04 NOTE — LETTER
4/4/2022     RE: Elisha Gao  [address] [facility] [address]    Dear Colleague,    Thank you for referring your patient, Elisha Gao, to the [facility]. Please see a copy of my visit note below.    Oncology Consultation:  Date on this visit: 4/4/2022    Elisha Gao is referred by Dr.Mary Pizarro for an oncology consultation. She requires evaluation for new diagnosis of right breast cancer.    Primary Physician: Debi Pizarro     History Of Present Illness:  Ms. Gao is a 66 year old female with right breast cancer.  Routine screening mammogram on 3/17/2022 showed an asymmetry in the upper outer right breast.  Diagnostic breast imaging confirmed a 4 mm spiculated mass at 11:00, 4 cm from the nipple of the right breast.  Biopsy was consistent with a grade 2 invasive ductal carcinoma, ER strong in 98%, MN moderate in 40%, and HER2 negative by FISH and atypical ductal hyperplasia.      Ms. Gao denies prior history of breast issues or biopsies.  She denies palpable breast mass.  She reports significant pain with local anesthetic for her breast biopsy and significant bruising of the breast after.  She underwent menarche at 11 yo.  No history of pregnancies.  She had a hysterectomy and BSO at age 36 years old for endometriosis.  She was then on oral hormone replacement therapy for 10 years, stopped in 1997.  She has a h/o depression treated with Wellbutrin.  She reports mood is currently stable.    She is generally in good health.  She denies current cough, shortness of breath, or chest pain.  She has no abdominal complaints.  She denies skin rashes or lesions.  She has no current bone or joint aches or pains.  She is very active golfing, camping, horseback riding etc.  She recently broke her collarbone after falling off of a horse.  She states it has healed well and denies current pain.  The remainder of a complete 12 point review of systems was reviewed with the  "patient and was negative with the exception of that mentioned above.    Past Medical/Surgical History:  Past Medical History:   Diagnosis Date     Chronic low back pain     for a couple years, worse at night, not evaluated     Depression      Osteopenia     FRAX  11/2%      Colonoscopy in  w/o polyps, next due in .    Past Surgical History:   Procedure Laterality Date     HYSTERECTOMY      endometriosis, ovarian cysts, hormones x 10  years     LASIK Bilateral      Allergies:  Allergies as of 2022 - Reviewed 2022   Allergen Reaction Noted     Alendronic acid Nausea and Vomiting 2010     Raloxifene Nausea 2010     Risedronate Nausea and Vomiting 2010     Current Medications:  Current Outpatient Medications   Medication Sig Dispense Refill     buPROPion (WELLBUTRIN XL) 300 MG 24 hr tablet Take 1 tablet (300 mg) by mouth every morning For additional refills, please schedule annual appointment at [phone] 90 tablet 3     citalopram (CELEXA) 40 MG tablet Take 1 tablet (40 mg) by mouth daily 90 tablet 3     magnesium 250 MG tablet Take 1 tablet by mouth daily       Vitamin D, Cholecalciferol, 25 MCG (1000 UT) TABS Take 3 tablets by mouth daily        Family History:  Mother with a h/o colon cancer.  Mother also with a h/o lung cancer.  She denies a family history of breast or gynecologic malignancies.    Social History:   to wife, Prema.  No children; had a hysterectomy at 35 yo for endometriosis.  Patient is retired from working as a /NICU nurse.  She is active golfing, horseback riding, and camping.  She smoked a 1/2 ppd x 20 years, quit smoking in .  Has approximately 5 beers per week.    Physical Exam:  BP (!) 155/95 (BP Location: Right arm, Patient Position: Sitting, Cuff Size: Adult Regular)   Pulse 69   Temp 97.9  F (36.6  C) (Oral)   Ht 1.63 m (5' 4.17\")   Wt 60.9 kg (134 lb 3.2 oz)   SpO2 97%   BMI 22.91 kg/m    General:  Well appearing " adult female in NAD.  Alert and oriented x 3.  HEENT:  Normocephalic.  Sclera anicteric.  MMM.  No lesions of the oropharynx.  Lymph:  No palpable cervical, supraclavicular, or axillary LAD.  Chest:  CTA bilaterally.  No wheezes or crackles.  CV:  RRR.  Nl S1 and S2.    Breast:  Bilateral breasts are of increased fibroglandular density.  At 11:00, 4 cm from the nipple of the right breast is a palpable subcentimeter mass.  There is significant ecchymosis of the breast inferior and lateral to this.  There are no other discretely palpable masses in either breast.  Right nipple is flat.  Left nipple is everted.  No nipple discharge.  Abd:  Soft/ND.    Ext:  No pitting edema of the bilateral lower extremities.    Musculo:  Full ROM of the bilateral upper extremities.  Neuro:  Cranial nerves grossly intact.  Psych:  Mood and affect appear normal.  Skin:  No visible concerning skin rashes or lesions.    Laboratory/Imaging Studies  3/17/2022 Bilateral screening mammograms:  Possible asymmetry in the right breast, 10 o'clock, middle depth, approximately 5 cm from the nipple.    3/24/2022 Right breast diagnostic mammogram and ultrasound:  4 mm spiculated mass at 11:00, 4 cm from the nipple        3/28/2022 Contrast enhanced mammogram:  The suspicious mass in the right breast enhances and measures 7 mm.  In the right breast 11:00 posterior depth is a 4 mm enhancing focus.    3/28/2022 right breast biopsy, 11:00, 4 cm from the nipple:  - grade 2 invasive ductal carcinoma  - invasive carcinoma is ER strong 98%, DE moderate 40%  - HER2 negative by FISH  - atypical ductal hyperplasia    3/28/2022 right breast biopsy, 11:00, 6 cm from the nipple:  - nodular sclerosing adenosis  - negative for atypia or malignancy    ASSESSMENT/PLAN:  65 yo female with clinical stage Ia, [id number], grade 2, ER/DE positive, HER-2 negative invasive ductal carcinoma of the right breast.  Today I discussed the diagnosis, staging, and treatment options  with the patient and her wife, Prema.  We reviewed her imaging demonstrates an approximate 7 mm mass in the right breast.  There is no clinical evidence of lymphadenopathy.  We reviewed the grade and receptor status of her breast cancer, which is grade 2 and ER/MN positive and HER2 negative respectively.  Taken altogether this is a stage Ia, or early stage, breast cancer.  The goal of treatment is curative.    We then discussed treatment options including surgery, chemotherapy, radiation, and endocrine therapy.  We first reviewed the roles of surgery and radiation in preventing local recurrence of breast cancer.  Ms. Gao met with Dr. Garcia this morning and options for surgical excision including lumpectomy and mastectomy were discussed.  Ms. Gao is electing to proceed with lumpectomy excision.  We reviewed her lymph nodes will be staged with a sentinel lymph node biopsy at the time of surgery.  She is scheduled for lumpectomy and SLN biopsy on 4/11/2022.  We discussed following surgery, plan will be to proceed with adjuvant radiation.      We then discussed the roles of systemic therapies such as chemotherapy and endocrine therapy in treating micrometastatic disease and preventing distant recurrence of breast cancer.  If her final tumor size is < 5 mm, there will be no indication for adjuvant chemotherapy.  If her final tumor size is > 5 mm, I would recommend sending Oncotype DX in order to determine chemotherapy benefit.  Oncotype DX evaluates expression levels of 21 genes in the breast cancer and correlates expression levels with a recurrence score.  Patients with a recurrence score of 26 or >, generally benefit from chemotherapy; whereas those with a recurrence score of 25 or less, do not.  We discussed if Oncotype suggests benefit to chemotherapy, this is typically administered after surgery and before radiation.      Upon completion of her other cancer therapies, plan will be to treat with a minimum 5 year  course of endocrine therapy.  My recommendation is for treatment with anastrozole.  We reviewed the potential side effects of anastrozole including myalgias, arthralgias, hot flashes, vaginal dryness, mood disorder, and thinning of the bones.      We reviewed the results of the DEXA bone density scan she had performed on 3/17/2022, which showed a lowest T-score of -2.0 and is consistent with a diagnosis of osteopenia.  I recommend that she take vitamin D 1000 IUs daily and obtain 20-30 minutes of daily weight bearing activity.  In addition, upon starting endocrine therapy, I also recommend treatment with Zometa.  Zometa 4 mg IV once every 6 months for 2-3 years has been shown to prevent bone loss on aromatase inhibitor therapy and also has been shown to reduce the risk of breast cancer bone metastasis in postmenopausal women with a h/o ER positive breast cancer.  She has an upcoming appointment with her dentist and will obtain clearance for Zometa.  We will plan to monitor a DEXA bone density scan once every 2 years while on anastrozole.    In regards to her personal history of breast cancer and family history of colon cancer, I agree with referral for genetic counseling and testing.  Dr. Garcia consented patient for Breast Actionable panel which was drawn earlier today, and has also placed a Cancer Risk Management referral.    Ms. Gao and her spouse had multiple questions which I answered today.  Plan is to proceed with surgery on 4/11.  I will see her back in clinic approximately 2-3 weeks later.  I spent a total of 70 minutes spent on the date of the encounter doing chart review, review of test results, interpretation of tests, patient visit, documentation and discussion with other provider(s).     Again, thank you for allowing me to participate in the care of your patient.      Sincerely,    Gardenia Kaplan MD

## 2022-04-04 NOTE — PROGRESS NOTES
RN Care Coordinator: Elisha Love RN     Surgery is scheduled with Dr. Santy Garcia on 4/11/2022 at the [facility].  Scheduled per case request orders.    H&P to be completed by PAC     COVID-19 test: 4/7/2022    Patient will complete COVID-19 test that was scheduled by surgical coordinator 2-4 days prior to surgery.     Post-op: will be scheduled by the clinic    Patient will receive surgery arrival and start time from PAC.      I spoke with the patient and was able to confirm the scheduled information. No further action needed at this time.    The surgery packet was provided by the RN during appointment      Nuclear Medicine to be delivered to OR for injection  and Tag Localization placement - prior to OR date      NM scheduled for deliver.     Breast Center to contact patient to localizer placement.

## 2022-04-04 NOTE — PROGRESS NOTES
HISTORY OF PRESENT ILLNESS:  Elisha Gao is a 66-year-old woman I was asked to see at the request of Dr. Debi Pizarro for evaluation of a new right breast cancer.      The patient underwent a screening mammogram on 03/17/2022 which demonstrated developing asymmetry in the right breast.  She underwent a diagnostic mammogram, which demonstrated a 4 mm spiculated mass at that location.  She underwent a contrast-enhanced mammography, which showed the cancer was 7 mm in size and she had a second enhancing lesion as well.  She underwent a needle biopsy of both lesions.  The one lesion was a sclerosing adenosis with no concern of cancer.  The other lesion was a grade 2 invasive ductal cancer, ER positive, IN positive, HER2/christina negative.      She is now here to talk about treatment options.  She cannot feel the breast lump.  She denies any symptoms of lymphadenopathy.  She has had no prior history of any breast problems.    PAST MEDICAL HISTORY:  Total abdominal hysterectomy, but no other major medical problems.    FAMILY HISTORY:  Negative for breast cancer.    PHYSICAL EXAMINATION:  Physical examination was not performed.    IMPRESSION:  T1 N0 M0, ER-positive breast cancer.    PLAN:  I talked to her about the various treatment options including mastectomy with or without reconstruction versus a lumpectomy and radiation.  She desires breast-conserving surgery.  I did recommend a sentinel lymph node biopsy.  I told her that endocrine therapy would be recommended.  I told her the decision regarding chemotherapy would not be made after her surgery.  She understands and wishes to proceed.  I did talk to her about the role of genetic testing.  She did want to undergo genetic testing.  We will schedule that today.  All of her questions were asked and answered.      Total time 45 minutes, which included reviewing her imaging, in-person visit and coordinating her care.    cc:   Debi Pizarro MD  [address]  [address], [facility] [address]  [place] [address]

## 2022-04-07 ENCOUNTER — HOSPITAL ENCOUNTER (OUTPATIENT)
Facility: CLINIC | Age: 67
Discharge: HOME | End: 2022-04-07
Attending: SURGERY | Admitting: INTERNAL MEDICINE
Payer: MEDICARE

## 2022-04-07 ENCOUNTER — PRE VISIT (OUTPATIENT)
Dept: SURGERY | Facility: CLINIC | Age: 67
End: 2022-04-07

## 2022-04-07 ENCOUNTER — VIRTUAL VISIT (OUTPATIENT)
Dept: SURGERY | Facility: CLINIC | Age: 67
End: 2022-04-07
Payer: MEDICARE

## 2022-04-07 ENCOUNTER — ANESTHESIA EVENT (OUTPATIENT)
Dept: SURGERY | Facility: AMBULATORY SURGERY CENTER | Age: 67
End: 2022-04-07
Payer: MEDICARE

## 2022-04-07 ENCOUNTER — LAB (OUTPATIENT)
Dept: LAB | Facility: CLINIC | Age: 67
End: 2022-04-07
Attending: SURGERY
Payer: MEDICARE

## 2022-04-07 DIAGNOSIS — Z11.59 ENCOUNTER FOR SCREENING FOR OTHER VIRAL DISEASES: ICD-10-CM

## 2022-04-07 DIAGNOSIS — C50.911 INVASIVE DUCTAL CARCINOMA OF BREAST, FEMALE, RIGHT (H): ICD-10-CM

## 2022-04-07 DIAGNOSIS — Z01.818 PREOP EXAMINATION: Primary | ICD-10-CM

## 2022-04-07 LAB — SARS-COV-2 RNA RESP QL NAA+PROBE: NEGATIVE

## 2022-04-07 PROCEDURE — U0003 INFECTIOUS AGENT DETECTION BY NUCLEIC ACID (DNA OR RNA); SEVERE ACUTE RESPIRATORY SYNDROME CORONAVIRUS 2 (SARS-COV-2) (CORONAVIRUS DISEASE [COVID-19]), AMPLIFIED PROBE TECHNIQUE, MAKING USE OF HIGH THROUGHPUT TECHNOLOGIES AS DESCRIBED BY CMS-2020-01-R: HCPCS

## 2022-04-07 PROCEDURE — 99000 SPECIMEN HANDLING OFFICE-LAB: CPT | Performed by: PATHOLOGY

## 2022-04-07 PROCEDURE — 99203 OFFICE O/P NEW LOW 30 MIN: CPT | Mod: 95 | Performed by: NURSE PRACTITIONER

## 2022-04-07 PROCEDURE — U0005 INFEC AGEN DETEC AMPLI PROBE: HCPCS

## 2022-04-07 ASSESSMENT — ENCOUNTER SYMPTOMS: ORTHOPNEA: 0

## 2022-04-07 ASSESSMENT — LIFESTYLE VARIABLES: TOBACCO_USE: 1

## 2022-04-07 ASSESSMENT — PAIN SCALES - GENERAL: PAINLEVEL: NO PAIN (0)

## 2022-04-07 NOTE — PROGRESS NOTES
Elisha is a 66 year old who is being evaluated via a billable video visit.      How would you like to obtain your AVS? MyChart  If the video visit is dropped, the invitation should be resent by: Text to cell phone: [phone]    HPI       Review of Systems         Objective    Vitals - Patient Reported  Pain Score: No Pain (0)        Physical Exam

## 2022-04-07 NOTE — PATIENT INSTRUCTIONS
Preparing for Your Surgery      Name:  Eilsha Gao   MRN:  [id number]   :  1955   Today's Date:  2022         Arriving for surgery:  Surgery date:  22  Arrival time:  10:10 am    Restrictions due to COVID 19:    Effective 2022  1 visitor may accompany patient and wait in the surgery waiting room  All visitors must wear a mask and social distance      parking is available for anyone with mobility limitations or disabilities. (Monday- Friday 7 am- 5 pm)    Please come to:    [facility]  [address] [place] [id number]    Please check in on the 5th floor at the Ambulatory Surgery Center       What can I eat or drink?    -  You may eat and drink normally until 8 hours before surgery. (Until 3:40 am)  -  You may have clear liquids up to 4 hours before surgery. (Until 7:40 am)  Examples of clear liquids:  Water  Clear broth  Juices (apple, white grape, white cranberry  and cider) without pulp  Noncarbonated, powder based beverages  (lemonade and Quique-Aid)  Sodas (Sprite, 7-Up, ginger ale and seltzer)  Coffee or tea (without milk or cream)  Gatorade    --No alcohol for at least 24 hours before surgery    Which medicines can I take?    Hold Aspirin for 7 days before surgery.   Hold Multivitamins for 7 days before surgery.  Hold Supplements for 7 days before surgery.  Hold Ibuprofen (Advil, Motrin) for 1 day before surgery--unless otherwise directed by surgeon.  Hold Naproxen (Aleve) for 4 days before surgery.    -  DO NOT take the following medications the day of surgery:  Magnesium, Vitamin D    -  PLEASE TAKE the following medications the day of surgery   Bupropion (Wellbutrin), Citalopram (Celexa),   Acetaminophen (Tylenol) if needed    How do I prepare myself?  - Please take 2 showers before surgery using Scrubcare or Hibiclens soap.    Use this soap only from the neck to your toes.     Leave the soap on your skin for one minute--then rinse  thoroughly.      You may use your own shampoo and conditioner; no other hair products.   - Please remove all jewelry and body piercings.  - No lotions, deodorants or fragrance.  - No makeup or fingernail polish.   - Bring your ID and insurance card.    -If you have a Deep Brain Stimulator, a Spinal Cord Stimulator or any implanted Neuro Device you must bring the remote to the Surgery Center         - All patients are required to have a Covid-19 test within 4 days of surgery/procedure.      -Patients will be contacted by the [facility] scheduling team within 1 week of surgery to make an appointment.      - Patients may call the Scheduling team at [phone] if they have not been scheduled within 4 days of  surgery.      ALL PATIENTS ARE REQUIRED TO HAVE A RESPONSIBLE ADULT TO DRIVE AND BE IN ATTENDANCE WITH THEM FOR 24 HOURS FOLLOWING SURGERY       Questions or Concerns:    -For questions regarding the day of surgery please contact the Ambulatory Surgery Center at [phone].    -If you have health changes between today and your surgery please contact your surgeon.     For questions after surgery please call your surgeons office.

## 2022-04-07 NOTE — H&P
Pre-Operative H & P     CC:  Preoperative exam to assess for increased cardiopulmonary risk while undergoing surgery and anesthesia.    Date of Encounter: 4/7/2022  Primary Care Physician:  Debi Pizarro     Reason for visit:   Encounter Diagnoses   Name Primary?     Preop examination Yes     Invasive ductal carcinoma of breast, female, right (H)        HPI  Elisha Gao is a 66 year old female who presents for pre-operative H & P in preparation for  Procedure Information     Case: [id number] Date/Time: 04/11/22 1140    Procedure: LUMPECTOMY, BREAST, LOCALIZED USING RADIOFREQUENCY IDENTIFICATION, sentinel lymph node biopsy (Right Breast)    Anesthesia type: General    Diagnosis: Invasive ductal carcinoma of breast, female, right (H) [C50.911]    Pre-op diagnosis: Invasive ductal carcinoma of breast, female, right (H) [C50.911]    Location: [place] [address] / [facility]    Providers: Santy Garcia MD          Elisha Gao is a 66 year old female with low back pain, osteopenia and depression that has recently been diagnosed with invasive ductal carcinoma of the right breast.  She underwent a screening mammogram on 3/17/22 and the results showed a possible concerning area so further diagnostic imaging was ordered.  Diagnostic imaging did confirm the suspicious area in the right breast.  A biopsy was completed on 3/28/22 and it returned positive for invasive ductal carcinoma.   The above listed procedure has now been recommended with likely radiation afterwards for further treatment.     History is obtained from the patient and chart review    Hx of abnormal bleeding or anti-platelet use: none    Menstrual history: No LMP recorded. Patient is postmenopausal.:     Past Medical History  Past Medical History:   Diagnosis Date     Chronic low back pain     for a couple years, worse at night, not evaluated     Depression      Invasive ductal carcinoma of breast, female, right  (H)      Osteopenia     FRAX  11/2%          Past Surgical History  Past Surgical History:   Procedure Laterality Date     HYSTERECTOMY      endometriosis, ovarian cysts, hormones x 10  years     LASIK Bilateral        Prior to Admission Medications  Current Outpatient Medications   Medication Sig Dispense Refill     buPROPion (WELLBUTRIN XL) 300 MG 24 hr tablet Take 1 tablet (300 mg) by mouth every morning For additional refills, please schedule annual appointment at [phone] 90 tablet 3     citalopram (CELEXA) 40 MG tablet Take 1 tablet (40 mg) by mouth daily (Patient taking differently: Take 40 mg by mouth every morning ) 90 tablet 3     magnesium 250 MG tablet Take 1 tablet by mouth every morning        Vitamin D, Cholecalciferol, 25 MCG (1000 UT) TABS Take 3 tablets by mouth every morning          Allergies  Allergies   Allergen Reactions     Alendronic Acid Nausea and Vomiting     Raloxifene Nausea     Risedronate Nausea and Vomiting       Social History  Social History     Socioeconomic History     Marital status:      Spouse name: Not on file     Number of children: 0     Years of education: Not on file     Highest education level: Not on file   Occupational History     Occupation: retired RN   Tobacco Use     Smoking status: Former Smoker     Packs/day: 0.50     Years: 20.00     Pack years: 10.00     Quit date:      Years since quittin.2     Smokeless tobacco: Never Used   Substance and Sexual Activity     Alcohol use: Yes     Alcohol/week: 3.0 standard drinks     Types: 3 Glasses of wine per week     Drug use: Never     Sexual activity: Not on file   Other Topics Concern     Not on file   Social History Narrative    Retired, nurse [employer]    Had cabin, golfing, reading, gym    , retired wife        No abnormal paps    No abnormal mammograms         Social Determinants of Health     Financial Resource Strain: Not on file   Food Insecurity: Not on file    Transportation Needs: Not on file   Physical Activity: Not on file   Stress: Not on file   Social Connections: Not on file   Intimate Partner Violence: Not on file   Housing Stability: Not on file       Family History  Family History   Problem Relation Age of Onset     Colon Cancer Mother         75     Lung Cancer Mother         90     Macular Degeneration Mother      Dementia Mother      Heart Disease Father         62     Bipolar Disorder Brother      Glaucoma No family hx of      Anesthesia Reaction No family hx of      Thrombosis No family hx of        Review of Systems  The complete review of systems is negative other than noted in the HPI or here.   Anesthesia Evaluation   Pt has had prior anesthetic. Type: General and MAC.    No history of anesthetic complications       ROS/MED HX  ENT/Pulmonary:     (+) tobacco use, Past use,  (-) COOPER risk factors and recent URI   Neurologic:  - neg neurologic ROS     Cardiovascular:     (+) -----No previous cardiac testing  (-) NUR and orthopnea/PND   METS/Exercise Tolerance: >4 METS    Hematologic:  - neg hematologic  ROS  (-) history of blood clots and history of blood transfusion   Musculoskeletal: Comment: Intermittent chronic low back pain      GI/Hepatic:  - neg GI/hepatic ROS     Renal/Genitourinary:  - neg Renal ROS     Endo: Comment: osteopenia      Psychiatric/Substance Use:     (+) psychiatric history depression     Infectious Disease:  - neg infectious disease ROS     Malignancy:   (+) Malignancy, History of Breast.Breast CA Active status post.        Other:  - neg other ROS    (+) , H/O Chronic Pain,        Virtual visit -  No vitals were obtained    Physical Exam  Constitutional: Awake, alert, cooperative, no apparent distress, and appears stated age.  Eyes: Pupils equal  HENT: Normocephalic  Respiratory: non labored breathing   Neurologic: Awake, alert, oriented to name, place and time.   Neuropsychiatric: Calm, cooperative. Normal affect.      Prior  Labs/Diagnostic Studies   All labs and imaging personally reviewed     EKG/ stress test - none    Labs  Component      Latest Ref Rng & Units 3/2/2022   Sodium      133 - 144 mmol/L 139   Potassium      3.4 - 5.3 mmol/L 4.3   Chloride      94 - 109 mmol/L 108   Carbon Dioxide      20 - 32 mmol/L 26   Anion Gap      3 - 14 mmol/L 5   Urea Nitrogen      7 - 30 mg/dL 20   Creatinine      0.52 - 1.04 mg/dL 0.92   Calcium      8.5 - 10.1 mg/dL 9.0   Glucose      70 - 99 mg/dL 96   Alkaline Phosphatase      40 - 150 U/L 72   AST      0 - 45 U/L 14   ALT      0 - 50 U/L 18   Protein Total      6.8 - 8.8 g/dL 7.1   Albumin      3.4 - 5.0 g/dL 3.8   Bilirubin Total      0.2 - 1.3 mg/dL 0.6   GFR Estimate      >60 mL/min/1.73m2 68    CBC ordered for DOS    The patient's records and results personally reviewed by this provider.     Outside records reviewed from: Care Everywhere      Assessment      Elisha Gao is a 66 year old female seen as a PAC referral for risk assessment and optimization for anesthesia.    Plan/Recommendations  Pt will be optimized for the proposed procedure.  See below for details on the assessment, risk, and preoperative recommendations    NEUROLOGY  - No history of TIA, CVA or seizure    -Post Op delirium risk factors:  No risk identified    ENT  - No current airway concerns.  Will need to be reassessed day of surgery.  Mallampati: Unable to assess  TM: Unable to assess    CARDIAC  - no noted cardiac history  - METS (Metabolic Equivalents) - walks, goes golfing and does weight lifting for exercise  Patient performs 4 or more METS exercise without symptoms            Total Score: 0      RCRI-Very low risk: Class 1 0.4% complication rate            Total Score: 0        PULMONARY    COOPER Low Risk            Total Score: 1    COOPER: Over 50 ys old      - Denies asthma or inhaler use  - Tobacco History      History   Smoking Status     Former Smoker     Packs/day: 0.50     Years: 20.00     Quit date: 1987  "  Smokeless Tobacco     Never Used       GI  - denies GERD  PONV High Risk  Total Score: 3           1 AN PONV: Pt is Female    1 AN PONV: Patient is not a current smoker    1 AN PONV: Intended Post Op Opioids            ENDOCRINE   - BMI: Estimated body mass index is 22.91 kg/m  as calculated from the following:    Height as of 4/4/22: 1.63 m (5' 4.17\").    Weight as of 4/4/22: 60.9 kg (134 lb 3.2 oz).    - No history of Diabetes Mellitus    HEME  VTE Medium Risk 1.8%            Total Score: 6    VTE: Greater than 59 yrs old    VTE: Current cancer      - No history of abnormal bleeding or antiplatelet use.      MSK  + chronic intermittent low back pain.  Consider cautious positioning.     PSYCH  - depression is stable with current meds per patient report            The patient is optimized for their procedure. AVS with information on surgery time/arrival time, meds and NPO status given by nursing staff. No further diagnostic testing indicated.    Virtual visit - Please refer to the physical examination documented by the anesthesiologist in the anesthesia record on the day of surgery.        Video-Visit Details    Type of service:  Video Visit    Patient verbally consented to video service today: YES    Video Start Time: 0755  Video End Time (time video stopped): 0805    Originating Location (pt. Location): Home    Distant Location (provider location):  provider's home    Mode of Communication:  Video Conference via [employer]         On the day of service:     Prep time: 0 minutes  Visit time: 10 minutes  Documentation time: 9 minutes  ------------------------------------------  Total time: 19 minutes      ANTONETTE Lyman CNP  Preoperative Assessment Center  [facility]  Clinic and [facility] Center  Phone: [phone]  Fax: [phone]  "

## 2022-04-08 ENCOUNTER — ANCILLARY PROCEDURE (OUTPATIENT)
Dept: MAMMOGRAPHY | Facility: CLINIC | Age: 67
End: 2022-04-08
Attending: SURGERY
Payer: MEDICARE

## 2022-04-08 DIAGNOSIS — C50.911 INVASIVE DUCTAL CARCINOMA OF BREAST, FEMALE, RIGHT (H): ICD-10-CM

## 2022-04-08 PROCEDURE — 19285 PERQ DEV BREAST 1ST US IMAG: CPT | Mod: RT | Performed by: RADIOLOGY

## 2022-04-08 PROCEDURE — 77065 DX MAMMO INCL CAD UNI: CPT | Mod: RT | Performed by: RADIOLOGY

## 2022-04-08 RX ORDER — LIDOCAINE HYDROCHLORIDE 10 MG/ML
10 INJECTION, SOLUTION EPIDURAL; INFILTRATION; INTRACAUDAL; PERINEURAL ONCE
Status: COMPLETED | OUTPATIENT
Start: 2022-04-08 | End: 2022-04-08

## 2022-04-08 RX ADMIN — LIDOCAINE HYDROCHLORIDE 10 ML: 10 INJECTION, SOLUTION EPIDURAL; INFILTRATION; INTRACAUDAL; PERINEURAL at 14:50

## 2022-04-11 ENCOUNTER — HOSPITAL ENCOUNTER (OUTPATIENT)
Dept: NUCLEAR MEDICINE | Facility: CLINIC | Age: 67
Setting detail: NUCLEAR MEDICINE
Discharge: HOME | End: 2022-04-11
Attending: SURGERY | Admitting: SURGERY
Payer: MEDICARE

## 2022-04-11 ENCOUNTER — ANCILLARY PROCEDURE (OUTPATIENT)
Dept: MAMMOGRAPHY | Facility: CLINIC | Age: 67
End: 2022-04-11
Attending: SURGERY
Payer: MEDICARE

## 2022-04-11 ENCOUNTER — HOSPITAL ENCOUNTER (OUTPATIENT)
Facility: AMBULATORY SURGERY CENTER | Age: 67
Discharge: HOME | End: 2022-04-11
Attending: SURGERY
Payer: MEDICARE

## 2022-04-11 ENCOUNTER — ANESTHESIA (OUTPATIENT)
Dept: SURGERY | Facility: AMBULATORY SURGERY CENTER | Age: 67
End: 2022-04-11
Payer: MEDICARE

## 2022-04-11 VITALS
BODY MASS INDEX: 21.66 KG/M2 | DIASTOLIC BLOOD PRESSURE: 79 MMHG | RESPIRATION RATE: 16 BRPM | OXYGEN SATURATION: 97 % | SYSTOLIC BLOOD PRESSURE: 130 MMHG | HEIGHT: 65 IN | HEART RATE: 65 BPM | TEMPERATURE: 97.5 F | WEIGHT: 130 LBS

## 2022-04-11 DIAGNOSIS — C50.911 INVASIVE DUCTAL CARCINOMA OF BREAST, FEMALE, RIGHT (H): ICD-10-CM

## 2022-04-11 LAB
ERYTHROCYTE [DISTWIDTH] IN BLOOD BY AUTOMATED COUNT: 13 % (ref 10–15)
HCT VFR BLD AUTO: 43.9 % (ref 35–47)
HGB BLD-MCNC: 14.6 G/DL (ref 11.7–15.7)
MCH RBC QN AUTO: 30.7 PG (ref 26.5–33)
MCHC RBC AUTO-ENTMCNC: 33.3 G/DL (ref 31.5–36.5)
MCV RBC AUTO: 92 FL (ref 78–100)
PLATELET # BLD AUTO: 225 10E3/UL (ref 150–450)
RBC # BLD AUTO: 4.76 10E6/UL (ref 3.8–5.2)
WBC # BLD AUTO: 5 10E3/UL (ref 4–11)

## 2022-04-11 PROCEDURE — 76098 X-RAY EXAM SURGICAL SPECIMEN: CPT | Mod: RT | Performed by: RADIOLOGY

## 2022-04-11 PROCEDURE — 19301 PARTIAL MASTECTOMY: CPT | Mod: RT

## 2022-04-11 PROCEDURE — 38900 IO MAP OF SENT LYMPH NODE: CPT | Mod: RT | Performed by: SURGERY

## 2022-04-11 PROCEDURE — 38525 BIOPSY/REMOVAL LYMPH NODES: CPT | Mod: RT | Performed by: SURGERY

## 2022-04-11 PROCEDURE — 38792 RA TRACER ID OF SENTINL NODE: CPT

## 2022-04-11 PROCEDURE — 99207 NM LYMPHOSCINTIGRAPHY INJECTION ONLY: CPT | Performed by: RADIOLOGY

## 2022-04-11 PROCEDURE — 19301 PARTIAL MASTECTOMY: CPT | Mod: RT | Performed by: SURGERY

## 2022-04-11 PROCEDURE — 38525 BIOPSY/REMOVAL LYMPH NODES: CPT | Mod: RT

## 2022-04-11 PROCEDURE — 88307 TISSUE EXAM BY PATHOLOGIST: CPT | Mod: TC | Performed by: SURGERY

## 2022-04-11 RX ORDER — SODIUM CHLORIDE, SODIUM LACTATE, POTASSIUM CHLORIDE, CALCIUM CHLORIDE 600; 310; 30; 20 MG/100ML; MG/100ML; MG/100ML; MG/100ML
INJECTION, SOLUTION INTRAVENOUS CONTINUOUS
Status: DISCONTINUED | OUTPATIENT
Start: 2022-04-11 | End: 2022-04-11 | Stop reason: HOSPADM

## 2022-04-11 RX ORDER — SODIUM CHLORIDE, SODIUM LACTATE, POTASSIUM CHLORIDE, CALCIUM CHLORIDE 600; 310; 30; 20 MG/100ML; MG/100ML; MG/100ML; MG/100ML
INJECTION, SOLUTION INTRAVENOUS CONTINUOUS PRN
Status: DISCONTINUED | OUTPATIENT
Start: 2022-04-11 | End: 2022-04-11

## 2022-04-11 RX ORDER — ISOSULFAN BLUE 50 MG/5ML
INJECTION, SOLUTION SUBCUTANEOUS PRN
Status: DISCONTINUED | OUTPATIENT
Start: 2022-04-11 | End: 2022-04-11 | Stop reason: HOSPADM

## 2022-04-11 RX ORDER — LIDOCAINE HYDROCHLORIDE 20 MG/ML
INJECTION, SOLUTION INFILTRATION; PERINEURAL PRN
Status: DISCONTINUED | OUTPATIENT
Start: 2022-04-11 | End: 2022-04-11

## 2022-04-11 RX ORDER — PROPOFOL 10 MG/ML
INJECTION, EMULSION INTRAVENOUS CONTINUOUS PRN
Status: DISCONTINUED | OUTPATIENT
Start: 2022-04-11 | End: 2022-04-11

## 2022-04-11 RX ORDER — ONDANSETRON 2 MG/ML
INJECTION INTRAMUSCULAR; INTRAVENOUS PRN
Status: DISCONTINUED | OUTPATIENT
Start: 2022-04-11 | End: 2022-04-11

## 2022-04-11 RX ORDER — FENTANYL CITRATE 50 UG/ML
INJECTION, SOLUTION INTRAMUSCULAR; INTRAVENOUS PRN
Status: DISCONTINUED | OUTPATIENT
Start: 2022-04-11 | End: 2022-04-11

## 2022-04-11 RX ORDER — CEFAZOLIN SODIUM 2 G/100ML
2 INJECTION, SOLUTION INTRAVENOUS EVERY 8 HOURS
Status: DISCONTINUED | OUTPATIENT
Start: 2022-04-11 | End: 2022-04-11 | Stop reason: HOSPADM

## 2022-04-11 RX ORDER — PROPOFOL 10 MG/ML
INJECTION, EMULSION INTRAVENOUS PRN
Status: DISCONTINUED | OUTPATIENT
Start: 2022-04-11 | End: 2022-04-11

## 2022-04-11 RX ORDER — DEXAMETHASONE SODIUM PHOSPHATE 4 MG/ML
INJECTION, SOLUTION INTRA-ARTICULAR; INTRALESIONAL; INTRAMUSCULAR; INTRAVENOUS; SOFT TISSUE PRN
Status: DISCONTINUED | OUTPATIENT
Start: 2022-04-11 | End: 2022-04-11

## 2022-04-11 RX ORDER — GLYCOPYRROLATE 0.2 MG/ML
INJECTION, SOLUTION INTRAMUSCULAR; INTRAVENOUS PRN
Status: DISCONTINUED | OUTPATIENT
Start: 2022-04-11 | End: 2022-04-11

## 2022-04-11 RX ADMIN — PROPOFOL 150 MG: 10 INJECTION, EMULSION INTRAVENOUS at 12:37

## 2022-04-11 RX ADMIN — ONDANSETRON 4 MG: 2 INJECTION INTRAMUSCULAR; INTRAVENOUS at 12:45

## 2022-04-11 RX ADMIN — SODIUM CHLORIDE, SODIUM LACTATE, POTASSIUM CHLORIDE, CALCIUM CHLORIDE: 600; 310; 30; 20 INJECTION, SOLUTION INTRAVENOUS at 12:33

## 2022-04-11 RX ADMIN — DEXAMETHASONE SODIUM PHOSPHATE 4 MG: 4 INJECTION, SOLUTION INTRA-ARTICULAR; INTRALESIONAL; INTRAMUSCULAR; INTRAVENOUS; SOFT TISSUE at 12:45

## 2022-04-11 RX ADMIN — FENTANYL CITRATE 50 MCG: 50 INJECTION, SOLUTION INTRAMUSCULAR; INTRAVENOUS at 12:48

## 2022-04-11 RX ADMIN — FENTANYL CITRATE 50 MCG: 50 INJECTION, SOLUTION INTRAMUSCULAR; INTRAVENOUS at 12:43

## 2022-04-11 RX ADMIN — PROPOFOL 150 MCG/KG/MIN: 10 INJECTION, EMULSION INTRAVENOUS at 12:40

## 2022-04-11 RX ADMIN — LIDOCAINE HYDROCHLORIDE 80 MG: 20 INJECTION, SOLUTION INFILTRATION; PERINEURAL at 12:37

## 2022-04-11 RX ADMIN — GLYCOPYRROLATE 0.2 MG: 0.2 INJECTION, SOLUTION INTRAMUSCULAR; INTRAVENOUS at 12:56

## 2022-04-11 ASSESSMENT — ENCOUNTER SYMPTOMS: ORTHOPNEA: 0

## 2022-04-11 ASSESSMENT — LIFESTYLE VARIABLES: TOBACCO_USE: 1

## 2022-04-11 NOTE — ANESTHESIA PREPROCEDURE EVALUATION
Anesthesia Pre-Procedure Evaluation    Patient: Elisha Gao   MRN: [id number] : 1955        Procedure : Procedure(s):  LUMPECTOMY, BREAST, LOCALIZED USING RADIOFREQUENCY IDENTIFICATION, sentinel lymph node biopsy          Past Medical History:   Diagnosis Date     Chronic low back pain     for a couple years, worse at night, not evaluated     Depression      Invasive ductal carcinoma of breast, female, right (H)      Osteopenia     FRAX  11/2%         Past Surgical History:   Procedure Laterality Date     HYSTERECTOMY      endometriosis, ovarian cysts, hormones x 10  years     LASIK Bilateral       Allergies   Allergen Reactions     Alendronic Acid Nausea and Vomiting     Raloxifene Nausea     Risedronate Nausea and Vomiting      Social History     Tobacco Use     Smoking status: Former Smoker     Packs/day: 0.50     Years: 20.00     Pack years: 10.00     Quit date:      Years since quittin.2     Smokeless tobacco: Never Used   Substance Use Topics     Alcohol use: Yes     Alcohol/week: 3.0 standard drinks     Types: 3 Glasses of wine per week      Wt Readings from Last 1 Encounters:   22 59 kg (130 lb)        Anesthesia Evaluation   Pt has had prior anesthetic. Type: General and MAC.    No history of anesthetic complications       ROS/MED HX  ENT/Pulmonary:     (+) tobacco use, Past use,  (-) COOPER risk factors and recent URI   Neurologic:  - neg neurologic ROS     Cardiovascular:     (+) -----No previous cardiac testing  (-) NUR and orthopnea/PND   METS/Exercise Tolerance: >4 METS    Hematologic:  - neg hematologic  ROS  (-) history of blood clots and history of blood transfusion   Musculoskeletal: Comment: Intermittent chronic low back pain      GI/Hepatic:  - neg GI/hepatic ROS     Renal/Genitourinary:  - neg Renal ROS     Endo: Comment: osteopenia      Psychiatric/Substance Use:     (+) psychiatric history depression     Infectious Disease:  - neg infectious disease ROS      Malignancy:   (+) Malignancy, History of Breast.Breast CA Active status post.        Other:  - neg other ROS    (+) , H/O Chronic Pain,        Physical Exam    Airway        Mallampati: I   TM distance: > 3 FB   Neck ROM: full   Mouth opening: > 3 cm    Respiratory Devices and Support         Dental  no notable dental history         Cardiovascular   cardiovascular exam normal          Pulmonary   pulmonary exam normal                OUTSIDE LABS:  CBC:   Lab Results   Component Value Date    WBC 5.0 04/11/2022    WBC 6.8 04/04/2019    HGB 14.6 04/11/2022    HGB 14.6 04/04/2019    HCT 43.9 04/11/2022    HCT 43.8 04/04/2019     04/11/2022     04/04/2019     BMP:   Lab Results   Component Value Date     03/02/2022     04/04/2019    POTASSIUM 4.3 03/02/2022    POTASSIUM 4.1 04/04/2019    CHLORIDE 108 03/02/2022    CHLORIDE 106 04/04/2019    CO2 26 03/02/2022    CO2 24 04/04/2019    BUN 20 03/02/2022    BUN 23 04/04/2019    CR 0.92 03/02/2022    CR 0.82 04/04/2019    GLC 96 03/02/2022    GLC 87 04/04/2019     COAGS: No results found for: PTT, INR, FIBR  POC: No results found for: BGM, HCG, HCGS  HEPATIC:   Lab Results   Component Value Date    ALBUMIN 3.8 03/02/2022    PROTTOTAL 7.1 03/02/2022    ALT 18 03/02/2022    AST 14 03/02/2022    ALKPHOS 72 03/02/2022    BILITOTAL 0.6 03/02/2022     OTHER:   Lab Results   Component Value Date    A1C 5.5 01/04/2021    JOSE 9.0 03/02/2022    TSH 1.78 01/04/2021       Anesthesia Plan    ASA Status:  3   NPO Status:  NPO Appropriate    Anesthesia Type: General.     - Airway: LMA   Induction: Intravenous.   Maintenance: Balanced.        Consents    Anesthesia Plan(s) and associated risks, benefits, and realistic alternatives discussed. Questions answered and patient/representative(s) expressed understanding.    - Discussed:     - Discussed with:  Patient         Postoperative Care    Pain management: IV analgesics, Oral pain medications, Multi-modal  analgesia.   PONV prophylaxis: Background Propofol Infusion, Dexamethasone or Solumedrol, Ondansetron (or other 5HT-3)     Comments:                GABRIEL WHITLOCK MD

## 2022-04-11 NOTE — ANESTHESIA POSTPROCEDURE EVALUATION
Patient: Elisha Gao    Procedure: Procedure(s):  LUMPECTOMY, BREAST, LOCALIZED USING RADIOFREQUENCY IDENTIFICATION, sentinel lymph node biopsy       Anesthesia Type:  General    Note:  Disposition: Outpatient   Postop Pain Control: Uneventful            Sign Out: Well controlled pain   PONV: No   Neuro/Psych: Uneventful            Sign Out: Acceptable/Baseline neuro status   Airway/Respiratory: Uneventful            Sign Out: Acceptable/Baseline resp. status   CV/Hemodynamics: Uneventful            Sign Out: Acceptable CV status; No obvious hypovolemia; No obvious fluid overload   Other NRE: NONE   DID A NON-ROUTINE EVENT OCCUR? No           Last vitals:  Vitals Value Taken Time   /79 04/11/22 1356   Temp 36.4  C (97.6  F) 04/11/22 1356   Pulse 71 04/11/22 1356   Resp 12 04/11/22 1356   SpO2 97 % 04/11/22 1356       Electronically Signed By: GABRIEL WHITLOCK MD  April 11, 2022  1:58 PM

## 2022-04-11 NOTE — DISCHARGE INSTRUCTIONS
"[facility]  Home Care Following Anesthesia  For 24 hours after surgery:  Get plenty of rest.  A responsible adult must stay with you for at least 24 hours after you leave the surgery center.  Do not drive or use heavy equipment.  If you have weakness or tingling, don't drive or use heavy equipment until this feeling goes away.   Do not drink alcohol.   Avoid strenuous or risky activities.  Ask for help when climbing stairs.  You may feel lightheaded.  IF so, sit for a few minutes before standing.  Have someone help you get up.   If you have nausea (feel sick to your stomach): Drink only clear liquids such as apple juice, ginger ale, broth or 7-Up.  Rest may also help.  Be sure to drink enough fluids.  Move to a regular diet as you feel able.   You may have a slight fever.  Call the doctor if your fever is over 100 F (37.7 C) (taken under the tongue) or lasts longer than 24 hours.  You may have a dry mouth, a sore throat, muscle aches or trouble sleeping. These should go away after 24 hours.  Do not make important or legal decisions.   It is recommended to avoid smoking.        Today you received a Marcaine or bupivacaine block to numb the nerves near your surgery site.  This is a block using local anesthetic or \"numbing\" medication injected around the nerves to anesthetize or \"numb\" the area supplied by those nerves.  This block is injected into the muscle layer near your surgical site.  The medication may numb the location where you had surgery for 6-18 hours, but may last up to 24 hours.  If your surgical site is an arm or leg you should be careful with your affected limb, since it is possible to injure your limb without being aware of it due to the numbing.  Until full feeling returns, you should guard against bumping or hitting your limb, and avoid extreme hot or cold temperatures on the skin.  As the block wears off, the feeling will return as a tingling or prickly " sensation near your surgical site.  You will experience more discomfort from your incision as the feeling returns.  You may want to take a pain pill (a narcotic or Tylenol if this was prescribed by your surgeon) when you start to experience mild pain before the pain beccomes more severe.  If your pain medications do not control your pain you should notifiy your surgeon.    Tips for taking pain medications  To get the best pain relief possible, remember these points:  Take pain medications as directed, before pain becomes severe.  Pain medication can upset your stomach: taking it with food may help.  Constipation is a common side effect of pain medication. Drink plenty of  fluids.  Eat foods high in fiber. Take a stool softener if recommended by your doctor or pharmacist.  Do not drink alcohol, drive or operate machinery while taking pain medications.  Ask about other ways to control pain, such as with heat, ice or relaxation.    Tylenol/Acetaminophen Consumption  To help encourage the safe use of acetaminophen, the makers of TYLENOL  have lowered the maximum daily dose for single-ingredient Extra Strength TYLENOL  (acetaminophen) products sold in the [place] from 8 pills per day (4,000 mg) to 6 pills per day (3,000 mg). The dosing interval has also changed from 2 pills every 4-6 hours to 2 pills every 6 hours.  If you feel your pain relief is insufficient, you may take Tylenol/Acetaminophen in addition to your narcotic pain medication.   Be careful not to exceed 3,000 mg of Tylenol/Acetaminophen in a 24 hour period from all sources.  If you are taking extra strength Tylenol/acetaminophen (500 mg), the maximum dose is 6 tablets in 24 hours.  If you are taking regular strength acetaminophen (325 mg), the maximum dose is 9 tablets in 24 hours.    Call a doctor for any of the following:  Signs of infection (fever, growing tenderness at the surgery site, a large amount of drainage or bleeding, severe pain, foul-smelling  drainage, redness, swelling).  It has been over 8 to 10 hours since surgery and you are still not able to urinate (pass water).  Headache for over 24 hours.  Numbness, tingling or weakness the day after surgery (if you had spinal anesthesia).  Signs of Covid-19 infection (temperature over 100 degrees, shortness of breath, cough, loss of taste/smell, generalized body aches, persistent headache, chills, sore throat, nausea/vomiting/diarrhea)  Your doctor is:       Dr. Santy Garcia, [facility]: [phone]               Or dial [phone] and ask for the resident on call for:  [facility]  For emergency care, call the:  [facility]:  [phone] (TTY for hearing impaired: [phone])

## 2022-04-11 NOTE — ANESTHESIA CARE TRANSFER NOTE
Patient: Elisha Gao    Procedure: Procedure(s):  LUMPECTOMY, BREAST, LOCALIZED USING RADIOFREQUENCY IDENTIFICATION, sentinel lymph node biopsy       Diagnosis: Invasive ductal carcinoma of breast, female, right (H) [C50.911]  Diagnosis Additional Information: No value filed.    Anesthesia Type:   General     Note:    Oropharynx: oropharynx clear of all foreign objects  Level of Consciousness: awake  Oxygen Supplementation: face mask    Independent Airway: airway patency satisfactory and stable  Dentition: dentition unchanged    Report to RN Given: handoff report given  Patient transferred to: PACU    Handoff Report: Identifed the Patient, Identified the Reponsible Provider, Reviewed the pertinent medical history, Discussed the surgical course, Reviewed Intra-OP anesthesia mangement and issues during anesthesia, Set expectations for post-procedure period and Allowed opportunity for questions and acknowledgement of understanding      Vitals:  Vitals Value Taken Time   /80 04/11/22 1347   Temp 36.4  C (97.6  F) 04/11/22 1347   Pulse 63 04/11/22 1347   Resp 12 04/11/22 1347   SpO2 99 % 04/11/22 1347       Electronically Signed By: ANTONETTE Villafana CRNA  April 11, 2022  1:48 PM

## 2022-04-11 NOTE — OP NOTE
Procedure Date: 04/11/2022    PREOPERATIVE DIAGNOSIS:  Right breast cancer.    POSTOPERATIVE DIAGNOSIS:  Right breast cancer.    PROCEDURE:  Lymphatic mapping, right axillary sentinel lymph node biopsy x2, right seed-localized lumpectomy.    ATTENDING SURGEON:  Santy Garcia MD    ANESTHESIA:  General with LMA.    INDICATIONS FOR PROCEDURE:  The patient is a 66-year-old woman who was diagnosed with a stage I breast cancer.  She had a radiofrequency seed placed in her right breast last week.  She now presents for surgical treatment.    DESCRIPTION OF PROCEDURE:  After informed consent, the patient was brought to the operating room, given a general anesthetic, and LMA was placed.  I injected technetium sulfur colloid and isosulfan blue into her right breast.  She was prepped and draped in the usual fashion.  I identified a transcutaneous hot spot in the right axilla.  Local anesthetic was administered and a transverse axillary incision was made with a scalpel.  The Bovie cautery was used to incise subcutaneous tissues.  Dissection proceeded through the clavipectoral fascia.  I identified 2 blue radioactive lymph nodes.  [place] lymph node #1 was not radioactive.  [place] lymph #2 was had ex vivo counts of 25 counts per second.  After removal of the second sentinel lymph node, there were no additional blue, radioactive or palpable lymph nodes.  Next, I used a localizer to identify the radiofrequency seed in her right breast.  Local anesthetic was administered to the upper outer portion of the right breast.  A curvilinear incision was made with a scalpel.  The Bovie cautery was used to incise subcutaneous tissues.  I dissected around the seed using the localizer as to ensure adequate surgical margins.  The specimen was removed, oriented and sent to the breast center.  I did take additional margins at the superior, anterior and lateral parts of the cavity.  Surgical clips were placed in all 4 quadrants of the resection  bed.  The dermis was closed on both incisions with interrupted 3-0 Vicryl suture.  The skin was closed with a running 4-0 PDS subcuticular stitch.  Dermabond was placed and the patient was taken to the recovery room in stable condition.      Santy Garcia MD        D: 2022   T: 2022   MT: [id number]    Name:     NAHUM CHOI  MRN:      [id number]        Account:        [id number]   :      1955           Procedure Date: 2022     Document: [id number]

## 2022-04-12 ENCOUNTER — PATIENT OUTREACH (OUTPATIENT)
Dept: ONCOLOGY | Facility: CLINIC | Age: 67
End: 2022-04-12

## 2022-04-12 NOTE — TELEPHONE ENCOUNTER
POST-OP CALL  Apr 12, 2022    Elisha Gao is a 66 year old female s/p lumpectomy and sentinel lymph node biopsy 4/11 with Dr. Garcia for breast cancer.     Pain: Patient reports pain is controlled with acetaminophen.   Incisions: Patient denies surgical site swelling, bruising, erythema, bleeding or drainage.   Fevers/chills: Patient denies fever/chills.  Eating/drinking: Patient is able to eat and drink without any complaints.   Bowel habits: No concerns.   Urine output: Voiding without difficulty.  Follow up appointment scheduled on 4/29 with Dr. Garcia.  Patient will call with any questions or concerns.    Lanette Herrera PA-C

## 2022-04-14 NOTE — RESULT ENCOUNTER NOTE
Swati Tello,    Your test results are attached.  Your pre-op CBC (blood counts) was normal.         Viviana Okeefe DNP, RN, ANP-C

## 2022-04-18 ENCOUNTER — MYC MEDICAL ADVICE (OUTPATIENT)
Dept: ONCOLOGY | Facility: CLINIC | Age: 67
End: 2022-04-18

## 2022-04-20 ENCOUNTER — LAB (OUTPATIENT)
Dept: LAB | Facility: CLINIC | Age: 67
End: 2022-04-20
Payer: MEDICARE

## 2022-04-20 DIAGNOSIS — C50.911 INVASIVE DUCTAL CARCINOMA OF BREAST, FEMALE, RIGHT (H): Primary | ICD-10-CM

## 2022-04-20 PROCEDURE — 81162 BRCA1&2 GEN FULL SEQ DUP/DEL: CPT | Performed by: SURGERY

## 2022-04-21 LAB
PATH REPORT.COMMENTS IMP SPEC: NORMAL
PATH REPORT.FINAL DX SPEC: NORMAL
PATH REPORT.GROSS SPEC: NORMAL
PATH REPORT.MICROSCOPIC SPEC OTHER STN: NORMAL
PATH REPORT.RELEVANT HX SPEC: NORMAL
PATHOLOGY SYNOPTIC REPORT: NORMAL
PHOTO IMAGE: NORMAL

## 2022-04-21 PROCEDURE — 88305 TISSUE EXAM BY PATHOLOGIST: CPT | Mod: 26 | Performed by: PATHOLOGY

## 2022-04-21 PROCEDURE — 88307 TISSUE EXAM BY PATHOLOGIST: CPT | Mod: 26 | Performed by: PATHOLOGY

## 2022-04-21 PROCEDURE — 88360 TUMOR IMMUNOHISTOCHEM/MANUAL: CPT | Mod: 26 | Performed by: PATHOLOGY

## 2022-04-22 PROCEDURE — G0452 MOLECULAR PATHOLOGY INTERPR: HCPCS | Mod: 26 | Performed by: STUDENT IN AN ORGANIZED HEALTH CARE EDUCATION/TRAINING PROGRAM

## 2022-04-24 NOTE — PROGRESS NOTES
Oncology Visit:  Date on this visit: 4/25/2022    Diagnosis: right breast cancer.    Primary Physician: Debi Pizarro     History Of Present Illness:  Ms. Gao is a 66 year old female with right breast cancer.  Routine screening mammogram on 3/17/2022 showed an asymmetry in the upper outer right breast.  Diagnostic breast imaging confirmed a 4 mm spiculated mass at 11:00, 4 cm from the nipple of the right breast.  Biopsy was consistent with a grade 2 invasive ductal carcinoma, ER strong in 98%, NE moderate in 40%, and HER2 negative by FISH and atypical ductal hyperplasia.  Patient underwent right breast lumpectomy and right axillary sentinel lymph node biopsy under the care of Dr. Garcia on 4/11/2022.  Pathology showed a grade 2 invasive ductal carcinoma measuring 9 mm with associated low grade DCIS.  Surgical margins were negative.  There was no lymphovascular invasion and two sentinel lymph nodes were benign.     Interval History:  Ms. Gao comes into clinic today for routine breast cancer follow-up.  Since her last visit, she underwent a right breast lumpectomy.  In general, the procedure went well for her.  She has resumed all of her daily activities.  She denies current breast pain, swelling, or lumps.  She has full range of motion of her right upper extremity.  She denies redness of her incision or fevers or chills.  She does not have any calf swelling, shortness of breath, or chest pain.  She reports some pain in the area of her axillary incision.  When clothing touches the area it is more tender.  It is not bothersome enough for her to have to take medication for it.  The remainder of a complete 12 point review of systems was reviewed with the patient was negative with exception that mentioned above.    Past Medical/Surgical History:  Past Medical History:   Diagnosis Date     Chronic low back pain     for a couple years, worse at night, not evaluated     Depression      Invasive ductal carcinoma of  breast, female, right (H)      Osteopenia     FRAX  11/2%   2022   Colonoscopy in 2012 w/o polyps, next due in 2022.    Past Surgical History:   Procedure Laterality Date     HYSTERECTOMY  1987    endometriosis, ovarian cysts, hormones x 10  years     LASIK Bilateral      LUMPECTOMY, BREAST, LOCALIZED USING RADIOFREQUENCY IDENTIFICATION Right 4/11/2022    Procedure: LUMPECTOMY, BREAST, LOCALIZED USING RADIOFREQUENCY IDENTIFICATION, sentinel lymph node biopsy;  Surgeon: Santy Garcia MD;  Location: [facility] OR     Allergies:  Allergies as of 04/25/2022 - Reviewed 04/11/2022   Allergen Reaction Noted     Alendronic acid Nausea and Vomiting 05/26/2010     Raloxifene Nausea 05/26/2010     Risedronate Nausea and Vomiting 05/26/2010     Current Medications:  Current Outpatient Medications   Medication Sig Dispense Refill     buPROPion (WELLBUTRIN XL) 300 MG 24 hr tablet Take 1 tablet (300 mg) by mouth every morning For additional refills, please schedule annual appointment at [phone] 90 tablet 3     citalopram (CELEXA) 40 MG tablet Take 1 tablet (40 mg) by mouth daily (Patient taking differently: Take 40 mg by mouth every morning) 90 tablet 3     magnesium 250 MG tablet Take 1 tablet by mouth every morning        Vitamin D, Cholecalciferol, 25 MCG (1000 UT) TABS Take 3 tablets by mouth every morning         Family and Social History:  See initial consultation dated 4/4/2022 for further details.    Physical Exam:  /87   Pulse 69   Temp 97.9  F (36.6  C) (Oral)   Wt 61.2 kg (135 lb)   SpO2 100%   BMI 22.47 kg/m    General:  Well appearing adult female in NAD.  Alert and oriented x 3.  HEENT:  Normocephalic.  Sclera anicteric.  MMM.  No lesions of the oropharynx.  Breast:  Right axillary and right upper outer breast incisions are well healed and without erythema or edema.    Ext:  No pitting edema of the bilateral lower extremities.    Musculo:  Full ROM of the bilateral upper extremities.  Psych:  Mood and  affect appear normal.    Laboratory/Imaging Studies  4/11/2022 right breast lumpectomy and right axillary sentinel lymph node biopsy:  - Grade 2 invasive ductal carcinoma measuring 9 mm.  - low grade DCIS  - No lymphovascular invasion  - surgical margins are negative for both invasive carcinoma and DCIS  - Two benign sentinel lymph nodes.    ASSESSMENT/PLAN:  65 yo female with stage Ia, [id number], grade 2, ER/AL positive, HER-2 negative invasive ductal carcinoma of the right breast.   She is s/p right breast lumpectomy and right axillary sentinel lymph node procedure.    1.  Right breast cancer:  We reviewed the pathology from her right breast lumpectomy and sentinel lymph node procedure performed on 4/11/2022.  The pathology showed a 9 mm invasive cancer in the right breast.  Surgical margins were negative.  Two sentinel lymph nodes were negative for malignancy.    I recommend sending an Oncotype DX on the excision specimen.  We discussed this will take approximately two weeks to return.  In the meantime, I recommend she meet with Dr. Sánchez to discuss radiation.  We reviewed radiation is administered daily, Monday thru Friday, over a period of a few weeks.  Common side effects of radiation include fatigue and sunburn-like skin changes.  I would like to see her back upon completion of radiation to initiate endocrine therapy.  My recommendation will for treatment with 5 years of anastrozole.  We reviewed the potential side effects of anastrozole including myalgias, arthralgias, hot flashes, vaginal dryness, mood disorder, and thinning of the bones.      2.  Osteopenia  DEXA bone density scan on 3/17/2022 with a lowest T-score of -2.0 and is consistent with osteopenia.  I recommend that she take vitamin D 1000 IUs daily and obtain 20-30 minutes of daily weight bearing activity.  In addition, upon starting endocrine therapy, I also recommend treatment with Zometa.  Zometa 4 mg IV once every 6 months for 2-3 years has been  shown to prevent bone loss on aromatase inhibitor therapy and also has been shown to reduce the risk of breast cancer bone metastasis in postmenopausal women with a h/o ER positive breast cancer.  We will plan to monitor a DEXA bone density scan once every 2 years while on anastrozole.    3.  Cancer Risk Management:  Breast Actionable panel was drawn 4/4/2022 and was negative for pathogenic mutation.    4.  Follow Up:  New patient visit with Dr. Sánchez in radiation oncology within 1-2 weeks. Return visit with me in mid-June.    35 minutes spent on the date of the encounter doing chart review, review of test results, interpretation of tests, patient visit and documentation.

## 2022-04-25 ENCOUNTER — ONCOLOGY VISIT (OUTPATIENT)
Dept: ONCOLOGY | Facility: CLINIC | Age: 67
End: 2022-04-25
Attending: INTERNAL MEDICINE
Payer: MEDICARE

## 2022-04-25 ENCOUNTER — PATIENT OUTREACH (OUTPATIENT)
Dept: ONCOLOGY | Facility: CLINIC | Age: 67
End: 2022-04-25

## 2022-04-25 VITALS
BODY MASS INDEX: 22.47 KG/M2 | WEIGHT: 135 LBS | OXYGEN SATURATION: 100 % | HEART RATE: 69 BPM | SYSTOLIC BLOOD PRESSURE: 132 MMHG | DIASTOLIC BLOOD PRESSURE: 87 MMHG | TEMPERATURE: 97.9 F

## 2022-04-25 DIAGNOSIS — C50.411 MALIGNANT NEOPLASM OF UPPER-OUTER QUADRANT OF RIGHT BREAST IN FEMALE, ESTROGEN RECEPTOR POSITIVE (H): Primary | ICD-10-CM

## 2022-04-25 DIAGNOSIS — M85.89 OSTEOPENIA OF MULTIPLE SITES: ICD-10-CM

## 2022-04-25 DIAGNOSIS — Z17.0 MALIGNANT NEOPLASM OF UPPER-OUTER QUADRANT OF RIGHT BREAST IN FEMALE, ESTROGEN RECEPTOR POSITIVE (H): Primary | ICD-10-CM

## 2022-04-25 PROCEDURE — G0463 HOSPITAL OUTPT CLINIC VISIT: HCPCS

## 2022-04-25 PROCEDURE — 99214 OFFICE O/P EST MOD 30 MIN: CPT | Performed by: INTERNAL MEDICINE

## 2022-04-25 ASSESSMENT — PAIN SCALES - GENERAL: PAINLEVEL: NO PAIN (0)

## 2022-04-25 NOTE — PROGRESS NOTES
[facility]: Cancer Care Short Note                                                                                          Oncotype submitted        Bria Garces MSN, RN, OCN   RN Care Coordinator   [facility]

## 2022-04-25 NOTE — NURSING NOTE
"Oncology Rooming Note    April 25, 2022 9:21 AM   Elisha Gao is a 67 year old female who presents for:    Chief Complaint   Patient presents with     Oncology Clinic Visit     Breast Cancer     Initial Vitals: /87   Pulse 69   Temp 97.9  F (36.6  C) (Oral)   Wt 61.2 kg (135 lb)   SpO2 100%   BMI 22.47 kg/m   Estimated body mass index is 22.47 kg/m  as calculated from the following:    Height as of 4/11/22: 1.651 m (5' 5\").    Weight as of this encounter: 61.2 kg (135 lb). Body surface area is 1.68 meters squared.  No Pain (0) Comment: Data Unavailable   No LMP recorded. Patient is postmenopausal.  Allergies reviewed: Yes  Medications reviewed: Yes    Medications: Medication refills not needed today.  Pharmacy name entered into [employer]:    [facility] [address] [place] [address] - [address] [place] [address] PHARMACY [place], [place] - [address] [facility] [phone]    Clinical concerns: none       Anahi Martinez CMA            "

## 2022-04-25 NOTE — LETTER
4/25/2022         RE: Elisha Gao  [address] [facility] [address]        Dear Colleague,    Thank you for referring your patient, Elisha Gao, to the [facility]. Please see a copy of my visit note below.    Oncology Visit:  Date on this visit: 4/25/2022    Diagnosis: right breast cancer.    Primary Physician: Debi Pizarro     History Of Present Illness:  Ms. Gao is a 66 year old female with right breast cancer.  Routine screening mammogram on 3/17/2022 showed an asymmetry in the upper outer right breast.  Diagnostic breast imaging confirmed a 4 mm spiculated mass at 11:00, 4 cm from the nipple of the right breast.  Biopsy was consistent with a grade 2 invasive ductal carcinoma, ER strong in 98%, MI moderate in 40%, and HER2 negative by FISH and atypical ductal hyperplasia.  Patient underwent right breast lumpectomy and right axillary sentinel lymph node biopsy under the care of Dr. Garcia on 4/11/2022.  Pathology showed a grade 2 invasive ductal carcinoma measuring 9 mm with associated low grade DCIS.  Surgical margins were negative.  There was no lymphovascular invasion and two sentinel lymph nodes were benign.     Interval History:  Ms. Gao comes into clinic today for routine breast cancer follow-up.  Since her last visit, she underwent a right breast lumpectomy.  In general, the procedure went well for her.  She has resumed all of her daily activities.  She denies current breast pain, swelling, or lumps.  She has full range of motion of her right upper extremity.  She denies redness of her incision or fevers or chills.  She does not have any calf swelling, shortness of breath, or chest pain.  She reports some pain in the area of her axillary incision.  When clothing touches the area it is more tender.  It is not bothersome enough for her to have to take medication for it.  The remainder of a complete 12 point review of systems was reviewed with the patient  was negative with exception that mentioned above.    Past Medical/Surgical History:  Past Medical History:   Diagnosis Date     Chronic low back pain     for a couple years, worse at night, not evaluated     Depression      Invasive ductal carcinoma of breast, female, right (H)      Osteopenia     FRAX  11/2%   2022   Colonoscopy in 2012 w/o polyps, next due in 2022.    Past Surgical History:   Procedure Laterality Date     HYSTERECTOMY  1987    endometriosis, ovarian cysts, hormones x 10  years     LASIK Bilateral      LUMPECTOMY, BREAST, LOCALIZED USING RADIOFREQUENCY IDENTIFICATION Right 4/11/2022    Procedure: LUMPECTOMY, BREAST, LOCALIZED USING RADIOFREQUENCY IDENTIFICATION, sentinel lymph node biopsy;  Surgeon: Santy Garcia MD;  Location: [facility] OR     Allergies:  Allergies as of 04/25/2022 - Reviewed 04/11/2022   Allergen Reaction Noted     Alendronic acid Nausea and Vomiting 05/26/2010     Raloxifene Nausea 05/26/2010     Risedronate Nausea and Vomiting 05/26/2010     Current Medications:  Current Outpatient Medications   Medication Sig Dispense Refill     buPROPion (WELLBUTRIN XL) 300 MG 24 hr tablet Take 1 tablet (300 mg) by mouth every morning For additional refills, please schedule annual appointment at [phone] 90 tablet 3     citalopram (CELEXA) 40 MG tablet Take 1 tablet (40 mg) by mouth daily (Patient taking differently: Take 40 mg by mouth every morning) 90 tablet 3     magnesium 250 MG tablet Take 1 tablet by mouth every morning        Vitamin D, Cholecalciferol, 25 MCG (1000 UT) TABS Take 3 tablets by mouth every morning         Family and Social History:  See initial consultation dated 4/4/2022 for further details.    Physical Exam:  /87   Pulse 69   Temp 97.9  F (36.6  C) (Oral)   Wt 61.2 kg (135 lb)   SpO2 100%   BMI 22.47 kg/m    General:  Well appearing adult female in NAD.  Alert and oriented x 3.  HEENT:  Normocephalic.  Sclera anicteric.  MMM.  No lesions of the  oropharynx.  Breast:  Right axillary and right upper outer breast incisions are well healed and without erythema or edema.    Ext:  No pitting edema of the bilateral lower extremities.    Musculo:  Full ROM of the bilateral upper extremities.  Psych:  Mood and affect appear normal.    Laboratory/Imaging Studies  4/11/2022 right breast lumpectomy and right axillary sentinel lymph node biopsy:  - Grade 2 invasive ductal carcinoma measuring 9 mm.  - low grade DCIS  - No lymphovascular invasion  - surgical margins are negative for both invasive carcinoma and DCIS  - Two benign sentinel lymph nodes.    ASSESSMENT/PLAN:  67 yo female with stage Ia, [id number], grade 2, ER/[place] positive, HER-2 negative invasive ductal carcinoma of the right breast.   She is s/p right breast lumpectomy and right axillary sentinel lymph node procedure.    1.  Right breast cancer:  We reviewed the pathology from her right breast lumpectomy and sentinel lymph node procedure performed on 4/11/2022.  The pathology showed a 9 mm invasive cancer in the right breast.  Surgical margins were negative.  Two sentinel lymph nodes were negative for malignancy.    I recommend sending an Oncotype DX on the excision specimen.  We discussed this will take approximately two weeks to return.  In the meantime, I recommend she meet with Dr. Sánchez to discuss radiation.  We reviewed radiation is administered daily, Monday thru Friday, over a period of a few weeks.  Common side effects of radiation include fatigue and sunburn-like skin changes.  I would like to see her back upon completion of radiation to initiate endocrine therapy.  My recommendation will for treatment with 5 years of anastrozole.  We reviewed the potential side effects of anastrozole including myalgias, arthralgias, hot flashes, vaginal dryness, mood disorder, and thinning of the bones.      2.  Osteopenia  DEXA bone density scan on 3/17/2022 with a lowest T-score of -2.0 and is consistent with  osteopenia.  I recommend that she take vitamin D 1000 IUs daily and obtain 20-30 minutes of daily weight bearing activity.  In addition, upon starting endocrine therapy, I also recommend treatment with Zometa.  Zometa 4 mg IV once every 6 months for 2-3 years has been shown to prevent bone loss on aromatase inhibitor therapy and also has been shown to reduce the risk of breast cancer bone metastasis in postmenopausal women with a h/o ER positive breast cancer.  We will plan to monitor a DEXA bone density scan once every 2 years while on anastrozole.    3.  Cancer Risk Management:  Breast Actionable panel was drawn 4/4/2022 and was negative for pathogenic mutation.    4.  Follow Up:  New patient visit with Dr. Sánchez in radiation oncology within 1-2 weeks. Return visit with me in mid-June.    35 minutes spent on the date of the encounter doing chart review, review of test results, interpretation of tests, patient visit and documentation.         Again, thank you for allowing me to participate in the care of your patient.      Sincerely,    Gardenia Kaplan MD

## 2022-04-28 NOTE — PROGRESS NOTES
Department of Radiation Oncology                   [place] [address]  [address] [place]  [address]  Office:  [phone]  Fax:  [phone]   Radiation Oncology Clinic  [address] [place] [address]  Phone:  [phone]  Fax:  [phone]     RE: Elisha Gao : 1955   MRN: [id number] FRANCIA: May 4, 2022       OUTPATIENT VISIT NOTE         DIAGNOSIS: Invasive ductal carcinoma, Grade 2, ER+/AK+/HER2-christina nonamplified    STAGE: pT1bN0 Stage IA    REFERRING PROVIDER: Dr. Kaplan (Medical Oncology)    HISTORY OF PRESENT ILLNESS:  Ms. Lyon is a 67 year old woman from [place], [place] with a recent diagnosis of right sided breast cancer. Routine screening mammogram on 3/17/22 demonstrated a right breast asymmetry at 10 o'clock, middle depth, 5 cm from the nipple, a change from previous mammogram done in . Diagnostic mammogram confirmed a 4mm spiculated mass at 11 o'clock 4 cm from the nipple. On the contrast mammogram on 3/24/2022, the area of suspicion measured 7mm, with a second lesion 4 mm at 6 cm from the nipple at 11 o'clock. Both areas were biopsied. Pathology from 11:00 4 cm from the nipple showed ([id number]) Leslie Grade 2 invasive ductal carcinoma, ER 98%+/AK 40%+/HER2-christina negative by FISH. The 2nd lesion at 11:00 6 cm from the nipple was benign.     Breast actionable panel was drawn on 2022 and was negative for pathogenic mutations.  She saw Dr. Kaplan of Medical Oncology, who has recommended adjuvant anastrozole as part of her endocrine therapy, with Zometa given her DEXA scan and -2, and will decide on whether to send Oncotype Dx based on final pathology.    She was referred to surgical oncology, seeing Dr. Garcia on 2022, and after discussion, opted towards breast conserving surgery lymph node biopsy.  She proceeded to surgery on 2022. Surgical pathology demonstrated a 9 mm Grade 2 invasive ductal carcinoma. Negative for LVSI. Associated DCIS  was nuclear grade 1, 2 mm in greatest dimension. All invasive margins were clear, with the nearest being 7 mm from the posterior margin. All margins were clear of DCIS, with the closest being 9 mm. Both sentinel nodes were uninvolved. pT1bN0 (sn).      She tolerated surgery well.  She saw Dr. Garcia postoperatively on 2022, and has resumed all of her daily activities.  Has no range of motion issues or swelling.  Oncotype DX is pending.    She is referred to discuss adjuvant radiation therapy to the right breast as part of breast conservation management.  On interview today, Elisha is dong well. She has healed from surgery and has good range of motion.       PMH:     Chronic low back pain    Depression    Osteopenia -- T score - 2.0    Clavicle fracture after falling off of a horse    PSH:    Hysterectomy at age 36 for endometroisis     Lasik procedure to both eyes    S/p colonoscopy in     MEDICATIONS:    Bupropion    Citalopram    Magnesium    Vitamin D    ALLERGY:    Alendronic acid    Raloxifene    Risedronate    FAMILY HISTORY:    Mother with colon cancer at 75 years; lung cancer at age 90        SOCIAL HISTORY    Resides in [place], [place] with spouse, Prema    Retired from /NICU nurse    Former smoker, 1/2 pack daily for 20 years. Quit in     Alcohol use: 3 glasses of wine weekly    Actively golfing, horseback riding and camping     ECOG PERFORMANCE STATUS: 0    HISTORY OF RADIATION: None  IMPLANTED CARDIAC DEVICE: None  PREGNANCY RISK: None - hysterectomy 30 years ago    REVIEW OF SYMPTOMS:  A full 10-point review of systems was performed as per nursing note.  Pertinent negatives and positives reviewed.      PHYSICAL EXAMINATION:    Gen: Alert, in NAD  Eyes: EOMI, sclera anicteric  HENT      Head: NC/AT     Ears: No external auricular lesions     Nose/sinus: No rhinorrhea or epistaxis     Oral Cavity/Oropharynx: MMM, no thrush noted  Pulm: Breathing comfortably on room air, no audible  wheezes or ronchi  CV: Well-perfused, no cyanosis  Abdominal: Soft, nondistended  Skin: Normal color and turgor  Neurologic/MSK: motor grossly intact, normal gait  Psych: Appropriate mood and affect  BREASTS: deferred      RADIOLOGY/IMAGING:      ASSESSMENT AND PLAN:  Elisha is a 67 year old woman who presents with pT1bN0 (sn) grade 2 invasive ductal carcinoma of the right breast. She underwent right lumpectomy on 4/11/22. Her margins were negative for invasive disease and DCIS, and both sentinel lymph nodes were negative. Her Oncotype DX score was ordered and results are pending.     Elisha is here to discuss adjuvant radiation. We would recommend whole breast radiation therapy to the right breast for Elisha given that she is a very healthy 67 year old woman. We discussed that she is a candidate for either the hypofractionation, 4240 cGy in 16 daily fractions, versus a one week regimen, wherein the whole breast is treated to 2600 cGy in 5 daily fractions, otherwise known as the FAST-Forward regimen. We discussed that the primary difference between the two is uncertainty with long-term cosmetic effects (scarring/asymmetry) since the study follow up was about 5 years. She expressed interest in the FAST-Forward treatment regimen.     Given her clear margins and post-menopausal status, a boost to the lumpectomy cavity may be omitted.     We discussed short and long-term side effects of radiation with her. In particular, we discussed the possibility of shrinkage or asymmetry in the breast as a long-term side effect of radiation. She expressed understanding with everything that we discussed.     We discussed that we are still awaiting her Oncotype DX score to determine whether she needs chemotherapy, and if she does need chemotherapy, she will undergo radiation therapy after chemotherapy is completed. We suspect the likelihood of her needing chemo is small, and she is interested in completing radiation therapy as soon as  possible. We scheduled her for simulation later this week (5/6/22) while we await the Oncotype DX results.     Thank you for allowing us to participate in this patient's care.  Please feel free to call with any questions or concerns.    The patient was seen and assessed with staff, Dr. Sánchez, who agrees with the above assessment and plan.      Altagracia Hess  Medical Student  [facility]     Jeanine Morrison MD PGY3  Department of Radiation Oncology  [phone] Clinic  [phone] Pager           I saw and examined the patient with the resident and the student  I have reviewed and edited the resident's note and agree with the plan of care.      I reviewed patient's chart, internal/external medical records, imaging studies (including actual images), labs and pathology reports.  I interviewed and counseled the patient face to face.       80 minutes were spent on the date of the encounter doing chart review, history and exam, documentation and further activities as noted above.           Anny Sánchez MD

## 2022-04-29 ENCOUNTER — ONCOLOGY VISIT (OUTPATIENT)
Dept: ONCOLOGY | Facility: CLINIC | Age: 67
End: 2022-04-29
Attending: SURGERY
Payer: MEDICARE

## 2022-04-29 VITALS
SYSTOLIC BLOOD PRESSURE: 119 MMHG | TEMPERATURE: 98.4 F | DIASTOLIC BLOOD PRESSURE: 72 MMHG | OXYGEN SATURATION: 99 % | BODY MASS INDEX: 22.55 KG/M2 | RESPIRATION RATE: 18 BRPM | WEIGHT: 135.5 LBS | HEART RATE: 75 BPM

## 2022-04-29 DIAGNOSIS — C50.411 MALIGNANT NEOPLASM OF UPPER-OUTER QUADRANT OF RIGHT BREAST IN FEMALE, ESTROGEN RECEPTOR POSITIVE (H): Primary | ICD-10-CM

## 2022-04-29 DIAGNOSIS — Z17.0 MALIGNANT NEOPLASM OF UPPER-OUTER QUADRANT OF RIGHT BREAST IN FEMALE, ESTROGEN RECEPTOR POSITIVE (H): Primary | ICD-10-CM

## 2022-04-29 PROCEDURE — G0463 HOSPITAL OUTPT CLINIC VISIT: HCPCS

## 2022-04-29 ASSESSMENT — PAIN SCALES - GENERAL: PAINLEVEL: NO PAIN (0)

## 2022-04-29 NOTE — NURSING NOTE
"Oncology Rooming Note    April 29, 2022 10:02 AM   Elisha Gao is a 67 year old female who presents for:    Chief Complaint   Patient presents with     Oncology Clinic Visit     BREAST CANCER        Initial Vitals: /72   Pulse 75   Temp 98.4  F (36.9  C) (Oral)   Resp 18   Wt 61.5 kg (135 lb 8 oz)   SpO2 99%   BMI 22.55 kg/m   Estimated body mass index is 22.55 kg/m  as calculated from the following:    Height as of 4/11/22: 1.651 m (5' 5\").    Weight as of this encounter: 61.5 kg (135 lb 8 oz). Body surface area is 1.68 meters squared.  No Pain (0) Comment: Data Unavailable   No LMP recorded. Patient is postmenopausal.  Allergies reviewed: Yes  Medications reviewed: Yes    Medications: Medication refills not needed today.  Pharmacy name entered into [employer]:    [facility] [address] [place] [address] PHARMACY [place], [place] - [address] [facility] [phone]    Clinical concerns: No new concerns.       Mare Florez CMA            "

## 2022-04-29 NOTE — LETTER
4/29/2022         RE: Elisha Gao  [address]  [facility] [address]        Dear Colleague,    Thank you for referring your patient, Elisha Gao, to the [facility] BREAST [facility]. Please see a copy of my visit note below.    HISTORY OF PRESENT ILLNESS:  Today, I had a postoperative visit with Elisha Gao after she underwent a lumpectomy and sentinel lymph node biopsy on 04/11.  Her final pathology report revealed a 9 mm grade 2 invasive ductal cancer.  The margins are negative.  She is an estrogen-receptor positive, progesterone-receptor positive, HER2/christina negative breast cancer.  Her sentinel node was negative.  She has an Oncotype DX score pending.  Her genetic testing results were negative.  She has a consult with Radiation Oncology.    PHYSICAL EXAMINATION:  Her incisions are healing well with no evidence of infection.    IMPRESSION:  Postoperative check.    PLAN:  I will see her in the future if any problems arise.          Again, thank you for allowing me to participate in the care of your patient.        Sincerely,        Santy Garcia MD

## 2022-04-29 NOTE — PROGRESS NOTES
HISTORY OF PRESENT ILLNESS:  Today, I had a postoperative visit with Elisha Gao after she underwent a lumpectomy and sentinel lymph node biopsy on 04/11.  Her final pathology report revealed a 9 mm grade 2 invasive ductal cancer.  The margins are negative.  She is an estrogen-receptor positive, progesterone-receptor positive, HER2/christina negative breast cancer.  Her sentinel node was negative.  She has an Oncotype DX score pending.  Her genetic testing results were negative.  She has a consult with Radiation Oncology.    PHYSICAL EXAMINATION:  Her incisions are healing well with no evidence of infection.    IMPRESSION:  Postoperative check.    PLAN:  I will see her in the future if any problems arise.

## 2022-05-04 ENCOUNTER — OFFICE VISIT (OUTPATIENT)
Dept: RADIATION ONCOLOGY | Facility: CLINIC | Age: 67
End: 2022-05-04
Attending: INTERNAL MEDICINE
Payer: MEDICARE

## 2022-05-04 VITALS
HEART RATE: 71 BPM | SYSTOLIC BLOOD PRESSURE: 143 MMHG | DIASTOLIC BLOOD PRESSURE: 86 MMHG | WEIGHT: 135.8 LBS | BODY MASS INDEX: 22.6 KG/M2 | OXYGEN SATURATION: 99 %

## 2022-05-04 DIAGNOSIS — Z17.0 MALIGNANT NEOPLASM OF UPPER-OUTER QUADRANT OF RIGHT BREAST IN FEMALE, ESTROGEN RECEPTOR POSITIVE (H): ICD-10-CM

## 2022-05-04 DIAGNOSIS — C50.411 MALIGNANT NEOPLASM OF UPPER-OUTER QUADRANT OF RIGHT BREAST IN FEMALE, ESTROGEN RECEPTOR POSITIVE (H): ICD-10-CM

## 2022-05-04 PROCEDURE — G0463 HOSPITAL OUTPT CLINIC VISIT: HCPCS | Performed by: RADIOLOGY

## 2022-05-04 ASSESSMENT — ENCOUNTER SYMPTOMS
FALLS: 0
TINGLING: 0
DIARRHEA: 0
BLOOD IN STOOL: 0
WEIGHT LOSS: 0
HEADACHES: 0
EYE PAIN: 0
DIZZINESS: 0
NECK PAIN: 0
NAUSEA: 0
NERVOUS/ANXIOUS: 0
BLURRED VISION: 0
FREQUENCY: 0
SHORTNESS OF BREATH: 0
DEPRESSION: 1
SEIZURES: 0
HEMATURIA: 0
CHILLS: 0
VOMITING: 0
DIAPHORESIS: 0
BACK PAIN: 0
BRUISES/BLEEDS EASILY: 0
COUGH: 0
CONSTIPATION: 0
DOUBLE VISION: 0
SORE THROAT: 0
FEVER: 0
INSOMNIA: 0

## 2022-05-04 NOTE — PROGRESS NOTES
HPI    INITIAL PATIENT ASSESSMENT    Diagnosis: Breast cancer, Rt lumpectomy 4/11/22    Prior radiation therapy: None    Prior chemotherapy: None    Prior hormonal therapy:Yes: after hysterectomy 10 yrs    Pain Eval:  Denies    Psychosocial  Living arrangements: wife  Fall Risk: independent   referral needs: Not needed    Advanced Directive: Yes - Location: at home  Implantable Cardiac Device? No    Onset of menarche: @ age 12  LMP: No LMP recorded. Patient is postmenopausal.  Onset of menopause: Hysterectomy age 32  Abnormal vaginal bleeding/discharge: No  Are you pregnant? No  Reproductive note: No children    Nurse face-to-face time: Level 4:  15 min face to face time    Review of Systems   Constitutional: Negative for chills, diaphoresis, fever, malaise/fatigue and weight loss.   HENT: Negative for ear pain, nosebleeds and sore throat.    Eyes: Negative for blurred vision, double vision and pain.   Respiratory: Negative for cough and shortness of breath.    Cardiovascular: Negative for chest pain and leg swelling.   Gastrointestinal: Negative for blood in stool, constipation, diarrhea, nausea and vomiting.   Genitourinary: Negative for frequency, hematuria and urgency.   Musculoskeletal: Negative for back pain, falls, joint pain and neck pain.   Skin: Negative for rash.   Neurological: Negative for dizziness, tingling, seizures and headaches.   Endo/Heme/Allergies: Does not bruise/bleed easily.   Psychiatric/Behavioral: Positive for depression (Well controlled with medication). The patient is not nervous/anxious and does not have insomnia.

## 2022-05-04 NOTE — LETTER
2022         RE: Elisha Gao  [address] [facility] [address]        Dear Colleague,    Thank you for referring your patient, Elisha Gao, to the [facility] RADIATION ONCOLOGY. Please see a copy of my visit note below.    Department of Radiation Oncology                   [place] [address]  [address] [place]  [address]  Office:  [phone]  Fax:  [phone]   Radiation Oncology Clinic  [address] [place] [address]  Phone:  [phone]  Fax:  [phone]     RE: Elisha Gao : 1955   MRN: [id number] FRANCIA: May 4, 2022       OUTPATIENT VISIT NOTE         DIAGNOSIS: Invasive ductal carcinoma, Grade 2, ER+/CA+/HER2-christina nonamplified    STAGE: pT1bN0 Stage IA    REFERRING PROVIDER: Dr. Kaplan (Medical Oncology)    HISTORY OF PRESENT ILLNESS:  Ms. Lyon is a 67 year old woman from [place], [place] with a recent diagnosis of right sided breast cancer. Routine screening mammogram on 3/17/22 demonstrated a right breast asymmetry at 10 o'clock, middle depth, 5 cm from the nipple, a change from previous mammogram done in . Diagnostic mammogram confirmed a 4mm spiculated mass at 11 o'clock 4 cm from the nipple. On the contrast mammogram on 3/24/2022, the area of suspicion measured 7mm, with a second lesion 4 mm at 6 cm from the nipple at 11 o'clock. Both areas were biopsied. Pathology from 11:00 4 cm from the nipple showed ([id number]) Leslie Grade 2 invasive ductal carcinoma, ER 98%+/CA 40%+/HER2-christina negative by FISH. The 2nd lesion at 11:00 6 cm from the nipple was benign.     Breast actionable panel was drawn on 2022 and was negative for pathogenic mutations.  She saw Dr. Kaplan of Medical Oncology, who has recommended adjuvant anastrozole as part of her endocrine therapy, with Zometa given her DEXA scan and -2, and will decide on whether to send Oncotype Dx based on final pathology.    She was referred to surgical oncology,  seeing Dr. Garcia on 2022, and after discussion, opted towards breast conserving surgery lymph node biopsy.  She proceeded to surgery on 2022. Surgical pathology demonstrated a 9 mm Grade 2 invasive ductal carcinoma. Negative for LVSI. Associated DCIS was nuclear grade 1, 2 mm in greatest dimension. All invasive margins were clear, with the nearest being 7 mm from the posterior margin. All margins were clear of DCIS, with the closest being 9 mm. Both sentinel nodes were uninvolved. pT1bN0 (sn).      She tolerated surgery well.  She saw Dr. Garcia postoperatively on 2022, and has resumed all of her daily activities.  Has no range of motion issues or swelling.  Oncotype DX is pending.    She is referred to discuss adjuvant radiation therapy to the right breast as part of breast conservation management.  On interview today, Elisha is dong well. She has healed from surgery and has good range of motion.       PMH:     Chronic low back pain    Depression    Osteopenia -- T score - 2.0    Clavicle fracture after falling off of a horse    PSH:    Hysterectomy at age 36 for endometroisis     Lasik procedure to both eyes    S/p colonoscopy in     MEDICATIONS:    Bupropion    Citalopram    Magnesium    Vitamin D    ALLERGY:    Alendronic acid    Raloxifene    Risedronate    FAMILY HISTORY:    Mother with colon cancer at 75 years; lung cancer at age 90        SOCIAL HISTORY    Resides in [place], [place] with spouse, Prema    Retired from [employer] nurse    Former smoker, 1/2 pack daily for 20 years. Quit in     Alcohol use: 3 glasses of wine weekly    Actively golfing, horseback riding and camping     ECOG PERFORMANCE STATUS: 0    HISTORY OF RADIATION: None  IMPLANTED CARDIAC DEVICE: None  PREGNANCY RISK: None - hysterectomy 30 years ago    REVIEW OF SYMPTOMS:  A full 10-point review of systems was performed as per nursing note.  Pertinent negatives and positives reviewed.      PHYSICAL EXAMINATION:     Gen: Alert, in NAD  Eyes: EOMI, sclera anicteric  HENT      Head: NC/AT     Ears: No external auricular lesions     Nose/sinus: No rhinorrhea or epistaxis     Oral Cavity/Oropharynx: MMM, no thrush noted  Pulm: Breathing comfortably on room air, no audible wheezes or ronchi  CV: Well-perfused, no cyanosis  Abdominal: Soft, nondistended  Skin: Normal color and turgor  Neurologic/MSK: motor grossly intact, normal gait  Psych: Appropriate mood and affect  BREASTS: deferred      RADIOLOGY/IMAGING:      ASSESSMENT AND PLAN:  Elisha is a 67 year old woman who presents with pT1bN0 (sn) grade 2 invasive ductal carcinoma of the right breast. She underwent right lumpectomy on 4/11/22. Her margins were negative for invasive disease and DCIS, and both sentinel lymph nodes were negative. Her Oncotype DX score was ordered and results are pending.     Elisha is here to discuss adjuvant radiation. We would recommend whole breast radiation therapy to the right breast for Elisha given that she is a very healthy 67 year old woman. We discussed that she is a candidate for either the hypofractionation, 4240 cGy in 16 daily fractions, versus a one week regimen, wherein the whole breast is treated to 2600 cGy in 5 daily fractions, otherwise known as the FAST-Forward regimen. We discussed that the primary difference between the two is uncertainty with long-term cosmetic effects (scarring/asymmetry) since the study follow up was about 5 years. She expressed interest in the FAST-Forward treatment regimen.     Given her clear margins and post-menopausal status, a boost to the lumpectomy cavity may be omitted.     We discussed short and long-term side effects of radiation with her. In particular, we discussed the possibility of shrinkage or asymmetry in the breast as a long-term side effect of radiation. She expressed understanding with everything that we discussed.     We discussed that we are still awaiting her Oncotype DX score to determine  whether she needs chemotherapy, and if she does need chemotherapy, she will undergo radiation therapy after chemotherapy is completed. We suspect the likelihood of her needing chemo is small, and she is interested in completing radiation therapy as soon as possible. We scheduled her for simulation later this week (5/6/22) while we await the Oncotype DX results.     Thank you for allowing us to participate in this patient's care.  Please feel free to call with any questions or concerns.    The patient was seen and assessed with staff, Dr. Sánchez, who agrees with the above assessment and plan.      Altagracia Hess  Medical Student  [facility]     Jeanine Morrison MD PGY3  Department of Radiation Oncology  [phone] Clinic  [phone] Pager           I saw and examined the patient with the resident and the student  I have reviewed and edited the resident's note and agree with the plan of care.      I reviewed patient's chart, internal/external medical records, imaging studies (including actual images), labs and pathology reports.  I interviewed and counseled the patient face to face.       80 minutes were spent on the date of the encounter doing chart review, history and exam, documentation and further activities as noted above.           Anny Sánchez MD        HPI    INITIAL PATIENT ASSESSMENT    Diagnosis: Breast cancer, Rt lumpectomy 4/11/22    Prior radiation therapy: None    Prior chemotherapy: None    Prior hormonal therapy:Yes: after hysterectomy 10 yrs    Pain Eval:  Denies    Psychosocial  Living arrangements: wife  Fall Risk: independent   referral needs: Not needed    Advanced Directive: Yes - Location: at home  Implantable Cardiac Device? No    Onset of menarche: @ age 12  LMP: No LMP recorded. Patient is postmenopausal.  Onset of menopause: Hysterectomy age 32  Abnormal vaginal bleeding/discharge: No  Are you pregnant? No  Reproductive note: No children    Nurse  face-to-face time: Level 4:  15 min face to face time    Review of Systems   Constitutional: Negative for chills, diaphoresis, fever, malaise/fatigue and weight loss.   HENT: Negative for ear pain, nosebleeds and sore throat.    Eyes: Negative for blurred vision, double vision and pain.   Respiratory: Negative for cough and shortness of breath.    Cardiovascular: Negative for chest pain and leg swelling.   Gastrointestinal: Negative for blood in stool, constipation, diarrhea, nausea and vomiting.   Genitourinary: Negative for frequency, hematuria and urgency.   Musculoskeletal: Negative for back pain, falls, joint pain and neck pain.   Skin: Negative for rash.   Neurological: Negative for dizziness, tingling, seizures and headaches.   Endo/Heme/Allergies: Does not bruise/bleed easily.   Psychiatric/Behavioral: Positive for depression (Well controlled with medication). The patient is not nervous/anxious and does not have insomnia.          Again, thank you for allowing me to participate in the care of your patient.        Sincerely,        Anny Sánchez MD

## 2022-05-06 ENCOUNTER — OFFICE VISIT (OUTPATIENT)
Dept: RADIATION ONCOLOGY | Facility: CLINIC | Age: 67
End: 2022-05-06
Attending: RADIOLOGY
Payer: MEDICARE

## 2022-05-06 DIAGNOSIS — C50.411 MALIGNANT NEOPLASM OF UPPER-OUTER QUADRANT OF RIGHT BREAST IN FEMALE, ESTROGEN RECEPTOR POSITIVE (H): Primary | ICD-10-CM

## 2022-05-06 DIAGNOSIS — Z17.0 MALIGNANT NEOPLASM OF UPPER-OUTER QUADRANT OF RIGHT BREAST IN FEMALE, ESTROGEN RECEPTOR POSITIVE (H): Primary | ICD-10-CM

## 2022-05-06 LAB — SPECIMEN STATUS: NORMAL

## 2022-05-06 PROCEDURE — 77334 RADIATION TREATMENT AID(S): CPT | Mod: 26 | Performed by: RADIOLOGY

## 2022-05-06 PROCEDURE — 77290 THER RAD SIMULAJ FIELD CPLX: CPT | Performed by: RADIOLOGY

## 2022-05-06 PROCEDURE — 77290 THER RAD SIMULAJ FIELD CPLX: CPT | Mod: 26 | Performed by: RADIOLOGY

## 2022-05-06 PROCEDURE — 77334 RADIATION TREATMENT AID(S): CPT | Performed by: RADIOLOGY

## 2022-05-06 NOTE — LETTER
5/6/2022         RE: Elisha Gao  [address]  [facility] [address]        Dear Colleague,    Thank you for referring your patient, Elisha Gao, to the [facility] RADIATION ONCOLOGY. Please see a copy of my visit note below.    Radiation Therapy Patient Education        Person involved with teaching: Patient    Patient educational needs for self management of treatment-related side effects assessment completed.  EPIC Patient Ed tab contains Patient Learning Assessment    Education Materials Given  Skin Care During Radiation Treatment and Simulation     Educational Topics Discussed  Side effects expected, Skin care, Activity and When to call MD/RN    Response To Teaching  Verbalizes understanding    GYN Only  Vaginal Dilator-given and educated: not given    Referrals sent: None    Chemotherapy?  No          Again, thank you for allowing me to participate in the care of your patient.        Sincerely,        Anny Sánchez MD

## 2022-05-06 NOTE — PROGRESS NOTES
Radiation Therapy Patient Education        Person involved with teaching: Patient    Patient educational needs for self management of treatment-related side effects assessment completed.  [facility] Patient Ed tab contains Patient Learning Assessment    Education Materials Given  Skin Care During Radiation Treatment and Simulation     Educational Topics Discussed  Side effects expected, Skin care, Activity and When to call MD/RN    Response To Teaching  Verbalizes understanding    GYN Only  Vaginal Dilator-given and educated: not given    Referrals sent: None    Chemotherapy?  No

## 2022-05-18 ENCOUNTER — APPOINTMENT (OUTPATIENT)
Dept: RADIATION ONCOLOGY | Facility: CLINIC | Age: 67
End: 2022-05-18
Attending: RADIOLOGY
Payer: MEDICARE

## 2022-05-18 PROCEDURE — 77280 THER RAD SIMULAJ FIELD SMPL: CPT | Performed by: RADIOLOGY

## 2022-05-18 PROCEDURE — 77280 THER RAD SIMULAJ FIELD SMPL: CPT | Mod: 26 | Performed by: RADIOLOGY

## 2022-05-19 ENCOUNTER — APPOINTMENT (OUTPATIENT)
Dept: RADIATION ONCOLOGY | Facility: CLINIC | Age: 67
End: 2022-05-19
Attending: RADIOLOGY
Payer: MEDICARE

## 2022-05-19 PROCEDURE — 77387 GUIDANCE FOR RADJ TX DLVR: CPT | Performed by: RADIOLOGY

## 2022-05-19 PROCEDURE — 77412 RADIATION TX DELIVERY LVL 3: CPT | Performed by: RADIOLOGY

## 2022-05-20 ENCOUNTER — APPOINTMENT (OUTPATIENT)
Dept: RADIATION ONCOLOGY | Facility: CLINIC | Age: 67
End: 2022-05-20
Attending: RADIOLOGY
Payer: MEDICARE

## 2022-05-20 PROCEDURE — 77387 GUIDANCE FOR RADJ TX DLVR: CPT | Performed by: RADIOLOGY

## 2022-05-20 PROCEDURE — 77412 RADIATION TX DELIVERY LVL 3: CPT | Performed by: RADIOLOGY

## 2022-05-23 ENCOUNTER — OFFICE VISIT (OUTPATIENT)
Dept: RADIATION ONCOLOGY | Facility: CLINIC | Age: 67
End: 2022-05-23
Attending: RADIOLOGY
Payer: MEDICARE

## 2022-05-23 VITALS
HEART RATE: 74 BPM | DIASTOLIC BLOOD PRESSURE: 82 MMHG | SYSTOLIC BLOOD PRESSURE: 121 MMHG | OXYGEN SATURATION: 99 % | BODY MASS INDEX: 21.97 KG/M2 | WEIGHT: 132 LBS

## 2022-05-23 DIAGNOSIS — C50.411 MALIGNANT NEOPLASM OF UPPER-OUTER QUADRANT OF RIGHT BREAST IN FEMALE, ESTROGEN RECEPTOR POSITIVE (H): Primary | ICD-10-CM

## 2022-05-23 DIAGNOSIS — Z17.0 MALIGNANT NEOPLASM OF UPPER-OUTER QUADRANT OF RIGHT BREAST IN FEMALE, ESTROGEN RECEPTOR POSITIVE (H): Primary | ICD-10-CM

## 2022-05-23 PROCEDURE — 77412 RADIATION TX DELIVERY LVL 3: CPT | Performed by: RADIOLOGY

## 2022-05-23 PROCEDURE — 77014 PR CT GUIDE FOR PLACEMENT RADIATION THERAPY FIELDS: CPT | Mod: 26 | Performed by: RADIOLOGY

## 2022-05-23 PROCEDURE — 77387 GUIDANCE FOR RADJ TX DLVR: CPT | Performed by: RADIOLOGY

## 2022-05-23 NOTE — PATIENT INSTRUCTIONS
Continuing Management of the Effects of Radiation Treatment    The side effects of radiation therapy should gradually decrease in 2 to 3 weeks after you have finished radiation.  Some effects take longer to resolve.    Skin reactions:  Skin changes (such as redness or irritation) should begin to get better gradually.  Some people will have a permanent change in skin color.  Their skin may be more pink or  tan  than the untreated skin.  The skin may be thinner or more fragile than before treatment.  Continue to use a gentle moisturizing lotion for several months.  You should always protect the skin in the area that was treated by using sunscreen of spf 30 or higher.      Other skin care instructions:    Fatigue caused by radiation therapy will decrease and your energy will improve.    For concerns or questions call Department of Therapeutic Radiology [phone]

## 2022-05-23 NOTE — LETTER
2022         RE: Elisha Gao  [address] [facility] [address]        Dear Colleague,    Thank you for referring your patient, Elisha Gao, to the [facility] RADIATION ONCOLOGY. Please see a copy of my visit note below.    [facility] PHYSICIANS  SPECIALIZING IN BREAKTHROUGHS  Radiation Oncology    On Treatment Visit Note      Elisha Gao      Date: 2022   MRN: [id number]   : 1955  Diagnosis: Breast cancer      Reason for Visit:  On Radiation Treatment Visit     Treatment Summary to Date  Treatment Site: Rt Breast Current Dose: 1060/2600 cGy Fractions: 3/5      Chemotherapy  Chemo concurrent with radx?: No    ED Visit/Hosiptal Admission: None    Treatment Breaks: None      Subjective:   Elisha tolerated the first 3 treatments well. She denies skin irritation. She notes no change in her energy level.     Nursing ROS:   Nutrition Alteration  Diet Type: Patient's Preference  Nutrition Note: appetite good  Skin  Skin Reaction: 1 - Faint erythema or dry desquamation  Skin Note: Using aquaphor        Cardiovascular  Respiratory effort: 1 - Normal - without distress        Psychosocial  Psychosocial Note: feeling well  Pain Assessment  0-10 Pain Scale: 0      Objective:   /82   Pulse 74   Wt 59.9 kg (132 lb)   SpO2 99%   BMI 21.97 kg/m    Gen: Appears well, in no acute distress  Skin: No erythema    Labs:  CBC RESULTS: Recent Labs   Lab Test 22  1018   WBC 5.0   RBC 4.76   HGB 14.6   HCT 43.9   MCV 92   MCH 30.7   MCHC 33.3   RDW 13.0        ELECTROLYTES:  Recent Labs   Lab Test 22  0842      POTASSIUM 4.3   CHLORIDE 108   JOSE 9.0   CO2 26   BUN 20   CR 0.92   GLC 96       Assessment:    Tolerating radiation therapy well.  All questions and concerns addressed.    Toxicities:  Fatigue: Grade 0: No toxicity  Dermatitis: Grade 0: No toxicity    Plan:   Continue current therapy.    She will finish treatment in 2 more fractions  this Friday.  Discussed skin care.  Follow up with me in 1 month.    Mosaiq chart and setup information reviewed  Ports checked    Medication Review  Med Note: No changes per pt    Educational Topic Discussed  Education Instructions: D/C instructions reviewed        Anny Sánchez MD/PhD  [phone] clinic  Pager [phone]    Please do not send letter to referring physician.          15 minutes were spent on the date of the encounter doing chart review, history and exam, documentation and further activities as noted above.           Anny Sánchez MD      Again, thank you for allowing me to participate in the care of your patient.        Sincerely,        Anny Sánchez MD

## 2022-05-23 NOTE — LETTER
Date:May 25, 2022      Patient was self referred, no letter generated. Do not send.        [facility] Health Information

## 2022-05-24 ENCOUNTER — APPOINTMENT (OUTPATIENT)
Dept: RADIATION ONCOLOGY | Facility: CLINIC | Age: 67
End: 2022-05-24
Attending: RADIOLOGY
Payer: MEDICARE

## 2022-05-24 PROCEDURE — 77412 RADIATION TX DELIVERY LVL 3: CPT | Performed by: RADIOLOGY

## 2022-05-24 PROCEDURE — 77387 GUIDANCE FOR RADJ TX DLVR: CPT | Performed by: RADIOLOGY

## 2022-05-24 PROCEDURE — 77014 PR CT GUIDE FOR PLACEMENT RADIATION THERAPY FIELDS: CPT | Mod: 26 | Performed by: RADIOLOGY

## 2022-05-24 NOTE — PROGRESS NOTES
[facility] PHYSICIANS  SPECIALIZING IN BREAKTHROUGHS  Radiation Oncology    On Treatment Visit Note      Elisha Gao      Date: 2022   MRN: [id number]   : 1955  Diagnosis: Breast cancer      Reason for Visit:  On Radiation Treatment Visit     Treatment Summary to Date  Treatment Site: Rt Breast Current Dose: 1060/2600 cGy Fractions: 3/5      Chemotherapy  Chemo concurrent with radx?: No    ED Visit/Hosiptal Admission: None    Treatment Breaks: None      Subjective:   Elisha tolerated the first 3 treatments well. She denies skin irritation. She notes no change in her energy level.     Nursing ROS:   Nutrition Alteration  Diet Type: Patient's Preference  Nutrition Note: appetite good  Skin  Skin Reaction: 1 - Faint erythema or dry desquamation  Skin Note: Using aquaphor        Cardiovascular  Respiratory effort: 1 - Normal - without distress        Psychosocial  Psychosocial Note: feeling well  Pain Assessment  0-10 Pain Scale: 0      Objective:   /82   Pulse 74   Wt 59.9 kg (132 lb)   SpO2 99%   BMI 21.97 kg/m    Gen: Appears well, in no acute distress  Skin: No erythema    Labs:  CBC RESULTS: Recent Labs   Lab Test 22  1018   WBC 5.0   RBC 4.76   HGB 14.6   HCT 43.9   MCV 92   MCH 30.7   MCHC 33.3   RDW 13.0        ELECTROLYTES:  Recent Labs   Lab Test 22  0842      POTASSIUM 4.3   CHLORIDE 108   JOSE 9.0   CO2 26   BUN 20   CR 0.92   GLC 96       Assessment:    Tolerating radiation therapy well.  All questions and concerns addressed.    Toxicities:  Fatigue: Grade 0: No toxicity  Dermatitis: Grade 0: No toxicity    Plan:   Continue current therapy.    She will finish treatment in 2 more fractions this Friday.  Discussed skin care.  Follow up with me in 1 month.    Mosaiq chart and setup information reviewed  Ports checked    Medication Review  Med Note: No changes per pt    Educational Topic Discussed  Education Instructions: D/C instructions  reviewed        Anny Sánchez MD/PhD  [phone] clinic  Pager [phone]    Please do not send letter to referring physician.          15 minutes were spent on the date of the encounter doing chart review, history and exam, documentation and further activities as noted above.           Anny Sánchez MD

## 2022-05-25 ENCOUNTER — APPOINTMENT (OUTPATIENT)
Dept: RADIATION ONCOLOGY | Facility: CLINIC | Age: 67
End: 2022-05-25
Attending: RADIOLOGY
Payer: MEDICARE

## 2022-05-25 PROCEDURE — 77336 RADIATION PHYSICS CONSULT: CPT | Performed by: RADIOLOGY

## 2022-05-25 PROCEDURE — 77387 GUIDANCE FOR RADJ TX DLVR: CPT | Performed by: RADIOLOGY

## 2022-05-25 PROCEDURE — 77412 RADIATION TX DELIVERY LVL 3: CPT | Performed by: RADIOLOGY

## 2022-05-25 PROCEDURE — 77427 RADIATION TX MANAGEMENT X5: CPT | Performed by: RADIOLOGY

## 2022-05-25 PROCEDURE — G6002 STEREOSCOPIC X-RAY GUIDANCE: HCPCS | Mod: 26 | Performed by: RADIOLOGY

## 2022-05-26 ENCOUNTER — ONCOLOGY VISIT (OUTPATIENT)
Dept: RADIATION ONCOLOGY | Facility: CLINIC | Age: 67
End: 2022-05-26

## 2022-05-27 NOTE — PROCEDURES
Radiotherapy Treatment Summary          Date of Report: May 26, 2022     PATIENT: NAHUM CHOI  MEDICAL RECORD NO: [id number]  : 1955     DIAGNOSIS: C50.411 Malignant neoplasm of upper-outer quadrant of right female breast  INTENT OF RADIOTHERAPY: Cure  PATHOLOGY:  Invasive ductal carcinoma, [place] grade 2, ER+/NC+/HER2-                             STAGE: pT1bN0(sn)  CONCURRENT SYSTEMIC THERAPY:       None             Details of the treatments summarized below are found in records kept in the Department of Radiation Oncology at [facility].     Treatment Summary:  Radiation Oncology - Course: 1 Protocol:   Treatment Site Current Dose Modality From To Elapsed Days Fx.  1 R breast  2,600 cGy 10x/18x  5/19/2022  2022   6  5                Dose per Fraction:     520 cGy   Total Dose:      2600 cGy        COMMENTS:   Ms. Lyon is a 67 year old woman with a right breast cancer. This was picked up on routine   screening mammogram on 3/17/22 which demonstrated an asymmetry at 10 o'clock, middle depth, 5 cm from   the nipple. Biopsy showed Leslie Grade 2 invasive ductal carcinoma, ER 98%+/NC 40%+/HER2-christina   negative by FISH.        Breast actionable panel was drawn on 2022 and was negative for pathogenic mutations.  She proceeded with   lumpectomy and SLN biopsy on 2022. Surgical pathology demonstrated a 9 mm Grade 2 invasive ductal   carcinoma. Negative for LVSI. Associated DCIS was nuclear grade 1, 2 mm in greatest dimension. All   invasive margins were clear, with the nearest being 7 mm from the posterior margin. All margins were clear   of DCIS, with the closest being 9 mm. Both sentinel nodes were uninvolved. pT1bN0 (sn).  Oncotype Dx came   back low risk at 18.      She then received a course of adjuvant radiation therapy using the FAST-FORWARD regimen. She tolerated   the treatment well without any noticeable side effects.          ED visits/hospitalizations: None     Missed treatments:  None     Acute Toxicity Profile by CTC v5.0:  Dermatitis: grade 0  Fatigue: grade 0     PAIN MANAGEMENT:          Not required                    FOLLOW UP PLAN:      Follow up in 1 month in our clinic  Follow up with Dr. Kaplan as scheduled.                         Staff Physician: Anny Sánchez M.D.  Physicist: Eitan Rodriges, PhD     CC: , MD Gardenia Kaplan MD                                        Radiation Oncology:  [facility], [address] [place] [id number]

## 2022-06-14 ENCOUNTER — ONCOLOGY VISIT (OUTPATIENT)
Dept: ONCOLOGY | Facility: CLINIC | Age: 67
End: 2022-06-14
Attending: PHYSICIAN ASSISTANT
Payer: MEDICARE

## 2022-06-14 VITALS
HEART RATE: 62 BPM | WEIGHT: 133 LBS | BODY MASS INDEX: 22.13 KG/M2 | DIASTOLIC BLOOD PRESSURE: 96 MMHG | RESPIRATION RATE: 16 BRPM | SYSTOLIC BLOOD PRESSURE: 157 MMHG | OXYGEN SATURATION: 100 % | TEMPERATURE: 97.8 F

## 2022-06-14 DIAGNOSIS — C50.411 MALIGNANT NEOPLASM OF UPPER-OUTER QUADRANT OF RIGHT BREAST IN FEMALE, ESTROGEN RECEPTOR POSITIVE (H): Primary | ICD-10-CM

## 2022-06-14 DIAGNOSIS — M85.89 OSTEOPENIA OF MULTIPLE SITES: ICD-10-CM

## 2022-06-14 DIAGNOSIS — Z17.0 MALIGNANT NEOPLASM OF UPPER-OUTER QUADRANT OF RIGHT BREAST IN FEMALE, ESTROGEN RECEPTOR POSITIVE (H): Primary | ICD-10-CM

## 2022-06-14 PROCEDURE — G0463 HOSPITAL OUTPT CLINIC VISIT: HCPCS

## 2022-06-14 PROCEDURE — 99214 OFFICE O/P EST MOD 30 MIN: CPT | Performed by: PHYSICIAN ASSISTANT

## 2022-06-14 RX ORDER — HEPARIN SODIUM,PORCINE 10 UNIT/ML
5 VIAL (ML) INTRAVENOUS
Status: CANCELLED | OUTPATIENT
Start: 2022-09-14

## 2022-06-14 RX ORDER — HEPARIN SODIUM (PORCINE) LOCK FLUSH IV SOLN 100 UNIT/ML 100 UNIT/ML
5 SOLUTION INTRAVENOUS
Status: CANCELLED | OUTPATIENT
Start: 2022-09-14

## 2022-06-14 RX ORDER — ANASTROZOLE 1 MG/1
1 TABLET ORAL DAILY
Qty: 90 TABLET | Refills: 3 | Status: SHIPPED | OUTPATIENT
Start: 2022-06-14 | End: 2023-06-12

## 2022-06-14 RX ORDER — ZOLEDRONIC ACID 0.04 MG/ML
4 INJECTION, SOLUTION INTRAVENOUS ONCE
Status: CANCELLED | OUTPATIENT
Start: 2022-09-14 | End: 2022-09-14

## 2022-06-14 ASSESSMENT — PAIN SCALES - GENERAL: PAINLEVEL: NO PAIN (0)

## 2022-06-14 NOTE — LETTER
6/14/2022         RE: Elisha Gao  [address] [facility] [address]      Oncology Visit:  Date on this visit: Jun 14, 2022    Diagnosis: right breast cancer.    Primary Physician: Debi Pizarro     History Of Present Illness:  Ms. Gao is a 67 year old female with right breast cancer.  Routine screening mammogram on 3/17/2022 showed an asymmetry in the upper outer right breast.  Diagnostic breast imaging confirmed a 4 mm spiculated mass at 11:00, 4 cm from the nipple of the right breast.  Biopsy was consistent with a grade 2 invasive ductal carcinoma, ER strong in 98%, AK moderate in 40%, and HER2 negative by FISH and atypical ductal hyperplasia.  Patient underwent right breast lumpectomy and right axillary sentinel lymph node biopsy under the care of Dr. Garcia on 4/11/2022.  Pathology showed a grade 2 invasive ductal carcinoma measuring 9 mm with associated low grade DCIS.  Surgical margins were negative.  There was no lymphovascular invasion and two sentinel lymph nodes were benign.     Oncotype DX was sent with score of 18 denoting a distant recurrence risk at 9 years of 5% assuming 5 years of endocrine therapy. Group absolute benefit to chemotherapy was <1%.     Hereditary Cancer Breast Actionable Panel was negative.     She completed adjuvant radiation to breast per FAST-FORWARD regimen from 5/19--5/25/22.     Interval History: Elisha is feeling well. She tolerated radiation treatment extremely well. She had some minor skin irritation upper pole of outer breast otherwise no fatigue or side effects. Skin has healed now. She has no concerns or questions today. Energy is intact. Appetite is WNL. No issues with bowels.     She is prepared to start endocrine therapy now. She had dental visit. She had root canal last week and this was the only planned work needed.     Past Medical/Surgical History:  Past Medical History:   Diagnosis Date     Breast cancer (H) 03/2022     Chronic low back pain      for a couple years, worse at night, not evaluated     Depression      Invasive ductal carcinoma of breast, female, right (H)      Osteopenia     FRAX  11/2%   2022   Colonoscopy in 2012 w/o polyps, next due in 2022.    Past Surgical History:   Procedure Laterality Date     HYSTERECTOMY  1987    endometriosis, ovarian cysts, hormones x 10  years     LASIK Bilateral      LUMPECTOMY, BREAST, LOCALIZED USING RADIOFREQUENCY IDENTIFICATION Right 4/11/2022    Procedure: LUMPECTOMY, BREAST, LOCALIZED USING RADIOFREQUENCY IDENTIFICATION, sentinel lymph node biopsy;  Surgeon: Santy Garcia MD;  Location: [facility] OR     Allergies:  Allergies as of 06/14/2022 - Reviewed 06/14/2022   Allergen Reaction Noted     Alendronic acid Nausea and Vomiting 05/26/2010     Raloxifene Nausea 05/26/2010     Risedronate Nausea and Vomiting 05/26/2010     Current Medications:  Current Outpatient Medications   Medication Sig Dispense Refill     anastrozole (ARIMIDEX) 1 MG tablet Take 1 tablet (1 mg) by mouth daily 90 tablet 3     buPROPion (WELLBUTRIN XL) 300 MG 24 hr tablet Take 1 tablet (300 mg) by mouth every morning For additional refills, please schedule annual appointment at [phone] 90 tablet 3     citalopram (CELEXA) 40 MG tablet Take 1 tablet (40 mg) by mouth daily 90 tablet 3     magnesium 250 MG tablet Take 1 tablet by mouth every morning        Vitamin D, Cholecalciferol, 25 MCG (1000 UT) TABS Take 3 tablets by mouth every morning             Physical Exam:  BP (!) 157/96   Pulse 62   Temp 97.8  F (36.6  C) (Oral)   Resp 16   Wt 60.3 kg (133 lb)   SpO2 100%   BMI 22.13 kg/m    General:  Well appearing adult female in NAD.  Alert and oriented x 3.  HEENT:  Normocephalic.  Sclera anicteric.    Breast:  Right axillary and right upper outer breast incisions are well healed. Some thickening and nodularity beneath breast incision consistent with scar tissue and small seroma. No axillary edema.   Musculo:  Full ROM of the R upper  extremity  Psych:  Mood and affect appear normal.    Laboratory/Imaging Studies  4/11/2022 right breast lumpectomy and right axillary sentinel lymph node biopsy:  - Grade 2 invasive ductal carcinoma measuring 9 mm.  - low grade DCIS  - No lymphovascular invasion  - surgical margins are negative for both invasive carcinoma and DCIS  - Two benign sentinel lymph nodes.    ASSESSMENT/PLAN:  66 yo female with stage Ia, [id number], grade 2, ER/VT positive, HER-2 negative invasive ductal carcinoma of the right breast.   She is s/p right breast lumpectomy and right axillary sentinel lymph node procedure and has now completed adjuvant breast radiation.     1.  Right breast cancer:  Since the last visit with Dr. Kaplan she has completed adjuvant breast radiation. She tolerated extremely well. Her Oncotype DX score returned at 18 which is low risk in someone over the age of 50. This estimates her risk of distant recurrence at 9 years to be approximately 5% assuming she completed 5 years of endocrine therapy.     She is feeling ready to start endocrine therapy. Dr. Kaplan has recommended treatment with anastrozole for 5 years. I reviewed side effects again including menopausal symptoms, joint aches/stiffness, and risk of loss of bone density over time.     2.  Osteopenia  DEXA bone density scan on 3/17/2022 with a lowest T-score of -2.0 and is consistent with osteopenia.  I recommend that she take vitamin D 1000 IUs daily and obtain 20-30 minutes of daily weight bearing activity. Reviewed plan to start Zometa 4 mg IV once every 6 months for 2-3 years to prevent bone loss on aromatase inhibitor therapy and also has been shown to reduce the risk of breast cancer bone metastasis in postmenopausal women with a h/o ER positive breast cancer.  We will plan to monitor a DEXA bone density scan once every 2 years while on anastrozole.  -Will consider starting Zometa after next visit once her tolerance to endocrine therapy is assessed.      3.  Cancer Risk Management:  Breast Actionable panel was drawn 4/4/2022 and was negative for pathogenic mutation.    RITIKA Alcaraz PA-C

## 2022-06-14 NOTE — PROGRESS NOTES
Oncology Visit:  Date on this visit: Jun 14, 2022    Diagnosis: right breast cancer.    Primary Physician: Debi Pizarro     History Of Present Illness:  Ms. Gao is a 67 year old female with right breast cancer.  Routine screening mammogram on 3/17/2022 showed an asymmetry in the upper outer right breast.  Diagnostic breast imaging confirmed a 4 mm spiculated mass at 11:00, 4 cm from the nipple of the right breast.  Biopsy was consistent with a grade 2 invasive ductal carcinoma, ER strong in 98%, MI moderate in 40%, and HER2 negative by FISH and atypical ductal hyperplasia.  Patient underwent right breast lumpectomy and right axillary sentinel lymph node biopsy under the care of Dr. Garcia on 4/11/2022.  Pathology showed a grade 2 invasive ductal carcinoma measuring 9 mm with associated low grade DCIS.  Surgical margins were negative.  There was no lymphovascular invasion and two sentinel lymph nodes were benign.     Oncotype DX was sent with score of 18 denoting a distant recurrence risk at 9 years of 5% assuming 5 years of endocrine therapy. Group absolute benefit to chemotherapy was <1%.     Hereditary Cancer Breast Actionable Panel was negative.     She completed adjuvant radiation to breast per FAST-FORWARD regimen from 5/19--5/25/22.     Interval History: Elisha is feeling well. She tolerated radiation treatment extremely well. She had some minor skin irritation upper pole of outer breast otherwise no fatigue or side effects. Skin has healed now. She has no concerns or questions today. Energy is intact. Appetite is WNL. No issues with bowels.     She is prepared to start endocrine therapy now. She had dental visit. She had root canal last week and this was the only planned work needed.     Past Medical/Surgical History:  Past Medical History:   Diagnosis Date     Breast cancer (H) 03/2022     Chronic low back pain     for a couple years, worse at night, not evaluated     Depression      Invasive ductal  carcinoma of breast, female, right (H)      Osteopenia     FRAX  11/2%   2022   Colonoscopy in 2012 w/o polyps, next due in 2022.    Past Surgical History:   Procedure Laterality Date     HYSTERECTOMY  1987    endometriosis, ovarian cysts, hormones x 10  years     LASIK Bilateral      LUMPECTOMY, BREAST, LOCALIZED USING RADIOFREQUENCY IDENTIFICATION Right 4/11/2022    Procedure: LUMPECTOMY, BREAST, LOCALIZED USING RADIOFREQUENCY IDENTIFICATION, sentinel lymph node biopsy;  Surgeon: Santy Garcia MD;  Location: [facility] OR     Allergies:  Allergies as of 06/14/2022 - Reviewed 06/14/2022   Allergen Reaction Noted     Alendronic acid Nausea and Vomiting 05/26/2010     Raloxifene Nausea 05/26/2010     Risedronate Nausea and Vomiting 05/26/2010     Current Medications:  Current Outpatient Medications   Medication Sig Dispense Refill     anastrozole (ARIMIDEX) 1 MG tablet Take 1 tablet (1 mg) by mouth daily 90 tablet 3     buPROPion (WELLBUTRIN XL) 300 MG 24 hr tablet Take 1 tablet (300 mg) by mouth every morning For additional refills, please schedule annual appointment at [phone] 90 tablet 3     citalopram (CELEXA) 40 MG tablet Take 1 tablet (40 mg) by mouth daily 90 tablet 3     magnesium 250 MG tablet Take 1 tablet by mouth every morning        Vitamin D, Cholecalciferol, 25 MCG (1000 UT) TABS Take 3 tablets by mouth every morning             Physical Exam:  BP (!) 157/96   Pulse 62   Temp 97.8  F (36.6  C) (Oral)   Resp 16   Wt 60.3 kg (133 lb)   SpO2 100%   BMI 22.13 kg/m    General:  Well appearing adult female in NAD.  Alert and oriented x 3.  HEENT:  Normocephalic.  Sclera anicteric.    Breast:  Right axillary and right upper outer breast incisions are well healed. Some thickening and nodularity beneath breast incision consistent with scar tissue and small seroma. No axillary edema.   Musculo:  Full ROM of the R upper extremity  Psych:  Mood and affect appear normal.    Laboratory/Imaging  Studies  4/11/2022 right breast lumpectomy and right axillary sentinel lymph node biopsy:  - Grade 2 invasive ductal carcinoma measuring 9 mm.  - low grade DCIS  - No lymphovascular invasion  - surgical margins are negative for both invasive carcinoma and DCIS  - Two benign sentinel lymph nodes.    ASSESSMENT/PLAN:  68 yo female with stage Ia, [id number], grade 2, ER/OK positive, HER-2 negative invasive ductal carcinoma of the right breast.   She is s/p right breast lumpectomy and right axillary sentinel lymph node procedure and has now completed adjuvant breast radiation.     1.  Right breast cancer:  Since the last visit with Dr. Kaplan she has completed adjuvant breast radiation. She tolerated extremely well. Her Oncotype DX score returned at 18 which is low risk in someone over the age of 50. This estimates her risk of distant recurrence at 9 years to be approximately 5% assuming she completed 5 years of endocrine therapy.     She is feeling ready to start endocrine therapy. Dr. Kaplan has recommended treatment with anastrozole for 5 years. I reviewed side effects again including menopausal symptoms, joint aches/stiffness, and risk of loss of bone density over time.     2.  Osteopenia  DEXA bone density scan on 3/17/2022 with a lowest T-score of -2.0 and is consistent with osteopenia.  I recommend that she take vitamin D 1000 IUs daily and obtain 20-30 minutes of daily weight bearing activity. Reviewed plan to start Zometa 4 mg IV once every 6 months for 2-3 years to prevent bone loss on aromatase inhibitor therapy and also has been shown to reduce the risk of breast cancer bone metastasis in postmenopausal women with a h/o ER positive breast cancer.  We will plan to monitor a DEXA bone density scan once every 2 years while on anastrozole.  -Will consider starting Zometa after next visit once her tolerance to endocrine therapy is assessed.     3.  Cancer Risk Management:  Breast Actionable panel was drawn  4/4/2022 and was negative for pathogenic mutation.    Caprice Hsu PA-C

## 2022-06-14 NOTE — NURSING NOTE
"Oncology Rooming Note    June 14, 2022 2:59 PM   Elisha Gao is a 67 year old female who presents for:    Chief Complaint   Patient presents with     Oncology Clinic Visit     Breast Ca     Initial Vitals: BP (!) 157/96   Pulse 62   Temp 97.8  F (36.6  C) (Oral)   Resp 16   Wt 60.3 kg (133 lb)   SpO2 100%   BMI 22.13 kg/m   Estimated body mass index is 22.13 kg/m  as calculated from the following:    Height as of 4/11/22: 1.651 m (5' 5\").    Weight as of this encounter: 60.3 kg (133 lb). Body surface area is 1.66 meters squared.  No Pain (0) Comment: Data Unavailable   No LMP recorded. Patient is postmenopausal.  Allergies reviewed: Yes  Medications reviewed: Yes    Medications: Medication refills not needed today.  Pharmacy name entered into [employer] [facility]:    [facility] [address] [place] [address] PHARMACY [place], [place] - [address] [facility] [phone]    Clinical concerns: Patient states there are no new concerns to discuss with provider.  Caprice was not notified.         Kellie Pedersen CMA              "

## 2022-06-17 DIAGNOSIS — F32.4 MAJOR DEPRESSIVE DISORDER IN PARTIAL REMISSION, UNSPECIFIED WHETHER RECURRENT (H): ICD-10-CM

## 2022-06-17 RX ORDER — BUPROPION HYDROCHLORIDE 300 MG/1
300 TABLET ORAL EVERY MORNING
Qty: 90 TABLET | Refills: 3 | Status: SHIPPED | OUTPATIENT
Start: 2022-06-17 | End: 2022-12-28

## 2022-06-21 NOTE — PROGRESS NOTES
Department of Therapeutic Radiology--Radiation Oncology                   [place] [address]  [address] [place]  [address]  Office:  [phone]  Fax:  [phone]   Radiation Oncology Clinic  [address] [place] [address]  Phone:  [phone]  Fax:  [phone]     RE: Elisha Gao : 1955   MRN: [id number] FRANCIA: 2022     OUTPATIENT VISIT NOTE       DIAGNOSIS: Invasive ductal carcinoma of the right breast, Grade 2, ER+/CT+/HER2-, pT1bN0(sn)    AREAS TREATED: right breast    DOSE:  2600 cGy in 5 fractions    TYPES OF RADIATION GIVEN: 3D conformal      INTERVAL SINCE COMPLETION OF RADIATION THERAPY: ~ 1 month (she completed the treatment on 2022)    SUBJECTIVE: Ms. Lyon is a 67 year old woman with a right breast cancer. This was picked up on routine screening mammogram on 3/17/22 which demonstrated an asymmetry at 10 o'clock, middle depth, 5 cm from the nipple. Biopsy showed [place] Grade 2 invasive ductal carcinoma, ER 98%+/CT 40%+/HER2-christina negative by FISH.        Breast actionable panel was drawn on 2022 and was negative for pathogenic mutations.  She proceeded with lumpectomy and SLN biopsy on 2022. Surgical pathology demonstrated a 9 mm Grade 2 invasive ductal carcinoma. Negative for LVSI. Associated DCIS was nuclear grade 1, 2 mm in greatest dimension. All invasive margins were clear, with the nearest being 7 mm from the posterior margin. All margins were clear of DCIS, with the closest being 9 mm. Both sentinel nodes were uninvolved. pT1bN0 (sn).  Oncotype Dx came back low risk at 18.      She then received a course of adjuvant radiation therapy using the FAST-FORWARD regimen. She tolerated the treatment well without any noticeable side effects.     After completing the treatment, Ms. Gao saw MsRadha Hsu and was prescribed Anastrozole. She was also recommended Vitamin D 1000 international unit(s) and consideration of Zometa for  osteopenia.     Ms. Gao is here today for a 1 month routine follow up visit. On interview today, Elisha states that she is doing well. She never had significant skin reaction with the treatment. She had perhaps very mild erythema on the right breast. She didn't experience any skin breakdown or fatigue. She has started Anastrozole and is tolerating it well thus far.     OBJECTIVE:  BP (!) 146/84   Pulse 69   Resp 16   Wt 60.3 kg (132 lb 14.4 oz)   SpO2 99%   BMI 22.12 kg/m     Gen:: appears well NAD  Skin: Very faint erythema of the breast breast. No edema. No desquamation.     ASSESSMENT AND PLAN: In summary, Ms. Gao is a 66 yo female with a pT1bN0(sn) grade 2 invasive ductal carcinoma of the right breast, s/p lumpectomy and SLN biopsy, 1 month s/p adjuvant radiation therapy. She tolerated the treatment exceedingly well and continues to feel great. She has no residual side effects from radiation therapy.     I will have my nurse Ms. Sylwia Hector give her a call in 6 months to touch base about her skin/breast just in case there are unexpected late side effects. Elisha otherwise can follow up with Dr. Kaplan and be seen on as needed basis. She is in agreement.          Anny Sánchez M.D./Ph.D.  Radiation Oncologist   Department of Therapeutic Radiology   [facility]  Phone: [phone]         30 minutes were spent on the date of the encounter doing chart review, history and exam, documentation and further activities as noted above.       Anny Sánchez MD

## 2022-06-24 ENCOUNTER — OFFICE VISIT (OUTPATIENT)
Dept: RADIATION ONCOLOGY | Facility: CLINIC | Age: 67
End: 2022-06-24
Attending: RADIOLOGY
Payer: MEDICARE

## 2022-06-24 VITALS
HEART RATE: 69 BPM | SYSTOLIC BLOOD PRESSURE: 146 MMHG | RESPIRATION RATE: 16 BRPM | WEIGHT: 132.9 LBS | BODY MASS INDEX: 22.12 KG/M2 | OXYGEN SATURATION: 99 % | DIASTOLIC BLOOD PRESSURE: 84 MMHG

## 2022-06-24 DIAGNOSIS — C50.411 MALIGNANT NEOPLASM OF UPPER-OUTER QUADRANT OF RIGHT BREAST IN FEMALE, ESTROGEN RECEPTOR POSITIVE (H): Primary | ICD-10-CM

## 2022-06-24 DIAGNOSIS — Z17.0 MALIGNANT NEOPLASM OF UPPER-OUTER QUADRANT OF RIGHT BREAST IN FEMALE, ESTROGEN RECEPTOR POSITIVE (H): Primary | ICD-10-CM

## 2022-06-24 PROCEDURE — G0463 HOSPITAL OUTPT CLINIC VISIT: HCPCS

## 2022-06-24 ASSESSMENT — PAIN SCALES - GENERAL: PAINLEVEL: NO PAIN (0)

## 2022-06-24 NOTE — LETTER
2022      RE: Elisha Gao  [address] [facility] [address]       Department of Therapeutic Radiology--Radiation Oncology                   [place] [address]  [address] [place]  [address]  Office:  [phone]  Fax:  [phone]   Radiation Oncology Clinic  [address] [place] [address]  Phone:  [phone]  Fax:  [phone]     RE: Elisha Gao : 1955   MRN: [id number] FRANCIA: 2022     OUTPATIENT VISIT NOTE       DIAGNOSIS: Invasive ductal carcinoma of the right breast, Grade 2, ER+/CT+/HER2-, pT1bN0(sn)    AREAS TREATED: right breast    DOSE:  2600 cGy in 5 fractions    TYPES OF RADIATION GIVEN: 3D conformal      INTERVAL SINCE COMPLETION OF RADIATION THERAPY: ~ 1 month (she completed the treatment on 2022)    SUBJECTIVE: Ms. Lyon is a 67 year old woman with a right breast cancer. This was picked up on routine screening mammogram on 3/17/22 which demonstrated an asymmetry at 10 o'clock, middle depth, 5 cm from the nipple. Biopsy showed [place] Grade 2 invasive ductal carcinoma, ER 98%+/CT 40%+/HER2-christina negative by FISH.        Breast actionable panel was drawn on 2022 and was negative for pathogenic mutations.  She proceeded with lumpectomy and SLN biopsy on 2022. Surgical pathology demonstrated a 9 mm Grade 2 invasive ductal carcinoma. Negative for LVSI. Associated DCIS was nuclear grade 1, 2 mm in greatest dimension. All invasive margins were clear, with the nearest being 7 mm from the posterior margin. All margins were clear of DCIS, with the closest being 9 mm. Both sentinel nodes were uninvolved. pT1bN0 (sn).  Oncotype Dx came back low risk at 18.      She then received a course of adjuvant radiation therapy using the FAST-FORWARD regimen. She tolerated the treatment well without any noticeable side effects.     After completing the treatment, Ms. Gao saw Ms. Caprice Hsu and was prescribed Anastrozole. She was also  recommended Vitamin D 1000 international unit(s) and consideration of Zometa for osteopenia.     Ms. Gao is here today for a 1 month routine follow up visit. On interview today, Elisha states that she is doing well. She never had significant skin reaction with the treatment. She had perhaps very mild erythema on the right breast. She didn't experience any skin breakdown or fatigue. She has started Anastrozole and is tolerating it well thus far.     OBJECTIVE:  BP (!) 146/84   Pulse 69   Resp 16   Wt 60.3 kg (132 lb 14.4 oz)   SpO2 99%   BMI 22.12 kg/m     Gen:: appears well NAD  Skin: Very faint erythema of the breast breast. No edema. No desquamation.     ASSESSMENT AND PLAN: In summary, Ms. Gao is a 66 yo female with a pT1bN0(sn) grade 2 invasive ductal carcinoma of the right breast, s/p lumpectomy and SLN biopsy, 1 month s/p adjuvant radiation therapy. She tolerated the treatment exceedingly well and continues to feel great. She has no residual side effects from radiation therapy.     I will have my nurse Ms. Sylwia Hector give her a call in 6 months to touch base about her skin/breast just in case there are unexpected late side effects. Elisha otherwise can follow up with Dr. Kaplan and be seen on as needed basis. She is in agreement.          Anny Sánchez M.D./Ph.D.  Radiation Oncologist   Department of Therapeutic Radiology   [facility]  Phone: [phone]         30 minutes were spent on the date of the encounter doing chart review, history and exam, documentation and further activities as noted above.       Anny Sánchez MD    FOLLOW-UP VISIT    Patient Name: Elisha Gao      : 1955     Age: 67 year old        ______________________________________________________________________________     Chief Complaint   Patient presents with     Breast Cancer     Follow up for radiation therapy     BP (!) 146/84   Pulse 69   Resp 16   Wt 60.3 kg (132 lb 14.4 oz)   SpO2 99%    BMI 22.12 kg/m       Date Radiation Completed: Invasive ductal carcinoma of the right breast, Grade 2, ER+/TN+/HER2-, pT1bN0(sn) 2600 cGy in 5 fx completed 5/25/22    Pain  Denies    Labs  Other Labs: No    Imaging  None        Residual Radiation side effect: denies     Additional Instructions: Monitor BP at home for 2 weeks in AM, report results to PCP.    Nurse face-to-face time: Level 2:  5 min face to face time        Anny Sánchez MD

## 2022-06-24 NOTE — LETTER
2022         RE: Elisha Gao  [address] [facility] [address]        Dear Colleague,    Thank you for referring your patient, Elisha Gao, to the [facility] RADIATION ONCOLOGY. Please see a copy of my visit note below.    Department of Therapeutic Radiology--Radiation Oncology                   [place] [address]  [address] [place]  [address]  Office:  [phone]  Fax:  [phone]   Radiation Oncology Clinic  [address] [place] [address]  Phone:  [phone]  Fax:  [phone]     RE: Elisha Gao : 1955   MRN: [id number] FRANCIA: 2022     OUTPATIENT VISIT NOTE       DIAGNOSIS: Invasive ductal carcinoma of the right breast, Grade 2, ER+/NC+/HER2-, pT1bN0(sn)    AREAS TREATED: right breast    DOSE:  2600 cGy in 5 fractions    TYPES OF RADIATION GIVEN: 3D conformal      INTERVAL SINCE COMPLETION OF RADIATION THERAPY: ~ 1 month (she completed the treatment on 2022)    SUBJECTIVE: Ms. Lyon is a 67 year old woman with a right breast cancer. This was picked up on routine screening mammogram on 3/17/22 which demonstrated an asymmetry at 10 o'clock, middle depth, 5 cm from the nipple. Biopsy showed [place] Grade 2 invasive ductal carcinoma, ER 98%+/NC 40%+/HER2-christina negative by FISH.        Breast actionable panel was drawn on 2022 and was negative for pathogenic mutations.  She proceeded with lumpectomy and SLN biopsy on 2022. Surgical pathology demonstrated a 9 mm Grade 2 invasive ductal carcinoma. Negative for LVSI. Associated DCIS was nuclear grade 1, 2 mm in greatest dimension. All invasive margins were clear, with the nearest being 7 mm from the posterior margin. All margins were clear of DCIS, with the closest being 9 mm. Both sentinel nodes were uninvolved. pT1bN0 (sn).  Oncotype Dx came back low risk at 18.      She then received a course of adjuvant radiation therapy using the FAST-FORWARD regimen. She  tolerated the treatment well without any noticeable side effects.     After completing the treatment, Ms. Gao saw Ms. Caprice Hsu and was prescribed Anastrozole. She was also recommended Vitamin D 1000 international unit(s) and consideration of Zometa for osteopenia.     Ms. Gao is here today for a 1 month routine follow up visit. On interview today, Elisha states that she is doing well. She never had significant skin reaction with the treatment. She had perhaps very mild erythema on the right breast. She didn't experience any skin breakdown or fatigue. She has started Anastrozole and is tolerating it well thus far.     OBJECTIVE:  BP (!) 146/84   Pulse 69   Resp 16   Wt 60.3 kg (132 lb 14.4 oz)   SpO2 99%   BMI 22.12 kg/m     Gen:: appears well NAD  Skin: Very faint erythema of the breast breast. No edema. No desquamation.     ASSESSMENT AND PLAN: In summary, Ms. Gao is a 66 yo female with a pT1bN0(sn) grade 2 invasive ductal carcinoma of the right breast, s/p lumpectomy and SLN biopsy, 1 month s/p adjuvant radiation therapy. She tolerated the treatment exceedingly well and continues to feel great. She has no residual side effects from radiation therapy.     I will have my nurse MsRadha Sylwia Hector give her a call in 6 months to touch base about her skin/breast just in case there are unexpected late side effects. Elisha otherwise can follow up with Dr. Kaplan and be seen on as needed basis. She is in agreement.          Anny Sánchez M.D./Ph.D.  Radiation Oncologist   Department of Therapeutic Radiology   [facility]  Phone: [phone]         30 minutes were spent on the date of the encounter doing chart review, history and exam, documentation and further activities as noted above.       Anny Sánchez MD    FOLLOW-UP VISIT    Patient Name: Elisha Gao      : 1955     Age: 67 year old        ______________________________________________________________________________      Chief Complaint   Patient presents with     Breast Cancer     Follow up for radiation therapy     BP (!) 146/84   Pulse 69   Resp 16   Wt 60.3 kg (132 lb 14.4 oz)   SpO2 99%   BMI 22.12 kg/m       Date Radiation Completed: Invasive ductal carcinoma of the right breast, Grade 2, ER+/TX+/HER2-, pT1bN0(sn) 2600 cGy in 5 fx completed 5/25/22    Pain  Denies    Labs  Other Labs: No    Imaging  None        Residual Radiation side effect: denies     Additional Instructions: Monitor BP at home for 2 weeks in AM, report results to PCP.    Nurse face-to-face time: Level 2:  5 min face to face time              Again, thank you for allowing me to participate in the care of your patient.        Sincerely,        Anny Sánchez MD

## 2022-06-24 NOTE — PROGRESS NOTES
FOLLOW-UP VISIT    Patient Name: Elisha Gao      : 1955     Age: 67 year old        ______________________________________________________________________________     Chief Complaint   Patient presents with     Breast Cancer     Follow up for radiation therapy     BP (!) 146/84   Pulse 69   Resp 16   Wt 60.3 kg (132 lb 14.4 oz)   SpO2 99%   BMI 22.12 kg/m       Date Radiation Completed: Invasive ductal carcinoma of the right breast, Grade 2, ER+/TX+/HER2-, pT1bN0(sn) 2600 cGy in 5 fx completed 22    Pain  Denies    Labs  Other Labs: No    Imaging  None        Residual Radiation side effect: denies     Additional Instructions: Monitor BP at home for 2 weeks in AM, report results to PCP.    Nurse face-to-face time: Level 2:  5 min face to face time

## 2022-09-18 NOTE — PROGRESS NOTES
Oncology Visit:  Date on this visit: 9/19/2022    Diagnosis: right breast cancer.    Primary Physician: Debi Pizarro     History Of Present Illness:  Ms. Gao is a 67 year old female with right breast cancer.  Routine screening mammogram on 3/17/2022 showed an asymmetry in the upper outer right breast.  Diagnostic breast imaging confirmed a 4 mm spiculated mass at 11:00, 4 cm from the nipple of the right breast.  Biopsy was consistent with a grade 2 invasive ductal carcinoma, ER strong in 98%, [place] moderate in 40%, and HER2 negative by FISH and atypical ductal hyperplasia.  Patient underwent right breast lumpectomy and right axillary sentinel lymph node biopsy under the care of Dr. Garcia on 4/11/2022.  Pathology showed a grade 2 invasive ductal carcinoma measuring 9 mm with associated low grade DCIS.  Surgical margins were negative.  There was no lymphovascular invasion and two sentinel lymph nodes were benign.  She completed radiation, 2600 cGy in 5 fractions, to the right breast on 5/25/2022.  She has been on anastrozole since 6/15/2022.    Interval History:  Ms. Gao comes into clinic today for routine breast cancer follow-up.  She is on treatment with anastrozole.  She is tolerating the medication well.  She denies significant joint pain or stiffness.  Since her last visit, she partially tore her right Achilles tendon.  Prior to this she was routinely walking [place], now she is using a cart.  She has not yet seen an orthopedic surgeon regarding the tendon tear.  She denies other bone or joint aches or pains.  She has no significant hot flashes.  She denies vaginal dryness or pain.  She has had no worsening of mood disorder.  She continues to have tenderness and achiness in her upper outer right breast.  She reports full range of motion of her right upper extremity.  She denies any breast lumps or lesions.  She has had no fevers, chills, or infection complaints.  She denies cough, shortness of  breath, or chest pain.  She has no abdominal complaints.  The remainder of a complete 12 point review of systems was reviewed with the patient was negative with exception that mentioned above.    Past Medical/Surgical History:  Past Medical History:   Diagnosis Date     Breast cancer (H) 03/2022     Chronic low back pain     for a couple years, worse at night, not evaluated     Depression      Invasive ductal carcinoma of breast, female, right (H)      Osteopenia     FRAX  11/2%   2022   Colonoscopy in 2012 w/o polyps, next due in 2022.    Past Surgical History:   Procedure Laterality Date     HYSTERECTOMY  1987    endometriosis, ovarian cysts, hormones x 10  years     LASIK Bilateral      LUMPECTOMY, BREAST, LOCALIZED USING RADIOFREQUENCY IDENTIFICATION Right 4/11/2022    Procedure: LUMPECTOMY, BREAST, LOCALIZED USING RADIOFREQUENCY IDENTIFICATION, sentinel lymph node biopsy;  Surgeon: Santy Garcia MD;  Location: [facility] OR     Allergies:  Allergies as of 09/19/2022 - Reviewed 06/24/2022   Allergen Reaction Noted     Alendronic acid Nausea and Vomiting 05/26/2010     Raloxifene Nausea 05/26/2010     Risedronate Nausea and Vomiting 05/26/2010     Current Medications:  Current Outpatient Medications   Medication Sig Dispense Refill     anastrozole (ARIMIDEX) 1 MG tablet Take 1 tablet (1 mg) by mouth daily 90 tablet 3     buPROPion (WELLBUTRIN XL) 300 MG 24 hr tablet Take 1 tablet (300 mg) by mouth every morning 90 tablet 3     citalopram (CELEXA) 40 MG tablet Take 1 tablet (40 mg) by mouth daily 90 tablet 3     magnesium 250 MG tablet Take 1 tablet by mouth every morning        Vitamin D, Cholecalciferol, 25 MCG (1000 UT) TABS Take 3 tablets by mouth every morning         Family and Social History:  See initial consultation dated 4/4/2022 for further details.  Breast Actionable panel on 4/4/2022 was negative for germline genetic mutation.    Physical Exam:  /81 (BP Location: Left arm, Patient Position: Sitting,  Cuff Size: Adult Regular)   Pulse 66   Temp 98.5  F (36.9  C) (Oral)   Resp 16   Wt 60.4 kg (133 lb 3.2 oz)   SpO2 97%   BMI 22.17 kg/m    General:  Well appearing, well-nourished adult female in NAD.  Alert and oriented x 3.  HEENT:  Normocephalic.  Sclera anicteric.  MMM.    Lymph:  No palpable cervical, supraclavicular, or axillary LAD.  Chest:  CTA bilaterally.  No wheezes or crackles.  CV:  RRR.  Nl S1 and S2.  No m/r/g.  Breast:  Right axillary and right upper outer breast incision are well healed.  Right breast is overall tender to palpation, worse in area of lumpectomy incision.  There are no discretely palpable masses in either breast.  Bilateral nipples are flat and without discharge.  Abd:  Soft/ND.    Ext:  No edema of the bilateral lower extremities.    Musculo:  Full ROM of the bilateral upper extremities.  Neuro:  Cranial nerves grossly intact.  Gait stable.  Psych:  Mood and affect appear normal.  Skin:  No visible concerning skin rashes or lesions.    Laboratory/Imaging Studies  9/19/2022 Labs:  Calcium and albumin are wnl.  Creatinine is slightly elevated at 0.97 mg/dL, GFR is estimated at 63 mL/min    ASSESSMENT/PLAN:  66 yo female with stage Ia, [id number], grade 2, ER/DE positive, HER-2 negative invasive ductal carcinoma of the right breast.   She is s/p right breast lumpectomy and right axillary sentinel lymph node procedure.    1.  Right breast cancer:  Ms. Gao is 5 months out from excision of a right breast cancer.  She is on treatment with anastrozole.  Plan is to treat with a total 5 years of endocrine therapy.    I reassured her today that right breast pain is likely secondary to surgical and radiation induced scar tissue/fibrosis rather than cancer.  Okay to ice and take NSAIDs prn.  If experiences any swelling, would recommend wearing a compressive bra such as a sports bra.    Will obtain bilateral diagnostic mammograms at the time of her return visit in 3 months.  We will schedule  this visit in Survivor clinic so that she may obtain a treatment summary.    2.  Osteopenia:  DEXA bone density scan on 3/17/2022 with a lowest T-score of -2.0 and is consistent with osteopenia.  Plan to start Zometa 4 mg IV once every 6 months for 3 years, to prevent bone loss on aromatase inhibitor therapy and also reduce the risk of breast cancer bone metastasis, today.  We again reviewed the potential side effects of Zometa.  We specifically reviewed that approximately 40% of patients have fever, chills, and body aches in the 2-3 days following the first dose.  This is less common with subsequent doses.  It is okay for her to take tylenol up to 650 mg every 6 hours prn fever or body aches.    3.  Follow Up:    - bilateral diagnostic mammograms and survivor visit with Sarah Rosenberg in 3 months.  - labs, visit with me, and Zometa infusion in 6 months.    30 minutes spent on the date of the encounter doing chart review, review of test results, interpretation of tests, patient visit and documentation.

## 2022-09-19 ENCOUNTER — INFUSION THERAPY VISIT (OUTPATIENT)
Dept: ONCOLOGY | Facility: CLINIC | Age: 67
End: 2022-09-19
Attending: INTERNAL MEDICINE
Payer: MEDICARE

## 2022-09-19 ENCOUNTER — APPOINTMENT (OUTPATIENT)
Dept: LAB | Facility: CLINIC | Age: 67
End: 2022-09-19
Attending: INTERNAL MEDICINE
Payer: MEDICARE

## 2022-09-19 VITALS
DIASTOLIC BLOOD PRESSURE: 81 MMHG | OXYGEN SATURATION: 97 % | TEMPERATURE: 98.5 F | HEART RATE: 66 BPM | BODY MASS INDEX: 22.17 KG/M2 | SYSTOLIC BLOOD PRESSURE: 129 MMHG | RESPIRATION RATE: 16 BRPM | WEIGHT: 133.2 LBS

## 2022-09-19 DIAGNOSIS — C50.411 MALIGNANT NEOPLASM OF UPPER-OUTER QUADRANT OF RIGHT BREAST IN FEMALE, ESTROGEN RECEPTOR POSITIVE (H): ICD-10-CM

## 2022-09-19 DIAGNOSIS — C50.411 MALIGNANT NEOPLASM OF UPPER-OUTER QUADRANT OF RIGHT BREAST IN FEMALE, ESTROGEN RECEPTOR POSITIVE (H): Primary | ICD-10-CM

## 2022-09-19 DIAGNOSIS — Z17.0 MALIGNANT NEOPLASM OF UPPER-OUTER QUADRANT OF RIGHT BREAST IN FEMALE, ESTROGEN RECEPTOR POSITIVE (H): Primary | ICD-10-CM

## 2022-09-19 DIAGNOSIS — M85.89 OSTEOPENIA OF MULTIPLE SITES: ICD-10-CM

## 2022-09-19 DIAGNOSIS — M85.89 OSTEOPENIA OF MULTIPLE SITES: Primary | ICD-10-CM

## 2022-09-19 DIAGNOSIS — Z17.0 MALIGNANT NEOPLASM OF UPPER-OUTER QUADRANT OF RIGHT BREAST IN FEMALE, ESTROGEN RECEPTOR POSITIVE (H): ICD-10-CM

## 2022-09-19 DIAGNOSIS — N64.4 BREAST PAIN, RIGHT: ICD-10-CM

## 2022-09-19 LAB
ALBUMIN SERPL BCG-MCNC: 4 G/DL (ref 3.5–5.2)
CALCIUM SERPL-MCNC: 9 MG/DL (ref 8.8–10.2)
CREAT SERPL-MCNC: 0.97 MG/DL (ref 0.51–0.95)
GFR SERPL CREATININE-BSD FRML MDRD: 63 ML/MIN/1.73M2

## 2022-09-19 PROCEDURE — 36415 COLL VENOUS BLD VENIPUNCTURE: CPT | Performed by: PHYSICIAN ASSISTANT

## 2022-09-19 PROCEDURE — 82565 ASSAY OF CREATININE: CPT | Performed by: PHYSICIAN ASSISTANT

## 2022-09-19 PROCEDURE — 99214 OFFICE O/P EST MOD 30 MIN: CPT | Performed by: INTERNAL MEDICINE

## 2022-09-19 PROCEDURE — 82040 ASSAY OF SERUM ALBUMIN: CPT | Performed by: PHYSICIAN ASSISTANT

## 2022-09-19 PROCEDURE — 82310 ASSAY OF CALCIUM: CPT | Performed by: PHYSICIAN ASSISTANT

## 2022-09-19 PROCEDURE — 258N000003 HC RX IP 258 OP 636: Performed by: PHYSICIAN ASSISTANT

## 2022-09-19 PROCEDURE — G0463 HOSPITAL OUTPT CLINIC VISIT: HCPCS

## 2022-09-19 PROCEDURE — 250N000011 HC RX IP 250 OP 636: Performed by: PHYSICIAN ASSISTANT

## 2022-09-19 RX ORDER — HEPARIN SODIUM (PORCINE) LOCK FLUSH IV SOLN 100 UNIT/ML 100 UNIT/ML
5 SOLUTION INTRAVENOUS
Status: DISCONTINUED | OUTPATIENT
Start: 2022-09-19 | End: 2022-09-19 | Stop reason: HOSPADM

## 2022-09-19 RX ORDER — HEPARIN SODIUM,PORCINE 10 UNIT/ML
5 VIAL (ML) INTRAVENOUS
Status: DISCONTINUED | OUTPATIENT
Start: 2022-09-19 | End: 2022-09-19 | Stop reason: HOSPADM

## 2022-09-19 RX ORDER — ZOLEDRONIC ACID 0.04 MG/ML
4 INJECTION, SOLUTION INTRAVENOUS ONCE
Status: COMPLETED | OUTPATIENT
Start: 2022-09-19 | End: 2022-09-19

## 2022-09-19 RX ADMIN — ZOLEDRONIC ACID 4 MG: 0.04 INJECTION, SOLUTION INTRAVENOUS at 09:36

## 2022-09-19 RX ADMIN — SODIUM CHLORIDE 250 ML: 9 INJECTION, SOLUTION INTRAVENOUS at 09:36

## 2022-09-19 ASSESSMENT — PAIN SCALES - GENERAL: PAINLEVEL: NO PAIN (0)

## 2022-09-19 NOTE — PROGRESS NOTES
Infusion Nursing Note:  Elisha Gao presents today for zometa.    Patient seen by provider today: Yes: Dr. Jensen   present during visit today: Not Applicable.    Note: Pt is here today for her first zometa infusion. Patient states that Dr. Kaplan reviewed the medication, schedule, and potential side effects during th office visit today. This writer reiterated those things as well and patient given printed handout on zometa. Pt denies any dental concerns and is spoke to Dr. Kaplan about this.  Pt stated all questions were answered and stated understanding.    Patient is currently taking Vitamin D but not any calcium supplements. Patient educated on taking calcium and vitamin D and given written instructions on dosages.     Intravenous Access:  Peripheral IV placed.    Treatment Conditions:  Lab Results   Component Value Date    HGB 14.6 04/11/2022    WBC 5.0 04/11/2022     04/11/2022      Lab Results   Component Value Date     03/02/2022    POTASSIUM 4.3 03/02/2022    CR 0.97 (H) 09/19/2022    JOSE 9.0 09/19/2022    BILITOTAL 0.6 03/02/2022    ALBUMIN 4.0 09/19/2022    ALT 18 03/02/2022    AST 14 03/02/2022     Results reviewed, labs MET treatment parameters, ok to proceed with treatment.    Post Infusion Assessment:  Patient tolerated infusion without incident.  Blood return noted pre and post infusion.  Site patent and intact, free from redness, edema or discomfort.  No evidence of extravasations.  Access discontinued per protocol.     Discharge Plan:   Patient declined prescription refills.  AVS to patient via POPAPPT.  Patient will return in 6 months for next zometa appointment.   Patient discharged in stable condition accompanied by: self.  Departure Mode: Ambulatory.      Velma Kaba RN

## 2022-09-19 NOTE — NURSING NOTE
"Oncology Rooming Note    September 19, 2022 8:30 AM   Elisha Gao is a 67 year old female who presents for:    Chief Complaint   Patient presents with     Blood Draw     Vitals taken, PIV started, labs drawn, saline locked, checked into next appt     Oncology Clinic Visit     Rtn for breast cancer     Initial Vitals: /81 (BP Location: Left arm, Patient Position: Sitting, Cuff Size: Adult Regular)   Pulse 66   Temp 98.5  F (36.9  C) (Oral)   Resp 16   Wt 60.4 kg (133 lb 3.2 oz)   SpO2 97%   BMI 22.17 kg/m   Estimated body mass index is 22.17 kg/m  as calculated from the following:    Height as of 4/11/22: 1.651 m (5' 5\").    Weight as of this encounter: 60.4 kg (133 lb 3.2 oz). Body surface area is 1.66 meters squared.  No Pain (0) Comment: Data Unavailable   No LMP recorded. Patient is postmenopausal.  Allergies reviewed: Yes  Medications reviewed: Yes    Medications: Medication refills not needed today.  Pharmacy name entered into [facility]:    [facility] On license of [facility] - [place], [place] - [address] [place] [address] PHARMACY [place], [place] - [address] [place] [address] [phone]  [place], [place] - [address] [place] [address]    Clinical concerns: Patient has no specific questions or clinical concerns outside of the reason for the visit.       Jane Montiel, EMT            "

## 2022-09-19 NOTE — LETTER
9/19/2022         RE: Elisha Gao  [address] [facility] [place] [address]        Dear Colleague,    Thank you for referring your patient, Elisha Gao, to the [facility] CANCER CLINIC. Please see a copy of my visit note below.    Oncology Visit:  Date on this visit: 9/19/2022    Diagnosis: right breast cancer.    Primary Physician: Debi Pizarro     History Of Present Illness:  Ms. Gao is a 67 year old female with right breast cancer.  Routine screening mammogram on 3/17/2022 showed an asymmetry in the upper outer right breast.  Diagnostic breast imaging confirmed a 4 mm spiculated mass at 11:00, 4 cm from the nipple of the right breast.  Biopsy was consistent with a grade 2 invasive ductal carcinoma, ER strong in 98%, [place] moderate in 40%, and HER2 negative by FISH and atypical ductal hyperplasia.  Patient underwent right breast lumpectomy and right axillary sentinel lymph node biopsy under the care of Dr. Garcia on 4/11/2022.  Pathology showed a grade 2 invasive ductal carcinoma measuring 9 mm with associated low grade DCIS.  Surgical margins were negative.  There was no lymphovascular invasion and two sentinel lymph nodes were benign.  She completed radiation, 2600 cGy in 5 fractions, to the right breast on 5/25/2022.  She has been on anastrozole since 6/15/2022.    Interval History:  Ms. Gao comes into clinic today for routine breast cancer follow-up.  She is on treatment with anastrozole.  She is tolerating the medication well.  She denies significant joint pain or stiffness.  Since her last visit, she partially tore her right Achilles tendon.  Prior to this she was [place] walking [place], now she is using a cart.  She has not yet seen an orthopedic surgeon regarding the tendon tear.  She denies other bone or joint aches or pains.  She has no significant hot flashes.  She denies vaginal dryness or pain.  She has had no worsening of mood disorder.  She continues to  have tenderness and achiness in her upper outer right breast.  She reports full range of motion of her right upper extremity.  She denies any breast lumps or lesions.  She has had no fevers, chills, or infection complaints.  She denies cough, shortness of breath, or chest pain.  She has no abdominal complaints.  The remainder of a complete 12 point review of systems was reviewed with the patient was negative with exception that mentioned above.    Past Medical/Surgical History:  Past Medical History:   Diagnosis Date     Breast cancer (H) 03/2022     Chronic low back pain     for a couple years, worse at night, not evaluated     Depression      Invasive ductal carcinoma of breast, female, right (H)      Osteopenia     FRAX  11/2%   2022   Colonoscopy in 2012 w/o polyps, next due in 2022.    Past Surgical History:   Procedure Laterality Date     HYSTERECTOMY  1987    endometriosis, ovarian cysts, hormones x 10  years     LASIK Bilateral      LUMPECTOMY, BREAST, LOCALIZED USING RADIOFREQUENCY IDENTIFICATION Right 4/11/2022    Procedure: LUMPECTOMY, BREAST, LOCALIZED USING RADIOFREQUENCY IDENTIFICATION, sentinel lymph node biopsy;  Surgeon: Santy Garcia MD;  Location: [facility] OR     Allergies:  Allergies as of 09/19/2022 - Reviewed 06/24/2022   Allergen Reaction Noted     Alendronic acid Nausea and Vomiting 05/26/2010     Raloxifene Nausea 05/26/2010     Risedronate Nausea and Vomiting 05/26/2010     Current Medications:  Current Outpatient Medications   Medication Sig Dispense Refill     anastrozole (ARIMIDEX) 1 MG tablet Take 1 tablet (1 mg) by mouth daily 90 tablet 3     buPROPion (WELLBUTRIN XL) 300 MG 24 hr tablet Take 1 tablet (300 mg) by mouth every morning 90 tablet 3     citalopram (CELEXA) 40 MG tablet Take 1 tablet (40 mg) by mouth daily 90 tablet 3     magnesium 250 MG tablet Take 1 tablet by mouth every morning        Vitamin D, Cholecalciferol, 25 MCG (1000 UT) TABS Take 3 tablets by mouth every morning          Family and Social History:  See initial consultation dated 4/4/2022 for further details.  Breast Actionable panel on 4/4/2022 was negative for germline genetic mutation.    Physical Exam:  /81 (BP Location: Left arm, Patient Position: Sitting, Cuff Size: Adult Regular)   Pulse 66   Temp 98.5  F (36.9  C) (Oral)   Resp 16   Wt 60.4 kg (133 lb 3.2 oz)   SpO2 97%   BMI 22.17 kg/m    General:  Well appearing, well-nourished adult female in NAD.  Alert and oriented x 3.  HEENT:  Normocephalic.  Sclera anicteric.  MMM.    Lymph:  No palpable cervical, supraclavicular, or axillary LAD.  Chest:  CTA bilaterally.  No wheezes or crackles.  CV:  RRR.  Nl S1 and S2.  No m/r/g.  Breast:  Right axillary and right upper outer breast incision are well healed.  Right breast is overall tender to palpation, worse in area of lumpectomy incision.  There are no discretely palpable masses in either breast.  Bilateral nipples are flat and without discharge.  Abd:  Soft/ND.    Ext:  No edema of the bilateral lower extremities.    Musculo:  Full ROM of the bilateral upper extremities.  Neuro:  Cranial nerves grossly intact.  Gait stable.  Psych:  Mood and affect appear normal.  Skin:  No visible concerning skin rashes or lesions.    Laboratory/Imaging Studies  9/19/2022 Labs:  Calcium and albumin are wnl.  Creatinine is slightly elevated at 0.97 mg/dL, GFR is estimated at 63 mL/min    ASSESSMENT/PLAN:  66 yo female with stage Ia, [id number], grade 2, ER/OK positive, HER-2 negative invasive ductal carcinoma of the right breast.   She is s/p right breast lumpectomy and right axillary sentinel lymph node procedure.    1.  Right breast cancer:  Ms. Gao is 5 months out from excision of a right breast cancer.  She is on treatment with anastrozole.  Plan is to treat with a total 5 years of endocrine therapy.    I reassured her today that right breast pain is likely secondary to surgical and radiation induced scar tissue/fibrosis  rather than cancer.  Okay to ice and take NSAIDs prn.  If experiences any swelling, would recommend wearing a compressive bra such as a sports bra.    Will obtain bilateral diagnostic mammograms at the time of her return visit in 3 months.  We will schedule this visit in Survivor clinic so that she may obtain a treatment summary.    2.  Osteopenia:  DEXA bone density scan on 3/17/2022 with a lowest T-score of -2.0 and is consistent with osteopenia.  Plan to start Zometa 4 mg IV once every 6 months for 3 years, to prevent bone loss on aromatase inhibitor therapy and also reduce the risk of breast cancer bone metastasis, today.  We again reviewed the potential side effects of Zometa.  We specifically reviewed that approximately 40% of patients have fever, chills, and body aches in the 2-3 days following the first dose.  This is less common with subsequent doses.  It is okay for her to take tylenol up to 650 mg every 6 hours prn fever or body aches.    3.  Follow Up:    - bilateral diagnostic mammograms and survivor visit with Sarah Rosenberg in 3 months.  - labs, visit with me, and Zometa infusion in 6 months.    30 minutes spent on the date of the encounter doing chart review, review of test results, interpretation of tests, patient visit and documentation.         Again, thank you for allowing me to participate in the care of your patient.      Sincerely,    Gardenia Kaplan MD

## 2022-09-19 NOTE — NURSING NOTE
Chief Complaint   Patient presents with     Blood Draw     Vitals taken, PIV started, labs drawn, saline locked, checked into next appt     /81 (BP Location: Left arm, Patient Position: Sitting, Cuff Size: Adult Regular)   Pulse 66   Temp 98.5  F (36.9  C) (Oral)   Resp 16   Wt 60.4 kg (133 lb 3.2 oz)   SpO2 97%   BMI 22.17 kg/m       Jake Espinoza RN on 9/19/2022 at 8:16 AM

## 2022-10-29 ENCOUNTER — HEALTH MAINTENANCE LETTER (OUTPATIENT)
Age: 67
End: 2022-10-29

## 2022-12-08 NOTE — PROGRESS NOTES
CANCER SURVIVORSHIP VISIT-End of treatment visit  Dec 15, 2022    Care Team   Primary Care Provider Debi Pizarro   Surgeon  Radha, Sanyt JEREZ MD   Radiation Oncologist Anny Sánchez MD   Medical Oncologist  Rosaura, Gardenia Messer MD       REASON FOR VISIT: survivorship visit for history of breast cancer    CANCER HISTORY AND TREATMENT:    History of right-sided stage IA breast cancer, ER/UT positive, HER2 negative, diagnosed in   *Diagnosis: 3/28/2022. Abnormal screening mammogram.   *Surgery: Right breast lumpectomy and right axillary sentinel lymph node biopsy 2022.  Invasive ductal carcinoma; Stage IA (cT1b, cN0, cM0, G2, ER+, UT+, HER2-) Oncotype DX score 18  *Radiation: She completed radiation, 2600 cGy in 5 fractions, to the right breast on 2022.   *Endocrine therapy: Anastrozole 6/15/2022-current  *Genetic testin2022 Negative BRCA1/BRCA2.    INTERVAL HISTORY:     Elisha is here for an end of treatment visit referred by Dr. Kaplan.     Anastrazole- no issues.     Zometa infusion went well, mild fatigue    No new lumps/bumps or new pain.     Late effects: no lymphedema or ROM difficulty    Mood: no concerns but did recently lose her mother and her horse. Coping ok.     Sexual health: no concerns. Hysterectomy/oophorectomy in 30's. Does have some vaginal dryness.         Past Medical History:   Diagnosis Date     Breast cancer (H) 2022     Chronic low back pain     for a couple years, worse at night, not evaluated     Depression      Invasive ductal carcinoma of breast, female, right (H)      Osteopenia     FRAX  11/2%          Current Outpatient Medications   Medication Sig Dispense Refill     anastrozole (ARIMIDEX) 1 MG tablet Take 1 tablet (1 mg) by mouth daily 90 tablet 3     buPROPion (WELLBUTRIN XL) 300 MG 24 hr tablet Take 1 tablet (300 mg) by mouth every morning 90 tablet 3     citalopram (CELEXA) 40 MG tablet Take 1 tablet (40 mg) by mouth daily 90 tablet 3      magnesium 250 MG tablet Take 1 tablet by mouth every morning        Vitamin D, Cholecalciferol, 25 MCG (1000 UT) TABS Take 3 tablets by mouth every morning             Allergies   Allergen Reactions     Alendronic Acid Nausea and Vomiting     Raloxifene Nausea     Risedronate Nausea and Vomiting       Family History   Problem Relation Age of Onset     Colon Cancer Mother         75     Lung Cancer Mother         90     Macular Degeneration Mother      Dementia Mother      Heart Disease Father         62     Bipolar Disorder Brother      Glaucoma No family hx of      Anesthesia Reaction No family hx of      Thrombosis No family hx of         Social history:  Living situation:  (wife), lives in [facility]  Occupation: Retired  Tobacco use:   Tobacco Use      Smoking status: Former        Packs/day: 0.50        Years: 20.00        Pack years: 10        Types: Cigarettes        Quit date:         Years since quittin.9      Smokeless tobacco: Never  ETOH use: 2-3 glasses of wine a week  Exercise: getting 150 min of exercise a week  Diet: Healthy diet    Physical exam  There were no vitals taken for this visit.  Wt Readings from Last 4 Encounters:   22 60.4 kg (133 lb 3.2 oz)   22 60.3 kg (132 lb 14.4 oz)   22 60.3 kg (133 lb)   22 59.9 kg (132 lb)   General: No acute distress  HEENT: Sclera anicteric.  Lymph: No lymphadenopathy in neck, supraclavicular, and axillary areas  Heart: Regular, rate, and rhythm  Lungs: Clear to ascultation bilaterally  Breasts: s/p lumpectomy and sentinel LN biopsy on the right. Nipples flat at baseline. No palpable masses.   Extremities: no lower extremity edema  Neuro: Cranial nerves grossly intact    IMAGING:  Examinations: TEMPORARY, 12/15/2022 10:47 AM     History/Family History: Right breast cancer status post breast  conservation therapy.     Breast Density: Scattered fibroglandular densities  Technique: Standard mammographic views were performed  with  tomosynthesis and 2D reconstruction.     Comparisons: 4/8/2022, 3/28/2022, 3/24/2022, 3/17/2022, 7/7/2020,  4/20/1990     Findings:     Mammogram:  Breast conservation therapy finding in the right breast. No  significant change.                                                                      IMPRESSION: BI-RADS CATEGORY: 2 - Benign.     RECOMMENDED FOLLOW-UP: Annual Mammography.     The patient was given the results of the examination.     I have personally reviewed the examination and initial interpretation  and I agree with the findings.     THOMPSON AMATO MD     IMPRESSION/PLAN:    History of right-sided stage IA breast cancer, ER/NC positive, HER2 negative, diagnosed in 2022  Treated with lumpectomy, radiation and she is now on AI therapy.   We discussed surveillance which includes H&P every 3-4 months for the first 3 years then every 6 months through year 5. Plan is 5 years of endocrine therapy. After 5 years she can discharge to her PCP or I can follow her in survivorship clinic.   Annual mammogram (done today).     Potential late effects related to surgery/radiation:  -Risk of lymphedema: If she notices any swelling in her arm, she should be offered a referral to lymphedema physical therapy.     Potential late effects related to radiation:  - Possible radiation side effects include cardiotoxicity, lung injury, fracture, and second malignancies. She should seek medical attention if she notices any new skin lesions in the area of radiation. If she should develop any shortness of breath, hemoptysis, cough, or new pain, I would advise further evaluation with CT or X-ray.     Potential effects related to endocrine therapy:  Risk of developing osteoporosis: Last DEXA 03/2022 with a lowest T-score of -2.0, Recommend weightbearing exercises 2-3 days per week, and to supplement with 1200 mg of calcium, 1000 international units of vitamin D daily.9/2022 she started Zometa with a plan for every 6 months for 3  years to prevent bone loss on aromatase inhibitor therapy and also reduce the risk of breast cancer bone metastasis    General late effects screenings and recommendations  -Coping: Offered referral to health psychology, she will let me know if she would like a referral. Gave resources ([facility]/Conversations with Richelle).     -Sexuality concerns. She has had issues with vaginal dryness. Recommended a vaginal moisturizer (water, plant-oil, or hyaluranic acid). and lubricant for intercourse. I gave her some samples of Good Clean Love products today. Not overly bothersome to her but could consider topical estrogen if non-pharmacologic management fails.     -Cancer screening. She should undergo routine screening for women in her age group.     -Healthy lifestyle. She should maintain a healthy weight with a BMI between 20-25. She should exercise at least 150 minutes weekly at moderate intensity. She should see the eye doctor every 1-2 years, and dentist every 6 months for cleanings. She should not use any tobacco. She should minimize alcohol intake. If continuing to drink, should follow CDC recommendations for no more than 1 alcoholic drink/day for women.    Non-urgent scheduling should be complete within 7-10 business days. However, if you have not received a phone call from scheduling within 3 business days, you may call to schedule at [phone] option 5, option 2. This is the same number to call to make changes tor if you have questions about the schedule.    Gave resources for our [facility] Cancer Survivorship class series and mailings list for educational opportunities. [email or web address]    Cancer treatment summary was sent electronically to the patient and her PCP.      The total time of this encounter amounted to 30 minutes.This time included time spent with the patient, prep work, ordering tests, creating a cancer treatment summary, and performing post visit  documentation.    Sarah Rosenberg MPAS, PALakshmiC  [facility] Cancer Survivorship Progam

## 2022-12-15 ENCOUNTER — ONCOLOGY SURVIVORSHIP VISIT (OUTPATIENT)
Dept: ONCOLOGY | Facility: CLINIC | Age: 67
End: 2022-12-15
Attending: PHYSICIAN ASSISTANT
Payer: MEDICARE

## 2022-12-15 ENCOUNTER — ANCILLARY PROCEDURE (OUTPATIENT)
Dept: MAMMOGRAPHY | Facility: CLINIC | Age: 67
End: 2022-12-15
Attending: INTERNAL MEDICINE
Payer: MEDICARE

## 2022-12-15 DIAGNOSIS — Z17.0 MALIGNANT NEOPLASM OF UPPER-OUTER QUADRANT OF RIGHT BREAST IN FEMALE, ESTROGEN RECEPTOR POSITIVE (H): Primary | ICD-10-CM

## 2022-12-15 DIAGNOSIS — C50.411 MALIGNANT NEOPLASM OF UPPER-OUTER QUADRANT OF RIGHT BREAST IN FEMALE, ESTROGEN RECEPTOR POSITIVE (H): ICD-10-CM

## 2022-12-15 DIAGNOSIS — C50.411 MALIGNANT NEOPLASM OF UPPER-OUTER QUADRANT OF RIGHT BREAST IN FEMALE, ESTROGEN RECEPTOR POSITIVE (H): Primary | ICD-10-CM

## 2022-12-15 DIAGNOSIS — Z17.0 MALIGNANT NEOPLASM OF UPPER-OUTER QUADRANT OF RIGHT BREAST IN FEMALE, ESTROGEN RECEPTOR POSITIVE (H): ICD-10-CM

## 2022-12-15 PROCEDURE — G0463 HOSPITAL OUTPT CLINIC VISIT: HCPCS

## 2022-12-15 PROCEDURE — 99214 OFFICE O/P EST MOD 30 MIN: CPT | Performed by: PHYSICIAN ASSISTANT

## 2022-12-15 PROCEDURE — 77066 DX MAMMO INCL CAD BI: CPT | Performed by: RADIOLOGY

## 2022-12-15 PROCEDURE — G0279 TOMOSYNTHESIS, MAMMO: HCPCS | Performed by: RADIOLOGY

## 2022-12-15 NOTE — LETTER
12/15/2022         RE: Elisha Gao  [address] [address]        Dear Colleague,    Thank you for referring your patient, Elisha Gao, to the [facility]. Please see a copy of my visit note below.    CANCER SURVIVORSHIP VISIT-End of treatment visit  Dec 15, 2022    Care Team   Primary Care Provider Debi Pizarro   Surgeon  Radha, Santy JEREZ MD   Radiation Oncologist Anny Sánchez MD   Medical Oncologist  Rosaura, Gardenia Messer MD       REASON FOR VISIT: survivorship visit for history of breast cancer    CANCER HISTORY AND TREATMENT:    History of right-sided stage IA breast cancer, ER/NH positive, HER2 negative, diagnosed in   *Diagnosis: 3/28/2022. Abnormal screening mammogram.   *Surgery: Right breast lumpectomy and right axillary sentinel lymph node biopsy 2022.  Invasive ductal carcinoma; Stage IA (cT1b, cN0, cM0, G2, ER+, NH+, HER2-) Oncotype DX score 18  *Radiation: She completed radiation, 2600 cGy in 5 fractions, to the right breast on 2022.   *Endocrine therapy: Anastrozole 6/15/2022-current  *Genetic testin2022 Negative BRCA1/BRCA2.    INTERVAL HISTORY:     Elisha is here for an end of treatment visit referred by Dr. Kaplan.     Anastrazole- no issues.     Zometa infusion went well, mild fatigue    No new lumps/bumps or new pain.     Late effects: no lymphedema or ROM difficulty    Mood: no concerns but did recently lose her mother and her horse. Coping ok.     Sexual health: no concerns. Hysterectomy/oophorectomy in 30's. Does have some vaginal dryness.         Past Medical History:   Diagnosis Date     Breast cancer (H) 2022     Chronic low back pain     for a couple years, worse at night, not evaluated     Depression      Invasive ductal carcinoma of breast, female, right (H)      Osteopenia     FRAX  %          Current Outpatient Medications   Medication Sig Dispense Refill     anastrozole (ARIMIDEX) 1  MG tablet Take 1 tablet (1 mg) by mouth daily 90 tablet 3     buPROPion (WELLBUTRIN XL) 300 MG 24 hr tablet Take 1 tablet (300 mg) by mouth every morning 90 tablet 3     citalopram (CELEXA) 40 MG tablet Take 1 tablet (40 mg) by mouth daily 90 tablet 3     magnesium 250 MG tablet Take 1 tablet by mouth every morning        Vitamin D, Cholecalciferol, 25 MCG (1000 UT) TABS Take 3 tablets by mouth every morning             Allergies   Allergen Reactions     Alendronic Acid Nausea and Vomiting     Raloxifene Nausea     Risedronate Nausea and Vomiting       Family History   Problem Relation Age of Onset     Colon Cancer Mother         75     Lung Cancer Mother         90     Macular Degeneration Mother      Dementia Mother      Heart Disease Father         62     Bipolar Disorder Brother      Glaucoma No family hx of      Anesthesia Reaction No family hx of      Thrombosis No family hx of         Social history:  Living situation:  (wife), lives in [facility]  Occupation: Retired  Tobacco use:   Tobacco Use      Smoking status: Former        Packs/day: 0.50        Years: 20.00        Pack years: 10        Types: Cigarettes        Quit date:         Years since quittin.9      Smokeless tobacco: Never  ETOH use: 2-3 glasses of wine a week  Exercise: getting 150 min of exercise a week  Diet: Healthy diet    Physical exam  There were no vitals taken for this visit.  Wt Readings from Last 4 Encounters:   22 60.4 kg (133 lb 3.2 oz)   22 60.3 kg (132 lb 14.4 oz)   22 60.3 kg (133 lb)   22 59.9 kg (132 lb)   General: No acute distress  HEENT: Sclera anicteric.  Lymph: No lymphadenopathy in neck, supraclavicular, and axillary areas  Heart: Regular, rate, and rhythm  Lungs: Clear to ascultation bilaterally  Breasts: s/p lumpectomy and sentinel LN biopsy on the right. Nipples flat at baseline. No palpable masses.   Extremities: no lower extremity edema  Neuro: Cranial nerves grossly  intact    IMAGING:  Examinations: TEMPORARY, 12/15/2022 10:47 AM     History/Family History: Right breast cancer status post breast  conservation therapy.     Breast Density: Scattered fibroglandular densities  Technique: Standard mammographic views were performed with  tomosynthesis and 2D reconstruction.     Comparisons: 4/8/2022, 3/28/2022, 3/24/2022, 3/17/2022, 7/7/2020,  4/20/1990     Findings:     Mammogram:  Breast conservation therapy finding in the right breast. No  significant change.                                                                      IMPRESSION: BI-RADS CATEGORY: 2 - Benign.     RECOMMENDED FOLLOW-UP: Annual Mammography.     The patient was given the results of the examination.     I have personally reviewed the examination and initial interpretation  and I agree with the findings.     THOMPSON AMATO MD     IMPRESSION/PLAN:    History of right-sided stage IA breast cancer, ER/AZ positive, HER2 negative, diagnosed in 2022  Treated with lumpectomy, radiation and she is now on AI therapy.   We discussed surveillance which includes H&P every 3-4 months for the first 3 years then every 6 months through year 5. Plan is 5 years of endocrine therapy. After 5 years she can discharge to her PCP or I can follow her in survivorship clinic.   Annual mammogram (done today).     Potential late effects related to surgery/radiation:  -Risk of lymphedema: If she notices any swelling in her arm, she should be offered a referral to lymphedema physical therapy.     Potential late effects related to radiation:  - Possible radiation side effects include cardiotoxicity, lung injury, fracture, and second malignancies. She should seek medical attention if she notices any new skin lesions in the area of radiation. If she should develop any shortness of breath, hemoptysis, cough, or new pain, I would advise further evaluation with CT or X-ray.     Potential effects related to endocrine therapy:  Risk of developing  osteoporosis: Last DEXA 03/2022 with a lowest T-score of -2.0, Recommend weightbearing exercises 2-3 days per week, and to supplement with 1200 mg of calcium, 1000 international units of vitamin D daily.9/2022 she started Zometa with a plan for every 6 months for 3 years to prevent bone loss on aromatase inhibitor therapy and also reduce the risk of breast cancer bone metastasis    General late effects screenings and recommendations  -Coping: Offered referral to health psychology, she will let me know if she would like a referral. Gave resources (Thrive/Conversations with Richelle).     -Sexuality concerns. She has had issues with vaginal dryness. Recommended a vaginal moisturizer (water, plant-oil, or hyaluranic acid). and lubricant for intercourse. I gave her some samples of Good Clean Love products today. Not overly bothersome to her but could consider topical estrogen if non-pharmacologic management fails.     -Cancer screening. She should undergo routine screening for women in her age group.     -Healthy lifestyle. She should maintain a healthy weight with a BMI between 20-25. She should exercise at least 150 minutes weekly at moderate intensity. She should see the eye doctor every 1-2 years, and dentist every 6 months for cleanings. She should not use any tobacco. She should minimize alcohol intake. If continuing to drink, should follow CDC recommendations for no more than 1 alcoholic drink/day for women.    Non-urgent scheduling should be complete within 7-10 business days. However, if you have not received a phone call from scheduling within 3 business days, you may call to schedule at [phone] option 5, option 2. This is the same number to call to make changes tor if you have questions about the schedule.    Gave resources for our [facility] Cancer Survivorship class series and mailings list for educational opportunities. [email or web address]    Cancer  treatment summary was sent electronically to the patient and her PCP.      The total time of this encounter amounted to 30 minutes.This time included time spent with the patient, prep work, ordering tests, creating a cancer treatment summary, and performing post visit documentation.    MARGE Torres, RITIKA  [facility] Cancer Survivorship Progam        Again, thank you for allowing me to participate in the care of your patient.        Sincerely,        Sarah Rosenberg PA-C

## 2022-12-16 VITALS
SYSTOLIC BLOOD PRESSURE: 122 MMHG | OXYGEN SATURATION: 98 % | HEART RATE: 66 BPM | TEMPERATURE: 98.2 F | RESPIRATION RATE: 16 BRPM | DIASTOLIC BLOOD PRESSURE: 71 MMHG

## 2022-12-16 ASSESSMENT — PAIN SCALES - GENERAL: PAINLEVEL: NO PAIN (0)

## 2022-12-28 ENCOUNTER — MYC MEDICAL ADVICE (OUTPATIENT)
Dept: INTERNAL MEDICINE | Facility: CLINIC | Age: 67
End: 2022-12-28

## 2022-12-28 DIAGNOSIS — F32.4 MAJOR DEPRESSIVE DISORDER IN PARTIAL REMISSION, UNSPECIFIED WHETHER RECURRENT (H): ICD-10-CM

## 2022-12-28 RX ORDER — BUPROPION HYDROCHLORIDE 300 MG/1
TABLET ORAL
Qty: 90 TABLET | Refills: 0 | OUTPATIENT
Start: 2022-12-28

## 2022-12-28 RX ORDER — BUPROPION HYDROCHLORIDE 300 MG/1
300 TABLET ORAL EVERY MORNING
Qty: 90 TABLET | Refills: 0 | Status: SHIPPED | OUTPATIENT
Start: 2022-12-28 | End: 2023-02-10

## 2022-12-28 NOTE — TELEPHONE ENCOUNTER
Pt last saw Dr. Pizarro on 3/2/2022, and Wellbutrin was last ordered on 6/17/2022 for 90 tablets with 3 refills. Pt should have refill coverage until 3/14/2023 with existing order. RN called pharmacy and spoke with pharmacy staff. Pharmacy staff at [facility], the pharmacy pt is requesting, never received the 6/17/22 order and are requesting refills sent to them. RN sent order for Wellbutrin to [facility] for patient.     EDGAR KEITH RN on 12/28/2022 at 4:16 PM

## 2023-01-16 ENCOUNTER — TELEPHONE (OUTPATIENT)
Dept: OPHTHALMOLOGY | Facility: CLINIC | Age: 68
End: 2023-01-16

## 2023-01-16 NOTE — TELEPHONE ENCOUNTER
[facility]    Phone Message    May a detailed message be left on voicemail: yes     Reason for Call: Symptoms or Concerns     If patient has red-flag symptoms, warm transfer to triage line    Current symptom or concern: Flashing and blurred vision. Patient states it comes and goes.    Symptoms have been present for:  Comes and goes.     Has patient previously been seen for this? No    By: NA    Date: NA    Are there any new or worsening symptoms? Yes:       Action Taken: Other: Eye    Travel Screening: Not Applicable

## 2023-02-02 ENCOUNTER — OFFICE VISIT (OUTPATIENT)
Dept: OPHTHALMOLOGY | Facility: CLINIC | Age: 68
End: 2023-02-02
Attending: OPHTHALMOLOGY
Payer: MEDICARE

## 2023-02-02 DIAGNOSIS — H33.312 RETINAL TEAR OF LEFT EYE: ICD-10-CM

## 2023-02-02 DIAGNOSIS — Z98.890 S/P LASIK (LASER ASSISTED IN SITU KERATOMILEUSIS) OF BOTH EYES: ICD-10-CM

## 2023-02-02 DIAGNOSIS — H26.9 INCIPIENT CATARACT OF BOTH EYES: ICD-10-CM

## 2023-02-02 DIAGNOSIS — H43.313 VITREOUS STRANDS OF BOTH EYES: Primary | ICD-10-CM

## 2023-02-02 DIAGNOSIS — H18.513 FUCHS' CORNEAL DYSTROPHY OF BOTH EYES: ICD-10-CM

## 2023-02-02 PROCEDURE — G0463 HOSPITAL OUTPT CLINIC VISIT: HCPCS | Mod: 25

## 2023-02-02 PROCEDURE — 92015 DETERMINE REFRACTIVE STATE: CPT | Mod: GY

## 2023-02-02 PROCEDURE — 92004 COMPRE OPH EXAM NEW PT 1/>: CPT | Performed by: OPHTHALMOLOGY

## 2023-02-02 ASSESSMENT — REFRACTION_MANIFEST
OS_ADD: +2.50
OD_CYLINDER: +1.50
OD_SPHERE: -0.25
OS_SPHERE: +0.25
OD_ADD: +2.50
OS_CYLINDER: +0.50
OD_AXIS: 180
OS_AXIS: 170

## 2023-02-02 ASSESSMENT — CONF VISUAL FIELD
METHOD: COUNTING FINGERS
OD_SUPERIOR_NASAL_RESTRICTION: 0
OD_INFERIOR_NASAL_RESTRICTION: 0
OS_SUPERIOR_NASAL_RESTRICTION: 0
OS_NORMAL: 1
OS_SUPERIOR_TEMPORAL_RESTRICTION: 0
OS_INFERIOR_NASAL_RESTRICTION: 0
OD_NORMAL: 1
OS_INFERIOR_TEMPORAL_RESTRICTION: 0
OD_SUPERIOR_TEMPORAL_RESTRICTION: 0
OD_INFERIOR_TEMPORAL_RESTRICTION: 0

## 2023-02-02 ASSESSMENT — REFRACTION_WEARINGRX
OS_CYLINDER: +0.75
OD_SPHERE: -0.25
OS_SPHERE: +0.25
OS_ADD: +2.50
OS_AXIS: 165
OD_AXIS: 004
SPECS_TYPE: PAL
OD_CYLINDER: +1.50
OD_ADD: +2.50

## 2023-02-02 ASSESSMENT — VISUAL ACUITY
METHOD: SNELLEN - LINEAR
CORRECTION_TYPE: GLASSES
OS_CC+: -3
OD_CC: 20/20
OS_CC: 20/20

## 2023-02-02 ASSESSMENT — EXTERNAL EXAM - RIGHT EYE: OD_EXAM: NORMAL

## 2023-02-02 ASSESSMENT — SLIT LAMP EXAM - LIDS
COMMENTS: 1+ MGD
COMMENTS: 1+ MGD

## 2023-02-02 ASSESSMENT — CUP TO DISC RATIO
OD_RATIO: 0.3
OS_RATIO: 0.3

## 2023-02-02 ASSESSMENT — TONOMETRY
OD_IOP_MMHG: 11
OS_IOP_MMHG: 12
IOP_METHOD: ICARE

## 2023-02-02 ASSESSMENT — EXTERNAL EXAM - LEFT EYE: OS_EXAM: NORMAL

## 2023-02-02 NOTE — PROGRESS NOTES
Chief Complaint(s) and History of Present Illness(es)     Floaters Left Eye            Laterality: left eye    Quality: large    Associated symptoms: Negative for flashes          Comments    Here for ongoing floater left eye with worsening vision since her last eye   exam. Vision in the right is doing fine. Uses lubricating drops. No   flashes. No pain.    Contreras PIEDRA 8:32 AM February 2, 2023                Review of systems for the eyes was negative other than the pertinent positives/negatives listed in the HPI.      Assessment & Plan      Elisha Gao is a 67 year old female with the following diagnoses:   1. Vitreous strands of both eyes    2. Fuchs' corneal dystrophy of both eyes    3. S/P LASIK (laser assisted in situ keratomileusis) of both eyes    4. Incipient cataract of both eyes    5. Retinal tear of left eye         New patient here for dilated fundus exam.  Bothered by diffuse floaters left eye > right eye for a number of years.  S/P laser in situ keratomileusis (LASIK) both eyes ~30 years ago    Posterior vitreous detachment (PVD) both eyes   [place] tear that self sealed left eye   Discussed benign nature of floaters and potential treatment options  Interested in trial of dilute atropine  Rx sent.  Can use at bedtime both eyes as needed   Discussed symptoms of retinal tear/detachment and the need to be seen urgently should they occur     New diagnosis of Fuch's discussed  Not visually significant   Return precautions reviewed     Refractive options reviewed  Refraction given           Patient disposition:   Return in about 1 year (around 2/2/2024) for DFE.           Attending Physician Attestation:  Complete documentation of historical and exam elements from today's encounter can be found in the full encounter summary report (not reduplicated in this progress note).  I personally obtained the chief complaint(s) and history of present illness.  I confirmed and edited as necessary the review of  systems, past medical/surgical history, family history, social history, and examination findings as documented by others; and I examined the patient myself.  I personally reviewed the relevant tests, images, and reports as documented above.  I formulated and edited as necessary the assessment and plan and discussed the findings and management plan with the patient and family. . - Leander Oh MD

## 2023-02-02 NOTE — NURSING NOTE
Chief Complaints and History of Present Illnesses   Patient presents with     Floaters Left Eye     Chief Complaint(s) and History of Present Illness(es)     Floaters Left Eye            Laterality: left eye    Quality: large    Associated symptoms: Negative for flashes          Comments    Here for ongoing floater left eye with worsening vision since her last eye exam. Vision in the right is doing fine. Uses lubricating drops. No flashes. No pain.    Contreras Whiteside COT 8:32 AM February 2, 2023

## 2023-02-09 ENCOUNTER — MYC MEDICAL ADVICE (OUTPATIENT)
Dept: INTERNAL MEDICINE | Facility: CLINIC | Age: 68
End: 2023-02-09
Payer: MEDICARE

## 2023-02-09 ASSESSMENT — ENCOUNTER SYMPTOMS
JOINT SWELLING: 0
EYE WATERING: 0
PARALYSIS: 0
ARTHRALGIAS: 0
DISTURBANCES IN COORDINATION: 1
STIFFNESS: 0
FATIGUE: 1
LOSS OF CONSCIOUSNESS: 0
TINGLING: 0
EYE REDNESS: 0
WEAKNESS: 1
MYALGIAS: 1
HEADACHES: 0
EYE IRRITATION: 0
SPEECH CHANGE: 0
BACK PAIN: 1
DOUBLE VISION: 0
MUSCLE WEAKNESS: 1
TREMORS: 1
MUSCLE CRAMPS: 0
NECK PAIN: 0
SEIZURES: 0
EYE PAIN: 0
MEMORY LOSS: 0
NUMBNESS: 0
INCREASED ENERGY: 1
DIZZINESS: 1

## 2023-02-10 ENCOUNTER — OFFICE VISIT (OUTPATIENT)
Dept: INTERNAL MEDICINE | Facility: CLINIC | Age: 68
End: 2023-02-10
Payer: MEDICARE

## 2023-02-10 VITALS
WEIGHT: 134.1 LBS | BODY MASS INDEX: 22.32 KG/M2 | OXYGEN SATURATION: 100 % | HEART RATE: 63 BPM | SYSTOLIC BLOOD PRESSURE: 137 MMHG | DIASTOLIC BLOOD PRESSURE: 83 MMHG

## 2023-02-10 DIAGNOSIS — G25.0 ESSENTIAL TREMOR: Primary | ICD-10-CM

## 2023-02-10 DIAGNOSIS — F32.4 MAJOR DEPRESSIVE DISORDER IN PARTIAL REMISSION, UNSPECIFIED WHETHER RECURRENT (H): ICD-10-CM

## 2023-02-10 PROCEDURE — 99214 OFFICE O/P EST MOD 30 MIN: CPT | Performed by: INTERNAL MEDICINE

## 2023-02-10 RX ORDER — BUPROPION HYDROCHLORIDE 300 MG/1
300 TABLET ORAL EVERY MORNING
Qty: 90 TABLET | Refills: 0 | Status: SHIPPED | OUTPATIENT
Start: 2023-02-10 | End: 2023-04-08

## 2023-02-10 RX ORDER — PROPRANOLOL HYDROCHLORIDE 10 MG/1
10-20 TABLET ORAL 2 TIMES DAILY
Qty: 120 TABLET | Refills: 1 | Status: SHIPPED | OUTPATIENT
Start: 2023-02-10 | End: 2023-05-01

## 2023-02-10 RX ORDER — CITALOPRAM HYDROBROMIDE 40 MG/1
40 TABLET ORAL DAILY
Qty: 90 TABLET | Refills: 0 | Status: SHIPPED | OUTPATIENT
Start: 2023-02-10 | End: 2023-07-12

## 2023-02-10 ASSESSMENT — ANXIETY QUESTIONNAIRES
1. FEELING NERVOUS, ANXIOUS, OR ON EDGE: NOT AT ALL
7. FEELING AFRAID AS IF SOMETHING AWFUL MIGHT HAPPEN: NOT AT ALL
3. WORRYING TOO MUCH ABOUT DIFFERENT THINGS: NOT AT ALL
5. BEING SO RESTLESS THAT IT IS HARD TO SIT STILL: NOT AT ALL
2. NOT BEING ABLE TO STOP OR CONTROL WORRYING: NOT AT ALL
GAD7 TOTAL SCORE: 0
6. BECOMING EASILY ANNOYED OR IRRITABLE: NOT AT ALL
GAD7 TOTAL SCORE: 0

## 2023-02-10 ASSESSMENT — PATIENT HEALTH QUESTIONNAIRE - PHQ9
5. POOR APPETITE OR OVEREATING: NOT AT ALL
SUM OF ALL RESPONSES TO PHQ QUESTIONS 1-9: 2

## 2023-02-10 NOTE — NURSING NOTE
Elisha Gao is a 67 year old female that presents in clinic today for the following:     Chief Complaint   Patient presents with     Tremors     Pt here due to overall increase in tremors       The patient's allergies and medications were reviewed. The patient's vitals were obtained without incident. The patient does not have any other questions for the provider.     Ashly Whiteside, EMT at 8:50 AM on 2/10/2023.  Primary Care Clinic: [phone]

## 2023-02-10 NOTE — PROGRESS NOTES
History of Present Illness:  Ms. Gao is a 67 year old female who presents for  Chief Complaint   Patient presents with     Tremors     Pt here due to overall increase in tremors       3/22 diagnosed with R breast cancer, stage T1b, ER/MT+ Her neg, s/p lumpectomy, XRT and anastrozole, and zometa    Rough winter, mom  96 yo, horse , states mental health is holding up, still on celexa and wellbutrin    Elisha reports tremors, onset more than 10 years ago, bilat hands, worse with action tremor, brothers have tremor, mom did, no family hx of parkinsons  Whole body weaker, some issues with balance, no trouble with head/tongue tremor, no change in writing, no falls  Doesn't feel they are worse with antidepressants      Review of external notes as documented above                   A detailed Review of Systems was performed, verified and is negative except as documented in the HPI.  All health questionnaires were reviewed, verified and relevant information documented above.      Past Medical History:  Past Medical History:   Diagnosis Date     Breast cancer (H) 2022     Chronic low back pain     for a couple years, worse at night, not evaluated     Depression      Invasive ductal carcinoma of breast, female, right (H)      Osteopenia     FRAX  11/2%          Active Meds:  Current Outpatient Medications   Medication     anastrozole (ARIMIDEX) 1 MG tablet     atropine 0.01% ophthalmic solution     buPROPion (WELLBUTRIN XL) 300 MG 24 hr tablet     citalopram (CELEXA) 40 MG tablet     magnesium 250 MG tablet     propranolol (INDERAL) 10 MG tablet     Vitamin D, Cholecalciferol, 25 MCG (1000 UT) TABS     No current facility-administered medications for this visit.        Allergies:  Reviewed, refer to EMR    Relevant Social History:  Social History     Tobacco Use     Smoking status: Former     Packs/day: 0.50     Years: 15.00     Pack years: 7.50     Types: Cigarettes     Start date: 1972     Quit date:  1987     Years since quittin.6     Smokeless tobacco: Never   Substance Use Topics     Alcohol use: Yes     Alcohol/week: 3.0 standard drinks     Types: 3 Glasses of wine per week     Comment: Social     Drug use: Never        Physical Exam:  Vitals: /83 (BP Location: Right arm, Patient Position: Sitting, Cuff Size: Adult Regular)   Pulse 63   Wt 60.8 kg (134 lb 1.6 oz)   SpO2 100%   BMI 22.32 kg/m    Constitutional: Alert, oriented, pleasant, no acute distress  Head: Normocephalic, atraumatic  Eyes: Extra-ocular movements intact, pupils equally round bilaterally, no scleral icterus  Cardiovascular: Regular rate and rhythm, no murmurs, rubs or gallops, peripheral pulses full/symmetric  Musculoskeletal: No edema, normal muscle tone  Neurologic: Alert and oriented, cranial nerves 2-12 intact, grossly non-focal, some ataxia with Romberg, unable to do tandem gait, no sensory loss, normal THEODORA/heel to shin, mild dysmetria with F-N, higher frequency tremor of bilat hands, L>R, worse with action, better with rest, no cogwheeling, no rigidity  Psychiatric: normal mentation, affect and mood      Diagnostics:  Labs reviewed in Epic          Assessment and Plan:  Elisha was seen today for tremors.    Diagnoses and all orders for this visit:    Essential tremor  Seems most c/w ET. Will trial therapy. Discussed possible side effects. Rec follow-up in 2-3 months.  -     propranolol (INDERAL) 10 MG tablet; Take 1-2 tablets (10-20 mg) by mouth 2 times daily    Major depressive disorder in partial remission, unspecified whether recurrent (H)  -     buPROPion (WELLBUTRIN XL) 300 MG 24 hr tablet; Take 1 tablet (300 mg) by mouth every morning  -     citalopram (CELEXA) 40 MG tablet; Take 1 tablet (40 mg) by mouth daily          Debi Pizarro MD  Internal Medicine      >30 minutes spent today performing chart review, history and exam, counseling, care coordination, documentation and further activities as noted above  exclusive of any procedures or EKG interpretation                  Answers for HPI/ROS submitted by the patient on 2/9/2023  General Symptoms: Yes  Skin Symptoms: No  HENT Symptoms: No  EYE SYMPTOMS: Yes  HEART SYMPTOMS: No  LUNG SYMPTOMS: No  INTESTINAL SYMPTOMS: No  URINARY SYMPTOMS: No  GYNECOLOGIC SYMPTOMS: No  BREAST SYMPTOMS: No  SKELETAL SYMPTOMS: Yes  BLOOD SYMPTOMS: No  NERVOUS SYSTEM SYMPTOMS: Yes  MENTAL HEALTH SYMPTOMS: No  Fatigue: Yes  Change in or Loss of Energy: Yes  Eye pain: No  Vision loss: No  Dry eyes: No  Watery eyes: No  Eye bulging: No  Double vision: No  Flashing of lights: No  Spots: No  Floaters: Yes  Redness: No  Crossed eyes: No  Tunnel Vision: No  Yellowing of eyes: No  Eye irritation: No  Back pain: Yes  Muscle aches: Yes  Neck pain: No  Swollen joints: No  Joint pain: No  Bone pain: No  Muscle cramps: No  Muscle weakness: Yes  Joint stiffness: No  Bone fracture: No  Trouble with coordination: Yes  Dizziness or trouble with balance: Yes  Fainting or black-out spells: No  Memory loss: No  Headache: No  Seizures: No  Speech problems: No  Tingling: No  Tremor: Yes  Weakness: Yes  Difficulty walking: No  Paralysis: No  Numbness: No

## 2023-03-05 RX ORDER — HEPARIN SODIUM (PORCINE) LOCK FLUSH IV SOLN 100 UNIT/ML 100 UNIT/ML
5 SOLUTION INTRAVENOUS
Status: CANCELLED | OUTPATIENT
Start: 2023-03-06

## 2023-03-05 RX ORDER — HEPARIN SODIUM,PORCINE 10 UNIT/ML
5 VIAL (ML) INTRAVENOUS
Status: CANCELLED | OUTPATIENT
Start: 2023-03-06

## 2023-03-05 RX ORDER — ZOLEDRONIC ACID 0.04 MG/ML
4 INJECTION, SOLUTION INTRAVENOUS ONCE
Status: CANCELLED | OUTPATIENT
Start: 2023-03-06 | End: 2023-03-13

## 2023-03-06 ENCOUNTER — INFUSION THERAPY VISIT (OUTPATIENT)
Dept: ONCOLOGY | Facility: CLINIC | Age: 68
End: 2023-03-06
Attending: INTERNAL MEDICINE
Payer: MEDICARE

## 2023-03-06 ENCOUNTER — APPOINTMENT (OUTPATIENT)
Dept: LAB | Facility: CLINIC | Age: 68
End: 2023-03-06
Attending: INTERNAL MEDICINE
Payer: MEDICARE

## 2023-03-06 VITALS
SYSTOLIC BLOOD PRESSURE: 131 MMHG | BODY MASS INDEX: 22.75 KG/M2 | DIASTOLIC BLOOD PRESSURE: 81 MMHG | RESPIRATION RATE: 18 BRPM | HEART RATE: 57 BPM | WEIGHT: 136.7 LBS | OXYGEN SATURATION: 97 % | TEMPERATURE: 98.5 F

## 2023-03-06 DIAGNOSIS — C50.411 MALIGNANT NEOPLASM OF UPPER-OUTER QUADRANT OF RIGHT BREAST IN FEMALE, ESTROGEN RECEPTOR POSITIVE (H): Primary | ICD-10-CM

## 2023-03-06 DIAGNOSIS — Z17.0 MALIGNANT NEOPLASM OF UPPER-OUTER QUADRANT OF RIGHT BREAST IN FEMALE, ESTROGEN RECEPTOR POSITIVE (H): Primary | ICD-10-CM

## 2023-03-06 DIAGNOSIS — M62.89 MUSCLE FATIGUE: ICD-10-CM

## 2023-03-06 DIAGNOSIS — M85.89 OSTEOPENIA OF MULTIPLE SITES: ICD-10-CM

## 2023-03-06 LAB
ALBUMIN SERPL BCG-MCNC: 4.1 G/DL (ref 3.5–5.2)
CALCIUM SERPL-MCNC: 9.3 MG/DL (ref 8.8–10.2)
CREAT SERPL-MCNC: 0.85 MG/DL (ref 0.51–0.95)
GFR SERPL CREATININE-BSD FRML MDRD: 74 ML/MIN/1.73M2

## 2023-03-06 PROCEDURE — 96365 THER/PROPH/DIAG IV INF INIT: CPT

## 2023-03-06 PROCEDURE — G0463 HOSPITAL OUTPT CLINIC VISIT: HCPCS | Mod: 25 | Performed by: INTERNAL MEDICINE

## 2023-03-06 PROCEDURE — 82310 ASSAY OF CALCIUM: CPT | Performed by: INTERNAL MEDICINE

## 2023-03-06 PROCEDURE — 36415 COLL VENOUS BLD VENIPUNCTURE: CPT | Performed by: INTERNAL MEDICINE

## 2023-03-06 PROCEDURE — 82565 ASSAY OF CREATININE: CPT | Performed by: INTERNAL MEDICINE

## 2023-03-06 PROCEDURE — 82040 ASSAY OF SERUM ALBUMIN: CPT | Performed by: INTERNAL MEDICINE

## 2023-03-06 PROCEDURE — 258N000003 HC RX IP 258 OP 636: Performed by: INTERNAL MEDICINE

## 2023-03-06 PROCEDURE — 99214 OFFICE O/P EST MOD 30 MIN: CPT | Mod: GC | Performed by: INTERNAL MEDICINE

## 2023-03-06 PROCEDURE — 250N000011 HC RX IP 250 OP 636: Performed by: INTERNAL MEDICINE

## 2023-03-06 RX ORDER — ZOLEDRONIC ACID 0.04 MG/ML
4 INJECTION, SOLUTION INTRAVENOUS ONCE
Status: COMPLETED | OUTPATIENT
Start: 2023-03-06 | End: 2023-03-06

## 2023-03-06 RX ADMIN — ZOLEDRONIC ACID 4 MG: 0.04 INJECTION, SOLUTION INTRAVENOUS at 11:34

## 2023-03-06 RX ADMIN — SODIUM CHLORIDE 250 ML: 9 INJECTION, SOLUTION INTRAVENOUS at 11:34

## 2023-03-06 ASSESSMENT — PAIN SCALES - GENERAL: PAINLEVEL: NO PAIN (0)

## 2023-03-06 NOTE — NURSING NOTE
"Oncology Rooming Note    March 6, 2023 10:14 AM   Elisha Gao is a 67 year old female who presents for:    Chief Complaint   Patient presents with     Blood Draw     Labs drawn via piv placed by RN in lab. VS taken.     Oncology Clinic Visit     Malignant neoplasm of upper-outer quadrant of right breast in female, estrogen receptor positive (H) (Primary Dx)     Initial Vitals: /81 (BP Location: Right arm, Patient Position: Sitting, Cuff Size: Adult Regular)   Pulse 57   Temp 98.5  F (36.9  C) (Oral)   Resp 18   Wt 62 kg (136 lb 11.2 oz)   SpO2 97%   BMI 22.75 kg/m   Estimated body mass index is 22.75 kg/m  as calculated from the following:    Height as of 4/11/22: 1.651 m (5' 5\").    Weight as of this encounter: 62 kg (136 lb 11.2 oz). Body surface area is 1.69 meters squared.  No Pain (0) Comment: Data Unavailable   No LMP recorded. Patient is postmenopausal.  Allergies reviewed: Yes  Medications reviewed: Yes    Medications: Medication refills not needed today.  Pharmacy name entered into [facility]:    [facility] [address] [place] [address] - [address] [place] [address] PHARMACY [place], [place] - [address] [place] [address] [id number]  [place], [place] - [address] [facility] [address]  [place], [place] - [address]    Clinical concerns: Pt is having leg weakness and not sure if that's due to anastrozole. Happens when excersion occurs, legs start shaking. Has been ongoing for 2 months now.       Mare Florez, [facility]            "

## 2023-03-06 NOTE — PATIENT INSTRUCTIONS
Contact Numbers  [facility]: [phone] (for symptom and scheduling needs)    Please call the [facility] Triage line if you experience a temperature greater than or equal to 100.4, shaking chills, have uncontrolled nausea, vomiting and/or diarrhea, dizziness, shortness of breath, chest pain, bleeding, unexplained bruising, or if you have any other new/concerning symptoms, questions or concerns.     If you are having any concerning symptoms or wish to speak to a provider before your next infusion visit, please call your care coordinator or triage to notify them so we can adequately serve you.     If you need a refill on a narcotic prescription or other medication, please call triage before your infusion appointment.           March 2023 Sunday Monday Tuesday Wednesday Thursday Friday Saturday                  1     2     3     4       5     6    LAB PERIPHERAL   9:30 AM   (15 min.)   [facility] LAB DRAW   [facility]    RETURN  10:15 AM   (30 min.)   Gardenia Kaplan MD   [facility]    ONC INFUSION 0.5 HR (30 MIN)  11:30 AM   (30 min.)    ONC INFUSION NURSE   [facility] 7     8     9     10     11       12     13     14     15     16     17     18       19     20     21     22     23     24     25       26     27     28     29     30     31                        April 2023 Sunday Monday Tuesday Wednesday Thursday Friday Saturday                                 1       2     3     4     5     6     7     8       9     10     11     12     13     14     15       16     17     18     19     20     21     22  Happy Birthday!       23     24     25     26     27     28     29       30                                                     Lab Results:  Recent Results (from the past 12 hour(s))   Calcium    Collection Time: 03/06/23  9:54 AM   Result Value Ref Range    Calcium 9.3 8.8 - 10.2 mg/dL   Creatinine     Collection Time: 03/06/23  9:54 AM   Result Value Ref Range    Creatinine 0.85 0.51 - 0.95 mg/dL    GFR Estimate 74 >60 mL/min/1.73m2   Albumin level    Collection Time: 03/06/23  9:54 AM   Result Value Ref Range    Albumin 4.1 3.5 - 5.2 g/dL

## 2023-03-06 NOTE — NURSING NOTE
Chief Complaint   Patient presents with     Blood Draw     Labs drawn via piv placed by RN in lab. VS taken.     Labs drawn from PIV placed by RN. Line flushed with saline. Vitals taken. Pt checked in for appointment(s).    Sylwia Hayes RN

## 2023-03-06 NOTE — PROGRESS NOTES
"Oncology Visit:  Date on this visit: 3/6/2023    Diagnosis: right breast cancer.    Primary Physician: Debi Pizarro     History Of Present Illness:  Ms. Gao is a 67 year old female with right breast cancer.  Routine screening mammogram on 3/17/2022 showed an asymmetry in the upper outer right breast.  Diagnostic breast imaging confirmed a 4 mm spiculated mass at 11:00, 4 cm from the nipple of the right breast.  Biopsy was consistent with a grade 2 invasive ductal carcinoma, ER strong in 98%, MN moderate in 40%, and HER2 negative by FISH and atypical ductal hyperplasia.  Patient underwent right breast lumpectomy and right axillary sentinel lymph node biopsy under the care of Dr. Garcia on 4/11/2022.  Pathology showed a grade 2 invasive ductal carcinoma measuring 9 mm with associated low grade DCIS.  Surgical margins were negative.  There was no lymphovascular invasion and two sentinel lymph nodes were benign.  She completed radiation, 2600 cGy in 5 fractions, to the right breast on 5/25/2022.  She has been on anastrozole since 6/15/2022.    Interval History:  Elisha reports no new complaints today. Feels quite well overall. Reports mild joint pains in her knees bilaterally. Also reports mild \"fatigue\" in her bilateral legs after exertion (riding horses). No pain with exertion in her extremities. No new lumps in her breasts or axillae. No skin changes. Good appetite and activity levels. Active, walks a a lot but no specific weight-bearing exercises. No significant hot flashes. Did not have any noticeable symptoms with her first infusion of zometa back in 9/2022. Denies any swelling in her right UE. Able to move RUE and shoulder without any limitation of movement.     A 14-point review of symptoms was performed and found to be negative except as noted above in HPI.     Past Medical/Surgical History:  Past Medical History:   Diagnosis Date     Breast cancer (H) 03/2022     Chronic low back pain     for a couple years, " worse at night, not evaluated     Depression      Essential tremor      Invasive ductal carcinoma of breast, female, right (H)      osteopenia     FRAX  11/2%   2022   Colonoscopy in 2012 w/o polyps, next due in 2022.    Past Surgical History:   Procedure Laterality Date     HYSTERECTOMY  1987    endometriosis, ovarian cysts, hormones x 10  years     LASIK Bilateral      LUMPECTOMY, BREAST, LOCALIZED USING RADIOFREQUENCY IDENTIFICATION Right 4/11/2022    Procedure: LUMPECTOMY, BREAST, LOCALIZED USING RADIOFREQUENCY IDENTIFICATION, sentinel lymph node biopsy;  Surgeon: Santy Garcia MD;  Location: [facility] OR     Allergies:  Allergies as of 03/06/2023 - Reviewed 02/10/2023   Allergen Reaction Noted     Alendronic acid Nausea and Vomiting 05/26/2010     Raloxifene Nausea 05/26/2010     Risedronate Nausea and Vomiting 05/26/2010     Current Medications:  Current Outpatient Medications   Medication Sig Dispense Refill     anastrozole (ARIMIDEX) 1 MG tablet Take 1 tablet (1 mg) by mouth daily 90 tablet 3     atropine 0.01% ophthalmic solution Place 1 drop into both eyes At Bedtime 10 mL 3     buPROPion (WELLBUTRIN XL) 300 MG 24 hr tablet Take 1 tablet (300 mg) by mouth every morning 90 tablet 0     citalopram (CELEXA) 40 MG tablet Take 1 tablet (40 mg) by mouth daily 90 tablet 0     magnesium 250 MG tablet Take 1 tablet by mouth every morning        propranolol (INDERAL) 10 MG tablet Take 1-2 tablets (10-20 mg) by mouth 2 times daily 120 tablet 1     Vitamin D, Cholecalciferol, 25 MCG (1000 UT) TABS Take 3 tablets by mouth every morning         Family and Social History:  See initial consultation dated 4/4/2022 for further details.  Breast Actionable panel on 4/4/2022 was negative for germline genetic mutation.    Physical Exam:  /81 (BP Location: Right arm, Patient Position: Sitting, Cuff Size: Adult Regular)   Pulse 57   Temp 98.5  F (36.9  C) (Oral)   Resp 18   Wt 62 kg (136 lb 11.2 oz)   SpO2 97%   BMI  22.75 kg/m    General:  Well appearing, well-nourished adult female in NAD.  Alert and oriented x 3.  HEENT:  Normocephalic.  Sclera anicteric.  MMM.    Lymph:  No palpable cervical, supraclavicular, or axillary LAD.  Chest:  CTA bilaterally.  No wheezes or crackles.  CV:  RRR.  Nl S1 and S2.  No m/r/g.  Breast:  Right axillary and right upper outer breast incision are well healed.  Right breast - lumpectomy scar well healed with primary intention.   There are no discretely palpable masses in either breast.  Bilateral nipples are flat and without discharge.  Abd:  Soft/ND.    Ext:  No edema of the bilateral lower extremities.  Palpable distal pulses in both feet, good capillary refill (CR time <3s).   Musculo:  Full ROM of the bilateral upper extremities.  Neuro:  Cranial nerves grossly intact.  Gait stable.  Psych:  Mood and affect appear normal.  Skin:  No visible concerning skin rashes or lesions.    Laboratory/Imaging Studies  3/6/2023 Labs (For today's Zometa):  Serum Ca, Albumin and creatine level are all within normal limits.     ASSESSMENT/PLAN:  66 yo female with stage Ia, [id number], grade 2, ER/WI positive, HER-2 negative invasive ductal carcinoma of the right breast.   She is s/p right breast lumpectomy and right axillary sentinel lymph node procedure.    1.  Right breast cancer:  Ms. Gao is 11 months out from excision of a right breast cancer.  She is on treatment with anastrozole.  Plan is to treat with a total 5 years of endocrine therapy.    Clinical exam today is without concerning findings.  Will schedule return visit in 3 months.  Next screening mammograms will be due around 12/15/2023.    2.  Osteopenia:  DEXA bone density scan 3/17/2022 with a lowest T-score of -2.0 and is consistent with osteopenia.  Zometa 4 mg IV once every 6 months for 3 years, to prevent bone loss on aromatase inhibitor therapy and also reduce the risk of breast cancer bone metastasis was started on 9/14/2022.  No issues  with first dose of Zometa. She will receive C2 today.    3.  Muscle fatigue with exercise:  We discussed although muscle pain is a common side effect, muscle fatigue is not a typical side effect of anastrozole.  Treatment related (surgery, radiation) with associated deconditioning may be contributing.  Encouraged ongoing cardiovascular exercise and add in weights.  If no improvement, would consider referral to cancer rehab.    4.  Follow Up:    - ESTER visit in 3 months.  - labs, visit with Dr. Kaplan, and Zometa infusion in 6 months.    The patient was seen and discussed with attending physician Dr. Gardenia Kaplan MD who agreed with the above history, physical and assessment/plan.     Antonio Irwin   PGY-5 Fellow, Division of Hematology, Oncology and Transplantation  [facility]     Patient was seen and discussed with Dr. Irwin.  The oncology fellow was personally supervised by me during the patient examination. I personally performed a physical exam and the medical decision-making. I made appropriate changes to the documentation and the assessment and plan based on my verification, exam, and medical decision making.    Gardenia Kaplan MD

## 2023-03-06 NOTE — LETTER
"    3/6/2023         RE: Elisha Gao  [address] [address]        Dear Colleague,    Thank you for referring your patient, Elisha Gao, to the [facility]. Please see a copy of my visit note below.    Oncology Visit:  Date on this visit: 3/6/2023    Diagnosis: right breast cancer.    Primary Physician: Debi Pizarro     History Of Present Illness:  Ms. Gao is a 67 year old female with right breast cancer.  Routine screening mammogram on 3/17/2022 showed an asymmetry in the upper outer right breast.  Diagnostic breast imaging confirmed a 4 mm spiculated mass at 11:00, 4 cm from the nipple of the right breast.  Biopsy was consistent with a grade 2 invasive ductal carcinoma, ER strong in 98%, MT moderate in 40%, and HER2 negative by FISH and atypical ductal hyperplasia.  Patient underwent right breast lumpectomy and right axillary sentinel lymph node biopsy under the care of Dr. Garcia on 4/11/2022.  Pathology showed a grade 2 invasive ductal carcinoma measuring 9 mm with associated low grade DCIS.  Surgical margins were negative.  There was no lymphovascular invasion and two sentinel lymph nodes were benign.  She completed radiation, 2600 cGy in 5 fractions, to the right breast on 5/25/2022.  She has been on anastrozole since 6/15/2022.    Interval History:  Elisha reports no new complaints today. Feels quite well overall. Reports mild joint pains in her knees bilaterally. Also reports mild \"fatigue\" in her bilateral legs after exertion (riding horses). No pain with exertion in her extremities. No new lumps in her breasts or axillae. No skin changes. Good appetite and activity levels. Active, walks a a lot but no specific weight-bearing exercises. No significant hot flashes. Did not have any noticeable symptoms with her first infusion of zometa back in 9/2022. Denies any swelling in her right UE. Able to move RUE and shoulder without any limitation of " movement.     A 14-point review of symptoms was performed and found to be negative except as noted above in HPI.     Past Medical/Surgical History:  Past Medical History:   Diagnosis Date     Breast cancer (H) 03/2022     Chronic low back pain     for a couple years, worse at night, not evaluated     Depression      Essential tremor      Invasive ductal carcinoma of breast, female, right (H)      osteopenia     FRAX  11/2%   2022   Colonoscopy in 2012 w/o polyps, next due in 2022.    Past Surgical History:   Procedure Laterality Date     HYSTERECTOMY  1987    endometriosis, ovarian cysts, hormones x 10  years     LASIK Bilateral      LUMPECTOMY, BREAST, LOCALIZED USING RADIOFREQUENCY IDENTIFICATION Right 4/11/2022    Procedure: LUMPECTOMY, BREAST, LOCALIZED USING RADIOFREQUENCY IDENTIFICATION, sentinel lymph node biopsy;  Surgeon: Santy Garcia MD;  Location: [facility] OR     Allergies:  Allergies as of 03/06/2023 - Reviewed 02/10/2023   Allergen Reaction Noted     Alendronic acid Nausea and Vomiting 05/26/2010     Raloxifene Nausea 05/26/2010     Risedronate Nausea and Vomiting 05/26/2010     Current Medications:  Current Outpatient Medications   Medication Sig Dispense Refill     anastrozole (ARIMIDEX) 1 MG tablet Take 1 tablet (1 mg) by mouth daily 90 tablet 3     atropine 0.01% ophthalmic solution Place 1 drop into both eyes At Bedtime 10 mL 3     buPROPion (WELLBUTRIN XL) 300 MG 24 hr tablet Take 1 tablet (300 mg) by mouth every morning 90 tablet 0     citalopram (CELEXA) 40 MG tablet Take 1 tablet (40 mg) by mouth daily 90 tablet 0     magnesium 250 MG tablet Take 1 tablet by mouth every morning        propranolol (INDERAL) 10 MG tablet Take 1-2 tablets (10-20 mg) by mouth 2 times daily 120 tablet 1     Vitamin D, Cholecalciferol, 25 MCG (1000 UT) TABS Take 3 tablets by mouth every morning         Family and Social History:  See initial consultation dated 4/4/2022 for further details.  Breast Actionable panel on  4/4/2022 was negative for germline genetic mutation.    Physical Exam:  /81 (BP Location: Right arm, Patient Position: Sitting, Cuff Size: Adult Regular)   Pulse 57   Temp 98.5  F (36.9  C) (Oral)   Resp 18   Wt 62 kg (136 lb 11.2 oz)   SpO2 97%   BMI 22.75 kg/m    General:  Well appearing, well-nourished adult female in NAD.  Alert and oriented x 3.  HEENT:  Normocephalic.  Sclera anicteric.  MMM.    Lymph:  No palpable cervical, supraclavicular, or axillary LAD.  Chest:  CTA bilaterally.  No wheezes or crackles.  CV:  RRR.  Nl S1 and S2.  No m/r/g.  Breast:  Right axillary and right upper outer breast incision are well healed.  Right breast - lumpectomy scar well healed with primary intention.   There are no discretely palpable masses in either breast.  Bilateral nipples are flat and without discharge.  Abd:  Soft/ND.    Ext:  No edema of the bilateral lower extremities.  Palpable distal pulses in both feet, good capillary refill (CR time <3s).   Musculo:  Full ROM of the bilateral upper extremities.  Neuro:  Cranial nerves grossly intact.  Gait stable.  Psych:  Mood and affect appear normal.  Skin:  No visible concerning skin rashes or lesions.    Laboratory/Imaging Studies  3/6/2023 Labs (For today's Zometa):  Serum Ca, Albumin and creatine level are all within normal limits.     ASSESSMENT/PLAN:  66 yo female with stage Ia, [id number], grade 2, ER/GA positive, HER-2 negative invasive ductal carcinoma of the right breast.   She is s/p right breast lumpectomy and right axillary sentinel lymph node procedure.    1.  Right breast cancer:  Ms. Gao is 11 months out from excision of a right breast cancer.  She is on treatment with anastrozole.  Plan is to treat with a total 5 years of endocrine therapy.    Clinical exam today is without concerning findings.  Will schedule return visit in 3 months.  Next screening mammograms will be due around 12/15/2023.    2.  Osteopenia:  DEXA bone density scan 3/17/2022  with a lowest T-score of -2.0 and is consistent with osteopenia.  Zometa 4 mg IV once every 6 months for 3 years, to prevent bone loss on aromatase inhibitor therapy and also reduce the risk of breast cancer bone metastasis was started on 9/14/2022.  No issues with first dose of Zometa. She will receive C2 today.    3.  Muscle fatigue with exercise:  We discussed although muscle pain is a common side effect, muscle fatigue is not a typical side effect of anastrozole.  Treatment related (surgery, radiation) with associated deconditioning may be contributing.  Encouraged ongoing cardiovascular exercise and add in weights.  If no improvement, would consider referral to cancer rehab.    4.  Follow Up:    - ESTER visit in 3 months.  - labs, visit with Dr. Kaplan, and Zometa infusion in 6 months.    The patient was seen and discussed with attending physician Dr. Gardenia Kaplan MD who agreed with the above history, physical and assessment/plan.     Antonio Irwin   PGY-5 Fellow, Division of Hematology, Oncology and Transplantation  [facility]     Patient was seen and discussed with Dr. Irwin.  The oncology fellow was personally supervised by me during the patient examination. I personally performed a physical exam and the medical decision-making. I made appropriate changes to the documentation and the assessment and plan based on my verification, exam, and medical decision making.    Gardenia Kaplan MD

## 2023-03-06 NOTE — PROGRESS NOTES
Infusion Nursing Note:  Elisha Gao presents today for Cycle 2 Day 1 Zometa.    Patient seen by provider today: Yes: Dr. Kaplan   present during visit today: Not Applicable.    Note: Patient presents to infusion today doing well. No new questions or concerns following her visit with Dr. Kaplan.     Patient confirms she is taking a Calcium and Vitamin D supplement as ordered.     Intravenous Access:  Peripheral IV placed.    Treatment Conditions:  Lab Results   Component Value Date     03/02/2022    POTASSIUM 4.3 03/02/2022    CR 0.85 03/06/2023    JOSE 9.3 03/06/2023    BILITOTAL 0.6 03/02/2022    ALBUMIN 4.1 03/06/2023    ALT 18 03/02/2022    AST 14 03/02/2022     Results reviewed, labs MET treatment parameters, ok to proceed with treatment.    Post Infusion Assessment:  Patient tolerated infusion without incident.  Blood return noted pre and post infusion.  Site patent and intact, free from redness, edema or discomfort.  No evidence of extravasations.  Access discontinued per protocol.     Discharge Plan:   Patient declined prescription refills.  Discharge instructions reviewed with: Patient.  Patient and/or family verbalized understanding of discharge instructions and all questions answered.  [facility] Next appointment not yet scheduled at time of discharge. Patient aware she is to return in 6 months for her next Zometa infusion and will call if she does not hear anything.   Patient discharged in stable condition accompanied by: self.  Departure Mode: Ambulatory.      Gisselle Caal RN

## 2023-03-09 NOTE — PROGRESS NOTES
Elisha Gao comes into clinic today at the request of Dr. Amari Keys for suture removal. 11 sutures placed on 5/28 at [facility]. Sutures were successfully removed from patient's left anterior ankle. Patient tolerated this well. No signs of infection (drainage, erythema, etc). Instructed patient on reasons to return or go to ED such as purulent drainage, erythema, wound dehiscence, uncontrolled bleeding, worsening symptoms, and other signs of infection or improper healing.     This service provided today was under the supervising provider of the day Dr. Amari Almazan, who was available if needed.    Nicole Rogers, EMT at 3:04 PM on 6/4/2021      Nsaids Counseling: NSAID Counseling: I discussed with the patient that NSAIDs should be taken with food. Prolonged use of NSAIDs can result in the development of stomach ulcers.  Patient advised to stop taking NSAIDs if abdominal pain occurs.  The patient verbalized understanding of the proper use and possible adverse effects of NSAIDs.  All of the patient's questions and concerns were addressed.

## 2023-04-07 ENCOUNTER — MYC REFILL (OUTPATIENT)
Dept: INTERNAL MEDICINE | Facility: CLINIC | Age: 68
End: 2023-04-07
Payer: MEDICARE

## 2023-04-07 DIAGNOSIS — F32.4 MAJOR DEPRESSIVE DISORDER IN PARTIAL REMISSION, UNSPECIFIED WHETHER RECURRENT (H): ICD-10-CM

## 2023-04-07 RX ORDER — CITALOPRAM HYDROBROMIDE 40 MG/1
40 TABLET ORAL DAILY
Qty: 90 TABLET | Refills: 0 | Status: CANCELLED | OUTPATIENT
Start: 2023-04-07

## 2023-04-07 NOTE — TELEPHONE ENCOUNTER
Pt was last seen in clinic on 2/10/23. Pt last had citalopram (CELEXA) 40 MG tablet refilled on 2/10/23 for a 90 day supply by PCP. Due for refill 5/11/23. Too soon for refill, request refused.     EDGAR KEITH RN on 4/7/2023 at 2:33 PM

## 2023-04-08 ENCOUNTER — HEALTH MAINTENANCE LETTER (OUTPATIENT)
Age: 68
End: 2023-04-08

## 2023-04-08 ENCOUNTER — MYC REFILL (OUTPATIENT)
Dept: INTERNAL MEDICINE | Facility: CLINIC | Age: 68
End: 2023-04-08
Payer: MEDICARE

## 2023-04-08 DIAGNOSIS — F32.4 MAJOR DEPRESSIVE DISORDER IN PARTIAL REMISSION, UNSPECIFIED WHETHER RECURRENT (H): ICD-10-CM

## 2023-04-10 RX ORDER — BUPROPION HYDROCHLORIDE 300 MG/1
300 TABLET ORAL EVERY MORNING
Qty: 90 TABLET | Refills: 1 | Status: SHIPPED | OUTPATIENT
Start: 2023-04-10 | End: 2023-05-01

## 2023-04-10 NOTE — TELEPHONE ENCOUNTER
buPROPion (WELLBUTRIN XL) 300 MG 24 hr tablet    2/10/2023  [facility] Internal Medicine Debi Ponce MD  Internal Medicine

## 2023-04-10 NOTE — TELEPHONE ENCOUNTER
RN called pharmacy who states that they have this prescription ready to  for patient. RN alerted pt via DEUS message.     EDGAR KEITH RN on 4/10/2023 at 9:33 AM

## 2023-05-01 ENCOUNTER — OFFICE VISIT (OUTPATIENT)
Dept: INTERNAL MEDICINE | Facility: CLINIC | Age: 68
End: 2023-05-01
Payer: MEDICARE

## 2023-05-01 ENCOUNTER — ANCILLARY PROCEDURE (OUTPATIENT)
Dept: GENERAL RADIOLOGY | Facility: CLINIC | Age: 68
End: 2023-05-01
Attending: INTERNAL MEDICINE
Payer: MEDICARE

## 2023-05-01 VITALS
HEIGHT: 65 IN | SYSTOLIC BLOOD PRESSURE: 157 MMHG | HEART RATE: 69 BPM | WEIGHT: 134.5 LBS | OXYGEN SATURATION: 99 % | DIASTOLIC BLOOD PRESSURE: 90 MMHG | BODY MASS INDEX: 22.41 KG/M2

## 2023-05-01 DIAGNOSIS — H93.13 TINNITUS, BILATERAL: ICD-10-CM

## 2023-05-01 DIAGNOSIS — G89.29 CHRONIC PAIN OF RIGHT KNEE: ICD-10-CM

## 2023-05-01 DIAGNOSIS — Z13.220 SCREENING FOR HYPERLIPIDEMIA: ICD-10-CM

## 2023-05-01 DIAGNOSIS — G89.29 CHRONIC PAIN OF LEFT KNEE: ICD-10-CM

## 2023-05-01 DIAGNOSIS — Z00.00 ENCOUNTER FOR MEDICARE ANNUAL WELLNESS EXAM: Primary | ICD-10-CM

## 2023-05-01 DIAGNOSIS — F32.4 MAJOR DEPRESSIVE DISORDER IN PARTIAL REMISSION, UNSPECIFIED WHETHER RECURRENT (H): ICD-10-CM

## 2023-05-01 DIAGNOSIS — Z23 NEED FOR PNEUMOCOCCAL 20-VALENT CONJUGATE VACCINATION: ICD-10-CM

## 2023-05-01 DIAGNOSIS — M25.561 CHRONIC PAIN OF RIGHT KNEE: ICD-10-CM

## 2023-05-01 DIAGNOSIS — M25.562 CHRONIC PAIN OF LEFT KNEE: ICD-10-CM

## 2023-05-01 DIAGNOSIS — H90.3 SENSORY HEARING LOSS, BILATERAL: ICD-10-CM

## 2023-05-01 PROCEDURE — 73565 X-RAY EXAM OF KNEES: CPT | Mod: GC | Performed by: RADIOLOGY

## 2023-05-01 PROCEDURE — G0009 ADMIN PNEUMOCOCCAL VACCINE: HCPCS | Performed by: INTERNAL MEDICINE

## 2023-05-01 PROCEDURE — 99213 OFFICE O/P EST LOW 20 MIN: CPT | Mod: 25 | Performed by: INTERNAL MEDICINE

## 2023-05-01 PROCEDURE — 90677 PCV20 VACCINE IM: CPT | Performed by: INTERNAL MEDICINE

## 2023-05-01 PROCEDURE — 99397 PER PM REEVAL EST PAT 65+ YR: CPT | Mod: 25 | Performed by: INTERNAL MEDICINE

## 2023-05-01 RX ORDER — BUPROPION HYDROCHLORIDE 150 MG/1
150 TABLET ORAL EVERY MORNING
Qty: 90 TABLET | Refills: 1 | Status: SHIPPED | OUTPATIENT
Start: 2023-05-01 | End: 2023-07-14

## 2023-05-01 NOTE — TELEPHONE ENCOUNTER
FUTURE VISIT INFORMATION      FUTURE VISIT INFORMATION:    Date: 6/27/23    Time: 1:10PM    Location: [facility]  REFERRAL INFORMATION:    Referring provider:  Debi Pizarro MD    Referring providers clinic:  [facility].    Reason for visit/diagnosis  Tinnitus, Hearing loss. Referred by Debi Pizarro MD in [facility]. Appt per pt. Recs in [facility]. [facility] confirmed.    RECORDS REQUESTED FROM:       Clinic name Comments Records Status Imaging Status   [facility]  5/1/23- note with Debi Pizarro MD Epic

## 2023-05-01 NOTE — PATIENT INSTRUCTIONS
Try checking your blood pressure at home.  Can do it serially, three times in a row, to overcome measurement anxiety. Can check 1-2 times weekly for a month or so.  Check while you are feeling relaxed, not after heavy exertion or stress.  If consistently over 130/80, please let me know.    Omron digital is a good brand to get.      Reduce dose of wellbutrin to 150 to see if this helps tremor.    See me again for review of blood pressure and knee injections.

## 2023-05-01 NOTE — NURSING NOTE
Elisha Gao is a 68 year old female that presents in clinic today for the following:     Chief Complaint   Patient presents with     Medicare Visit     Pt here for medicare wellness.   Concerns:  Pt has buzzing in ears (bilaterally)  Pt states increased loss of hearing in R ear  Pt is experiencing knee pain- arthritis.        The patient's allergies and medications were reviewed. The patient's vitals were obtained without incident. The patient does not have any other questions for the provider.     Angie Burrell, PRECIOUS at 7:51 AM on 5/1/2023.  Primary Care Clinic: [phone]

## 2023-05-01 NOTE — PROGRESS NOTES
History of Present Illness:  Ms. Gao is a 68 year old female who presents for  Chief Complaint   Patient presents with     Medicare Visit     Pt here for medicare wellness.   Concerns:  Pt has buzzing in ears (bilaterally)  Pt states increased loss of hearing in R ear  Pt is experiencing knee pain- arthritis.        3/22 diagnosed with R breast cancer, stage T1b, ER/[place]+ Her neg, s/p lumpectomy, XRT and anastrozole, and zometa    Sudden decrease in hearing on R, notable, no trigger, she has some bilat tinnitis, no pain    Bilat knee pain, worse in last month, hard to go up/down stairs, no injuries, no falls, feels bone on bone  3/16:  IMPRESSION:     1. Mild-moderate patellofemoral compartment arthrosis, including irregular partial-thickness cartilage loss and full-thickness chondral fissuring along the inferior margin of the lateral patellar facet, with mild-moderate underlying reactive bone marrow   edema and subchondral cystic changes measuring up to 2 x 3 mm in size.     2. Horizontal tearing through the medial meniscus body and posterior horn. No displaced medial meniscus flaps are identified.     3. Small joint effusion with synovitis. No discrete osteochondral loose body is identified.    Tremor is present bilat hands, gets worse during day, both parents, brothers had tremors, states propranolol caused nausea so she could not take    Rough winter, mom  98 yo, horse , states mental health is holding up, still on celexa and wellbutrin      The 10-year ASCVD risk score (Fran SÁNCHEZ, et al., 2019) is: 12.1%    Values used to calculate the score:      Age: 68 years      Sex: Female      Is Non- : No      Diabetic: No      Tobacco smoker: No      Systolic Blood Pressure: 157 mmHg      Is BP treated: No      HDL Cholesterol: 72 mg/dL      Total Cholesterol: 222 mg/dL        Review of external notes as documented above                   A detailed Review of Systems was performed,  verified and is negative except as documented in the HPI.  All health questionnaires were reviewed, verified and relevant information documented above.    Patient Care Team       Relationship Specialty Notifications Start End    Debi Pizarro MD PCP - General Internal Medicine  4/4/19     Phone: [phone] Fax: [phone]         [address] [place] [address]    Debi Pizarro MD Assigned PCP   1/17/21     Phone: [phone] Fax: [phone]         [address] [place] [address]    Elisha Love, RASHI Specialty Care Coordinator Hematology & Oncology Admissions 4/11/22 4/4/24    Phone: [phone] Fax: [phone]        Gardenia Kaplan MD Assigned Cancer Care Provider   3/18/23     Phone: [phone] Fax: [phone]         [address] [place] [address]    Lissy Broussard MD MD Dermatology  4/6/23     Phone: [phone] Pager: [phone] Fax: [phone]         DERMATOLOGY [address] [place], [place] [id number] [place] [address]    Leander Oh MD Assigned Surgical Provider   4/29/23     Phone: [phone] Fax: [phone]         [address] [place] [address]    Debi Pizarro MD Referring Physician Internal Medicine  5/1/23     To ENT/Audiology    Phone: [phone] Fax: [phone]         [address] [place] [address]    Esther Souza NP Nurse Practitioner ENT-Otolaryngology  5/1/23     Phone: [phone] Fax: [phone]         [address] [place] [address]            Past Medical History:  Past Medical History:   Diagnosis Date     Breast cancer (H) 03/2022     Chronic low back pain     for a couple years, worse at night, not evaluated     Depression      Essential tremor      Invasive ductal carcinoma of breast, female, right (H)      osteopenia     FRAX  11/2%   2022       Active Meds:  Current Outpatient Medications   Medication     anastrozole (ARIMIDEX) 1 MG tablet     buPROPion  "(WELLBUTRIN XL) 300 MG 24 hr tablet     citalopram (CELEXA) 40 MG tablet     magnesium 250 MG tablet     Vitamin D, Cholecalciferol, 25 MCG (1000 UT) TABS     atropine 0.01% ophthalmic solution     propranolol (INDERAL) 10 MG tablet     No current facility-administered medications for this visit.        Allergies:  Reviewed, refer to EMR    Relevant Social History:  Social History     Tobacco Use     Smoking status: Former     Packs/day: 0.50     Years: 15.00     Pack years: 7.50     Types: Cigarettes     Start date: 1972     Quit date: 1987     Years since quittin.8     Smokeless tobacco: Never   Substance Use Topics     Alcohol use: Yes     Alcohol/week: 3.0 standard drinks of alcohol     Types: 3 Glasses of wine per week     Comment: Social     Drug use: Never        Physical Exam:  Vitals: BP (!) 157/90 (BP Location: Right arm, Patient Position: Sitting, Cuff Size: Adult Regular)   Pulse 69   Ht 1.651 m (5' 5\")   Wt 61 kg (134 lb 8 oz)   SpO2 99%   BMI 22.38 kg/m    Constitutional: Alert, oriented, pleasant, no acute distress  Head: Normocephalic, atraumatic  Eyes: Extra-ocular movements intact, pupils equally round bilaterally, no scleral icterus  ENT: Oropharynx clear, moist mucus membranes, good dentition  Neck: Supple, no lymphadenopathy  Cardiovascular: Regular rate and rhythm, no murmurs, rubs or gallops, peripheral pulses full/symmetric  Respiratory: Good air movement bilaterally, lungs clear, no wheezes/rales/rhonchi  Musculoskeletal: No edema, normal muscle tone, normal gait  Neurologic: Alert and oriented, cognition appears intact, cranial nerves 2-12 intact, grossly non-focal, bilat flex/ext action tremor of hands L>R  Skin: No rashes/lesions  Psychiatric: normal mentation, affect and mood      Diagnostics:  Labs reviewed in Epic          Assessment and Plan:  Elisha was seen today for medicare visit.    Diagnoses and all orders for this visit:    Medicare Wellness  Need for " pneumococcal 20-valent conjugate vaccination  -     PNEUMOCOCCAL 20 VALENT CONJUGATE (PREVNAR 20)    Major depressive disorder in partial remission, unspecified whether recurrent (H)  Suspect ET. Unfortunately not able to tolerate BB. Advised trial of weaning down on bupropion to see if this helps. If not, could consider araceli/topa/primidone but she would like to defer for now.  -     buPROPion (WELLBUTRIN XL) 150 MG 24 hr tablet; Take 1 tablet (150 mg) by mouth every morning    Chronic pain of left knee  Chronic pain of right knee  Will check Xrays and consider CSIs in next few weeks.  -     XR Knee AP Standing Bilateral; Future    Tinnitus, bilateral  -     Adult Audiology  Referral; Future    Sensory hearing loss, bilateral  -     Adult Audiology  Referral; Future    Screening for hyperlipidemia  Discussed that she may benefit from statin therapy given elevated ASCVD score. Advised repeat lipids.  -     Lipid panel reflex to direct LDL Fasting; Future    Elevated BP  Advised home monitoring, would consider starting therapy next visit pending results    Debi Pizarro MD  Internal Medicine

## 2023-05-01 NOTE — PROGRESS NOTES
Medicare Annual Wellness Questionnaire:  This 68 year old year old female presents for a Medicare Wellness Exam.    Patient Care Team       Relationship Specialty Notifications Start End    Debi Pizarro MD PCP - General Internal Medicine  4/4/19     Phone: [phone] Fax: [phone]         [address] [place] [address]    Debi Pziarro MD Assigned PCP   1/17/21     Phone: [phone] Fax: [phone]         [address] [place] [address]    Elisha Love RN Specialty Care Coordinator Hematology & Oncology Admissions 4/11/22 4/4/24    Phone: [phone] Fax: [phone]        Gardenia Kaplan MD Assigned Cancer Care Provider   3/18/23     Phone: [phone] Fax: [phone]         [address] [place] [address]    Lissy Broussard MD MD Dermatology  4/6/23     Phone: [phone] Pager: [phone] Fax: [phone]         DERMATOLOGY [address] [place], [place] [address] [address]    Leander Oh MD Assigned Surgical Provider   4/29/23     Phone: [phone] Fax: [phone]         [address] [place] [address]          Fall Risk Assessment:  Have you fallen 2 or more times in the last year?  Yes     How many times were you injured due to a fall in the last year?  0    PHQ-2:  Over the last 2 weeks, how often have you been bothered by feeling down, depressed, or hopeless?  Not at all (0)     Over the last 2 weeks, how often have you had little interest or pleasure in doing things?  Not at all (0)     Social History:  What is your marital status?      Who lives in your household?  Wife, Prema House    Does your home have loose rugs in the hallway:     No    Does your home have grab bars in the bathroom:    No    Does your home have handrails on the stairs?  Yes     Does your home have poorly lit areas?    No    Do you feel threatened or controlled by a partner, ex-partner or anyone in your life?   No    Has  anyone hurt you physically, for example by pushing, hitting, slapping or kicking you or forcing you to have sex?   No    Do you need help with the phone, transportation, shopping, preparing meals, housework, laundry, medications or managing money?   No    Sexual Health:  Are you sexually active?    Yes     If yes, with men, women, or both?  Women     If yes, how many partners?  1    If yes, are you using condoms?    N/A    Have you had any sexually transmitted infections in the last year?   No    Do you have any sexual concerns?    No    Women Only:  Women: What year did you stop having periods (approximate age)?  1987    Women: Any vaginal bleeding in the last year?    No    Women: Have you ever had an abnormal Pap smear?    No    General Health Assessment:  Have you noticed any hearing difficulties?   Yes     Do you wear hearing aids?   No    Have you seen a hearing professional such as an audiologist in the last 1 year?   No    Do you have vision difficulty?    No    Do you wear glasses or contacts?   Yes     Have you seen an eye doctor in the last 1 year?   Yes     How many servings of fruits and vegetables do you eat a day?  Fruit: 2  Vegetables: 2    How often do you exercise in a week?  4-5    How long and what kind of exercise do you do?  Walking (brisk) 45 min,Weight resistance at gym 25 min    Tobacco and Alcohol History:  Do you use tobacco/nicotine products?    No    If yes, please list the method of use and average weekly consumption?  N/A    Do you use any other drugs?   No         Do you drink alcohol?   Yes     If you drink alcohol, how many drinks per week?  3-4    Advance Directive:  Have you completed an Advance Directives document?  Yes     If yes, have you given a copy to the clinic?   No    Do you need information on Advance Directives?   No    Dario Espino, PRECIOUS at 8:46 AM on 5/1/2023

## 2023-05-15 ENCOUNTER — OFFICE VISIT (OUTPATIENT)
Dept: INTERNAL MEDICINE | Facility: CLINIC | Age: 68
End: 2023-05-15
Payer: MEDICARE

## 2023-05-15 ENCOUNTER — LAB (OUTPATIENT)
Dept: LAB | Facility: CLINIC | Age: 68
End: 2023-05-15
Payer: MEDICARE

## 2023-05-15 VITALS
HEART RATE: 68 BPM | WEIGHT: 133.8 LBS | SYSTOLIC BLOOD PRESSURE: 126 MMHG | BODY MASS INDEX: 22.29 KG/M2 | TEMPERATURE: 98.1 F | DIASTOLIC BLOOD PRESSURE: 81 MMHG | OXYGEN SATURATION: 100 % | HEIGHT: 65 IN

## 2023-05-15 DIAGNOSIS — M17.10 ARTHRITIS OF KNEE: Primary | ICD-10-CM

## 2023-05-15 DIAGNOSIS — Z13.220 SCREENING FOR HYPERLIPIDEMIA: ICD-10-CM

## 2023-05-15 DIAGNOSIS — E78.5 HYPERLIPIDEMIA LDL GOAL <100: ICD-10-CM

## 2023-05-15 LAB
CHOLEST SERPL-MCNC: 212 MG/DL
HDLC SERPL-MCNC: 67 MG/DL
LDLC SERPL CALC-MCNC: 129 MG/DL
NONHDLC SERPL-MCNC: 145 MG/DL
TRIGL SERPL-MCNC: 81 MG/DL

## 2023-05-15 PROCEDURE — 20610 DRAIN/INJ JOINT/BURSA W/O US: CPT | Mod: 50 | Performed by: INTERNAL MEDICINE

## 2023-05-15 PROCEDURE — 36415 COLL VENOUS BLD VENIPUNCTURE: CPT | Performed by: PATHOLOGY

## 2023-05-15 PROCEDURE — 99213 OFFICE O/P EST LOW 20 MIN: CPT | Mod: 25 | Performed by: INTERNAL MEDICINE

## 2023-05-15 PROCEDURE — 80061 LIPID PANEL: CPT | Performed by: PATHOLOGY

## 2023-05-15 RX ORDER — TRIAMCINOLONE ACETONIDE 40 MG/ML
40 INJECTION, SUSPENSION INTRA-ARTICULAR; INTRAMUSCULAR ONCE
Status: COMPLETED | OUTPATIENT
Start: 2023-05-15 | End: 2023-05-15

## 2023-05-15 RX ORDER — SIMVASTATIN 20 MG
20 TABLET ORAL AT BEDTIME
Qty: 90 TABLET | Refills: 3 | Status: SHIPPED | OUTPATIENT
Start: 2023-05-15 | End: 2024-06-03

## 2023-05-15 RX ADMIN — TRIAMCINOLONE ACETONIDE 40 MG: 40 INJECTION, SUSPENSION INTRA-ARTICULAR; INTRAMUSCULAR at 18:31

## 2023-05-15 RX ADMIN — TRIAMCINOLONE ACETONIDE 40 MG: 40 INJECTION, SUSPENSION INTRA-ARTICULAR; INTRAMUSCULAR at 18:28

## 2023-05-15 NOTE — NURSING NOTE
"Elisha Gao is a 68 year old female patient that presents today in clinic for the following:    Chief Complaint   Patient presents with     RECHECK     Follow up.     Imm/Inj     Knee injection.     The patient's allergies and medications were reviewed as noted. A set of vitals were recorded as noted without incident: /81 (BP Location: Right arm, Patient Position: Sitting, Cuff Size: Adult Regular)   Pulse 68   Temp 98.1  F (36.7  C) (Oral)   Ht 1.651 m (5' 5\")   Wt 60.7 kg (133 lb 12.8 oz)   SpO2 100%   BMI 22.27 kg/m  . The patient does not have any other questions for the provider.    Timothy James, EMT at 10:46 AM on 5/15/2023.  Primary care clinic: [phone]  "

## 2023-05-15 NOTE — PROGRESS NOTES
History of Present Illness:  Ms. Gao is a 68 year old female who presents for  Chief Complaint   Patient presents with     RECHECK     Follow up.     Imm/Inj     Knee injection.       3/22 diagnosed with R breast cancer, stage T1b, ER/[place]+ Her neg, s/p lumpectomy, XRT and anastrozole, and zometa    She reduced wellbutrin helping with tremors, wants to stay on 150 mg dose  BP at home is well controlled, 120-130/70-80  Takes aleve for knees, had remote injection, no problems, interested in doing both    Impression:  1. No acute osseous abnormality.  2. Mild joint space narrowing of the bilateral medial compartments  with osteophytic spurring.  3. Compared to tib-fib radiographs 5/25/2021, increased size of  radiopaque density projecting adjacent to the left medial joint space  which may represent joint body versus mineralization of the medial  collateral ligament, though localization is difficult given lack of  orthogonal views.    Recent Labs   Lab Test 05/15/23  1014 03/02/22  0842   CHOL 212* 222*   HDL 67 72   * 129*   TRIG 81 103       The 10-year ASCVD risk score (Fran SÁNCHEZ, et al., 2019) is: 8.1%    Values used to calculate the score:      Age: 68 years      Sex: Female      Is Non- : No      Diabetic: No      Tobacco smoker: No      Systolic Blood Pressure: 126 mmHg      Is BP treated: No      HDL Cholesterol: 67 mg/dL      Total Cholesterol: 212 mg/dL                          A detailed Review of Systems was performed, verified and is negative except as documented in the HPI.  All health questionnaires were reviewed, verified and relevant information documented above.      Past Medical History:  Past Medical History:   Diagnosis Date     Breast cancer (H) 03/2022     Chronic low back pain     for a couple years, worse at night, not evaluated     Depression      Essential tremor      Invasive ductal carcinoma of breast, female, right (H)      osteopenia     FRAX  11/2%   2022  "      Active Meds:  Current Outpatient Medications   Medication     anastrozole (ARIMIDEX) 1 MG tablet     buPROPion (WELLBUTRIN XL) 150 MG 24 hr tablet     citalopram (CELEXA) 40 MG tablet     magnesium 250 MG tablet     Vitamin D, Cholecalciferol, 25 MCG (1000 UT) TABS     No current facility-administered medications for this visit.        Allergies:  Reviewed, refer to EMR    Relevant Social History:  Social History     Tobacco Use     Smoking status: Former     Packs/day: 0.50     Years: 15.00     Pack years: 7.50     Types: Cigarettes     Start date: 1972     Quit date: 1987     Years since quittin.9     Smokeless tobacco: Never   Substance Use Topics     Alcohol use: Yes     Alcohol/week: 3.0 standard drinks of alcohol     Types: 3 Glasses of wine per week     Comment: Social     Drug use: Never        Physical Exam:  Vitals: /81 (BP Location: Right arm, Patient Position: Sitting, Cuff Size: Adult Regular)   Pulse 68   Temp 98.1  F (36.7  C) (Oral)   Ht 1.651 m (5' 5\")   Wt 60.7 kg (133 lb 12.8 oz)   SpO2 100%   BMI 22.27 kg/m    Constitutional: Alert, oriented, pleasant, no acute distress  Head: Normocephalic, atraumatic  Eyes: Extra-ocular movements intact, pupils equally round bilaterally, no scleral icterus  Musculoskeletal: No edema, normal muscle tone, normal gait  Neurologic: Alert and oriented, cranial nerves 2-12 intact, grossly non-focal  Skin: No rashes/lesions  Psychiatric: normal mentation, affect and mood      Diagnostics:  Labs reviewed in Epic        PREOPERATIVE DIAGNOSIS: Osteoarthritis   POSTOPERATIVE DIAGNOSIS: Osteoarthritis   PROCEDURE: The patient was apprised of the risks and the benefits of the procedure and consented. The patient s bilateral knees were sterilely prepped with chloraprep. A 40 mg of kenalog was drawn up into a 5 mL syringe with a 4 mL of 1% lidocaine. The patient was injected with a 1.5-inch 25-gauze needle at the lateral aspect of her " bilateral flexed knees. There were no complications. The patient tolerated the procedure well. There was minimal bleeding. The patient was instructed to ice his knee upon leaving clinic and refrain from overuse over the next 3 days. The patient was instructed to go to the emergency room with any usual pain, swelling, or redness occurred in the injected area. The patient was given a followup appointment to evaluate response to the injection to his increased range of motion and reduction of pain.      Assessment and Plan:  Elisha was seen today for recheck and imm/inj.    Diagnoses and all orders for this visit:    Arthritis of knee  -     DRAIN/INJECT LARGE JOINT/BURSA  -     DRAIN/INJECT LARGE JOINT/BURSA  -     lidocaine 1 % 4 mL  -     triamcinolone (KENALOG-40) injection 40 mg  -     triamcinolone (KENALOG-40) injection 40 mg  -     lidocaine 1 % 4 mL    Hyperlipidemia LDL goal <100  Patient amenable to starting statin given elevated ASCVD risk. Discussed r/b/a to medications.  -     simvastatin (ZOCOR) 20 MG tablet; Take 1 tablet (20 mg) by mouth At Bedtime      Tremor  Improved on lower dose of wellbutrin, she wishes to continue low dose versus discontinue      Debi Pizarro MD  Internal Medicine

## 2023-06-12 ENCOUNTER — ONCOLOGY VISIT (OUTPATIENT)
Dept: ONCOLOGY | Facility: CLINIC | Age: 68
End: 2023-06-12
Attending: PHYSICIAN ASSISTANT
Payer: MEDICARE

## 2023-06-12 VITALS
BODY MASS INDEX: 21.83 KG/M2 | OXYGEN SATURATION: 99 % | TEMPERATURE: 98.1 F | RESPIRATION RATE: 16 BRPM | HEART RATE: 65 BPM | DIASTOLIC BLOOD PRESSURE: 92 MMHG | SYSTOLIC BLOOD PRESSURE: 167 MMHG | WEIGHT: 131.2 LBS

## 2023-06-12 DIAGNOSIS — Z17.0 MALIGNANT NEOPLASM OF UPPER-OUTER QUADRANT OF RIGHT BREAST IN FEMALE, ESTROGEN RECEPTOR POSITIVE (H): Primary | ICD-10-CM

## 2023-06-12 DIAGNOSIS — C50.411 MALIGNANT NEOPLASM OF UPPER-OUTER QUADRANT OF RIGHT BREAST IN FEMALE, ESTROGEN RECEPTOR POSITIVE (H): Primary | ICD-10-CM

## 2023-06-12 PROCEDURE — G0463 HOSPITAL OUTPT CLINIC VISIT: HCPCS | Performed by: PHYSICIAN ASSISTANT

## 2023-06-12 PROCEDURE — 99214 OFFICE O/P EST MOD 30 MIN: CPT | Performed by: PHYSICIAN ASSISTANT

## 2023-06-12 RX ORDER — ANASTROZOLE 1 MG/1
1 TABLET ORAL DAILY
Qty: 90 TABLET | Refills: 3 | Status: SHIPPED | OUTPATIENT
Start: 2023-06-12 | End: 2024-07-09

## 2023-06-12 ASSESSMENT — PAIN SCALES - GENERAL: PAINLEVEL: NO PAIN (0)

## 2023-06-12 NOTE — LETTER
"    6/12/2023         RE: Elisha Gao  [address] [facility] [address]        Dear Colleague,    Thank you for referring your patient, Elisha Gao, to the [facility]. Please see a copy of my visit note below.    Oncology Visit:  Date on this visit: Jun 12, 2023    Diagnosis: right breast cancer.    Primary Physician: Debi Pizarro     History Of Present Illness:  Ms. Gao is a 68 year old female with right breast cancer.  Routine screening mammogram on 3/17/2022 showed an asymmetry in the upper outer right breast.  Diagnostic breast imaging confirmed a 4 mm spiculated mass at 11:00, 4 cm from the nipple of the right breast.  Biopsy was consistent with a grade 2 invasive ductal carcinoma, ER strong in 98%, WA moderate in 40%, and HER2 negative by FISH and atypical ductal hyperplasia.  Patient underwent right breast lumpectomy and right axillary sentinel lymph node biopsy under the care of Dr. Garcia on 4/11/2022.  Pathology showed a grade 2 invasive ductal carcinoma measuring 9 mm with associated low grade DCIS.  Surgical margins were negative.  There was no lymphovascular invasion and two sentinel lymph nodes were benign.  She completed radiation, 2600 cGy in 5 fractions, to the right breast on 5/25/2022.  She has been on anastrozole since 6/15/2022.    Interval History: Elisha is feeling great. She feels a lot better now compared to the winter. Energy level is better. She is feeling more like herself. Tolerating anastrozole well apart from being more \"sweaty\" and some mild muscle aches intermittently. Knee pain is better after having cortisone injections.     She has been active with biking, walking, and horseback riding. No recent fevers or infectious symptoms. No new or different focal bone or joint pain. No lumps/bumps. No cough, SOB, abdominal pain, or changes in bowels. Appetite is great.     She has no concerns today.       Past Medical/Surgical History:  Past " Medical History:   Diagnosis Date    Breast cancer (H) 03/2022    Chronic low back pain     for a couple years, worse at night, not evaluated    Depression     Essential tremor     Invasive ductal carcinoma of breast, female, right (H)     osteopenia     FRAX  11/2%   2022   Colonoscopy in 2012 w/o polyps, next due in 2022.    Past Surgical History:   Procedure Laterality Date    HYSTERECTOMY  1987    endometriosis, ovarian cysts, hormones x 10  years    LASIK Bilateral     LUMPECTOMY, BREAST, LOCALIZED USING RADIOFREQUENCY IDENTIFICATION Right 4/11/2022    Procedure: LUMPECTOMY, BREAST, LOCALIZED USING RADIOFREQUENCY IDENTIFICATION, sentinel lymph node biopsy;  Surgeon: Santy Garcia MD;  Location: [facility] OR     Allergies:  Allergies as of 06/12/2023 - Reviewed 06/12/2023   Allergen Reaction Noted    Alendronate Nausea and Vomiting 05/26/2010    Raloxifene Nausea 05/26/2010    Risedronate Nausea and Vomiting 05/26/2010     Current Medications:  Current Outpatient Medications   Medication Sig Dispense Refill    anastrozole (ARIMIDEX) 1 MG tablet Take 1 tablet (1 mg) by mouth daily 90 tablet 3    buPROPion (WELLBUTRIN XL) 150 MG 24 hr tablet Take 1 tablet (150 mg) by mouth every morning 90 tablet 1    citalopram (CELEXA) 40 MG tablet Take 1 tablet (40 mg) by mouth daily 90 tablet 0    magnesium 250 MG tablet Take 1 tablet by mouth every morning       simvastatin (ZOCOR) 20 MG tablet Take 1 tablet (20 mg) by mouth At Bedtime 90 tablet 3    Vitamin D, Cholecalciferol, 25 MCG (1000 UT) TABS Take 3 tablets by mouth every morning         Family and Social History:  See initial consultation dated 4/4/2022 for further details.  Breast Actionable panel on 4/4/2022 was negative for germline genetic mutation.    Physical Exam:  BP (!) 167/92 (BP Location: Left arm, Patient Position: Sitting, Cuff Size: Adult Regular)   Pulse 65   Temp 98.1  F (36.7  C) (Oral)   Resp 16   Wt 59.5 kg (131 lb 3.2 oz)   SpO2 99%   BMI 21.83  kg/m    General:  Well appearing, well-nourished adult female in NAD.  Alert and oriented x 3.  HEENT:  Normocephalic.  Sclera anicteric.  MMM.    Lymph:  No palpable cervical, supraclavicular, or axillary LAD.  Chest:  CTA bilaterally.  No wheezes or crackles.  CV:  RRR.  Nl S1 and S2.  No m/r/g.  Breast:  Right axillary and right upper outer breast incision are well healed. There is some fibrosis along lumpectomy scar. There are no discretely palpable masses in either breast.  Bilateral nipples are everted.   Abd:  Soft/ND/NT +BS   Ext:  No edema of the bilateral lower extremities.  Palpable distal pulses in both feet, good capillary refill (CR time <3s).   Neuro:  Cranial nerves grossly intact.  Gait stable.  Psych:  Mood and affect appear normal.  Skin:  No visible concerning skin rashes or lesions.    Laboratory/Imaging Studies  Nothing new     ASSESSMENT/PLAN:  69 yo female with stage Ia, V2wT9H2, grade 2, ER/KY positive, HER-2 negative invasive ductal carcinoma of the right breast.   She is s/p right breast lumpectomy and right axillary sentinel lymph node procedure.    1.  Right breast cancer:  Ms. Gao is about 14 months out from excision of a right breast cancer.  She is on treatment with anastrozole and tolerating well with manageable side effects.  Plan is to treat with a total 5 years of endocrine therapy.    There are no concerning signs or symptoms of recurrence today. She will follow-up in 3 months with Dr. Kaplan. Next screening mammograms will be due around 12/15/2023.    2.  Osteopenia:  DEXA bone density scan 3/17/2022 with a lowest T-score of -2.0 and is consistent with osteopenia.  Zometa 4 mg IV once every 6 months for 3 years, to prevent bone loss on aromatase inhibitor therapy and also reduce the risk of breast cancer bone metastasis was started on 9/14/2022.  No issues with starting zometa. Next due in September.     Caprice Hsu PA-C

## 2023-06-12 NOTE — NURSING NOTE
"Oncology Rooming Note    June 12, 2023 2:28 PM   Elisha Gao is a 68 year old female who presents for:    Chief Complaint   Patient presents with     Oncology Clinic Visit     Invasive Ductal Carcinoma of the Right Breast     Initial Vitals: BP (!) 167/92 (BP Location: Left arm, Patient Position: Sitting, Cuff Size: Adult Regular)   Pulse 65   Temp 98.1  F (36.7  C) (Oral)   Resp 16   Wt 59.5 kg (131 lb 3.2 oz)   SpO2 99%   BMI 21.83 kg/m   Estimated body mass index is 21.83 kg/m  as calculated from the following:    Height as of 5/15/23: 1.651 m (5' 5\").    Weight as of this encounter: 59.5 kg (131 lb 3.2 oz). Body surface area is 1.65 meters squared.  No Pain (0) Comment: Data Unavailable   No LMP recorded. Patient is postmenopausal.  Allergies reviewed: Yes  Medications reviewed: Yes    Medications: Medication refills not needed today.  Pharmacy name entered into [facility]:    [facility] [address] [place] [address] - [address] [place] [address] PHARMACY [place], [place] - [address] [place] [address] [phone]  [place], [place] - [address] [place] [address] [address]  [place], [place] - [address]     Clinical concerns: Pt presents today for f/uRadha Barker LPN  6/12/2023              "
23-Dec-2019

## 2023-06-27 ENCOUNTER — OFFICE VISIT (OUTPATIENT)
Dept: OTOLARYNGOLOGY | Facility: CLINIC | Age: 68
End: 2023-06-27
Attending: INTERNAL MEDICINE
Payer: MEDICARE

## 2023-06-27 ENCOUNTER — PRE VISIT (OUTPATIENT)
Dept: OTOLARYNGOLOGY | Facility: CLINIC | Age: 68
End: 2023-06-27

## 2023-06-27 ENCOUNTER — OFFICE VISIT (OUTPATIENT)
Dept: AUDIOLOGY | Facility: CLINIC | Age: 68
End: 2023-06-27
Attending: INTERNAL MEDICINE
Payer: MEDICARE

## 2023-06-27 VITALS
HEART RATE: 63 BPM | HEIGHT: 65 IN | DIASTOLIC BLOOD PRESSURE: 83 MMHG | BODY MASS INDEX: 21.66 KG/M2 | SYSTOLIC BLOOD PRESSURE: 142 MMHG | OXYGEN SATURATION: 97 % | WEIGHT: 130 LBS

## 2023-06-27 DIAGNOSIS — H90.3 SENSORY HEARING LOSS, BILATERAL: ICD-10-CM

## 2023-06-27 DIAGNOSIS — H90.3 SNHL (SENSORY-NEURAL HEARING LOSS), ASYMMETRICAL: Primary | ICD-10-CM

## 2023-06-27 DIAGNOSIS — H90.3 ASYMMETRICAL SENSORINEURAL HEARING LOSS: Primary | ICD-10-CM

## 2023-06-27 DIAGNOSIS — H93.13 TINNITUS, BILATERAL: ICD-10-CM

## 2023-06-27 PROCEDURE — 92550 TYMPANOMETRY & REFLEX THRESH: CPT | Performed by: AUDIOLOGIST

## 2023-06-27 PROCEDURE — 92557 COMPREHENSIVE HEARING TEST: CPT | Performed by: AUDIOLOGIST

## 2023-06-27 PROCEDURE — 99213 OFFICE O/P EST LOW 20 MIN: CPT | Performed by: REGISTERED NURSE

## 2023-06-27 ASSESSMENT — PAIN SCALES - GENERAL: PAINLEVEL: NO PAIN (0)

## 2023-06-27 NOTE — PATIENT INSTRUCTIONS
- You were seen in the ENT Clinic today by Little Souza NP.   - Schedule MRI at your convenience.  - Follow up in 3 months for repeat audiogram and follow up appointment.   - Contact clinic for urgent appointment with any new hearing loss.  - Please send a [employer] message or call the ENT clinic at [phone] with any questions or concerns.    Recommend limiting the amount of sodium in your diet  to 4598-1879  mg per day. Fluctuating levels of sodium are not recommended, as it resets the sodium pump.   An even level of sodium is best. Less is not more- do not try for less than 1500 per day.    Some tips for a Low-Sodium Diet:  If you have high blood pressure, heart failure, liver  problems or kidney problems, you may need to  watch your sodium intake. Too much sodium can  cause thirst and shortness of breath. It can also make  your body retain extra fluids.    Sodium is found in many foods.   Most of our sodium comes from  processed  foods like canned  soups, lunch meats and TV dinners.  A major source of sodium is salt, or sodium  chloride. Salt is often used to preserve foods (extend their shelf life). We have gotten quite used to the taste of salt in our foods. When you start to limit your salt intake, you will notice the lack of salt. Give yourself time to adjust to the change.    Tips    - CHECK LABELS: This gives you a good idea of which foods  are high in sodium--and where you can cut back.  -In many recipes, you can often use half the amount of salt  without giving up flavor.  -Choose fresh or frozen instead of canned foods.  - Avoid boxed rice, noodles or potato dishes--these often contain salty seasonings.  - Always double-check serving sizes. If you eat two servings of a food, you will get twice as much sodium.  -Look for products without added salt.   -Ask for condiments on the side, so you can control how much you use.  -Shop along the outer walls of the grocery store.  -Some products (like [employer]  Lite Salt)  contain potassium. If you are taking certain  medicines, these products could raise the level of  potassium in your blood.     For informational purposes only. Not to replace the advice of your health care provider.  Copyright   2005 [employer]. All rights reserved. [employer] [id number] - REV 12/10.

## 2023-06-27 NOTE — LETTER
6/27/2023       RE: Elisha Gao  [address]  [facility] [address]     Dear Colleague,    Thank you for referring your patient, Elisha Gao, to the [facility] [place] [facility]. Please see a copy of my visit note below.      Otolaryngology Clinic  June 27, 2023    Chief Complaint:   Right sudden hearing loss       History of Present Illness:   Elisha Gao is a 68 year old female who presents today for evaluation of sudden right hearing loss. Patient states that they woke up about 3 months ago with right ear pressure and decreased hearing. Symptoms are stable and not worsening. Pressure is typically worse in the morning and tends to go away throughout the day. Hearing does not improve. Patient reports bilateral tinnitus that has been present for many years. Patient denies any otalgia, otorrhea, dizziness, facial numbness/weakness, history of frequent ear infections, or ear surgeries. No symptoms in the left ear. Father with asymmetric SNHL. Mother and brother with bilateral hearing aids.     Past Medical History:  Past Medical History:   Diagnosis Date    Breast cancer (H) 03/2022    Chronic low back pain     for a couple years, worse at night, not evaluated    Depression     Essential tremor     Invasive ductal carcinoma of breast, female, right (H)     osteopenia     FRAX  11/2%   2022       Past Surgical History:  Past Surgical History:   Procedure Laterality Date    HYSTERECTOMY  1987    endometriosis, ovarian cysts, hormones x 10  years    LASIK Bilateral     LUMPECTOMY, BREAST, LOCALIZED USING RADIOFREQUENCY IDENTIFICATION Right 4/11/2022    Procedure: LUMPECTOMY, BREAST, LOCALIZED USING RADIOFREQUENCY IDENTIFICATION, sentinel lymph node biopsy;  Surgeon: Santy Garcia MD;  Location: [facility] OR       Medications:  Current Outpatient Medications   Medication Sig Dispense Refill    anastrozole (ARIMIDEX) 1 MG  "tablet Take 1 tablet (1 mg) by mouth daily 90 tablet 3    buPROPion (WELLBUTRIN XL) 150 MG 24 hr tablet Take 1 tablet (150 mg) by mouth every morning 90 tablet 1    citalopram (CELEXA) 40 MG tablet Take 1 tablet (40 mg) by mouth daily 90 tablet 0    magnesium 250 MG tablet Take 1 tablet by mouth every morning       simvastatin (ZOCOR) 20 MG tablet Take 1 tablet (20 mg) by mouth At Bedtime 90 tablet 3    Vitamin D, Cholecalciferol, 25 MCG (1000 UT) TABS Take 3 tablets by mouth every morning          Allergies:  Allergies   Allergen Reactions    Alendronate Nausea and Vomiting    Raloxifene Nausea    Risedronate Nausea and Vomiting        Social History:  Social History     Tobacco Use    Smoking status: Former     Packs/day: 0.50     Years: 15.00     Pack years: 7.50     Types: Cigarettes     Start date: 1972     Quit date: 1987     Years since quittin.0    Smokeless tobacco: Never   Substance Use Topics    Alcohol use: Yes     Alcohol/week: 3.0 standard drinks of alcohol     Types: 3 Glasses of wine per week     Comment: Social    Drug use: Never       ROS: 10 point ROS neg other than the symptoms noted above in the HPI.    Physical Exam:    BP (!) 142/83 (BP Location: Right arm, Patient Position: Sitting, Cuff Size: Adult Regular)   Pulse 63   Ht 1.651 m (5' 5\")   Wt 59 kg (130 lb)   SpO2 97%   BMI 21.63 kg/m       Constitutional:  The patient was unaccompanied, well-groomed, and in no acute distress.     Neurologic: Alert and oriented x 3.  CN's III-XII within normal limits.  Voice normal.   Psychiatric: The patient's affect was calm, cooperative, and appropriate.     Communication:  Normal; communicates verbally, normal voice quality.   Respiratory: Breathing comfortably without stridor or exertion of accessory muscles.    Ears: Pinnae and tragus non-tender.  EAC's and TM's were clear.       Otologic microscope exam:    Right ear was examined under the microscope.  Normal appearing TM, nicely " aerated middle ear space.    Left ear was also examined under the microscope.  Normal appearing TM, nicely aerated middle ear space.        Audiogram: 6/27/2023- data independently reviewed  Right ear: Mild sloping to moderate sensorineural hearing loss (10-15 dB asymmetry with right worse than left from 250-1000 Hz)   Left ear: Normal sloping to moderate sensorineural hearing loss   WR right: 92% at 75 dB left: 88% at 65 dB  Acoustic Reflexes: Present in all conditions  Tympanograms: type A bilaterally     Assessment and Plan:  1. Asymmetrical sensorineural hearing loss  Patient with new onset of right ear plugging, pressure and decreased hearing. Onset about 3 months ago. Audiogram today does show some asymmetric low frequency hearing loss on the right side. Word recognition score is symmetric. Ear exam is normal without effusion or retraction.     Recommend a MRI to further evaluate this asymmetric sensorineural hearing loss. Discussed possibility of hydrops, although patient does not have fluctuation of hearing. Recommend a low salt diet.     Patient should follow up in 3 months for a repeat audiogram. Sooner for any new changes in hearing.    Little Souza DNP, APRN, CNP  Otolaryngology  Head & Neck Surgery  [phone]    30 minutes spent by me on the date of the encounter doing chart review, history and exam, documentation and further activities per the note        Again, thank you for allowing me to participate in the care of your patient.      Sincerely,    Esther Souza, NP

## 2023-06-27 NOTE — PROGRESS NOTES
Otolaryngology Clinic  June 27, 2023    Chief Complaint:   Right sudden hearing loss       History of Present Illness:   Elisha Gao is a 68 year old female who presents today for evaluation of sudden right hearing loss. Patient states that they woke up about 3 months ago with right ear pressure and decreased hearing. Symptoms are stable and not worsening. Pressure is typically worse in the morning and tends to go away throughout the day. Hearing does not improve. Patient reports bilateral tinnitus that has been present for many years. Patient denies any otalgia, otorrhea, dizziness, facial numbness/weakness, history of frequent ear infections, or ear surgeries. No symptoms in the left ear. Father with asymmetric SNHL. Mother and brother with bilateral hearing aids.     Past Medical History:  Past Medical History:   Diagnosis Date     Breast cancer (H) 03/2022     Chronic low back pain     for a couple years, worse at night, not evaluated     Depression      Essential tremor      Invasive ductal carcinoma of breast, female, right (H)      osteopenia     FRAX  11/2%   2022       Past Surgical History:  Past Surgical History:   Procedure Laterality Date     HYSTERECTOMY  1987    endometriosis, ovarian cysts, hormones x 10  years     LASIK Bilateral      LUMPECTOMY, BREAST, LOCALIZED USING RADIOFREQUENCY IDENTIFICATION Right 4/11/2022    Procedure: LUMPECTOMY, BREAST, LOCALIZED USING RADIOFREQUENCY IDENTIFICATION, sentinel lymph node biopsy;  Surgeon: Santy Garcia MD;  Location: [facility] OR       Medications:  Current Outpatient Medications   Medication Sig Dispense Refill     anastrozole (ARIMIDEX) 1 MG tablet Take 1 tablet (1 mg) by mouth daily 90 tablet 3     buPROPion (WELLBUTRIN XL) 150 MG 24 hr tablet Take 1 tablet (150 mg) by mouth every morning 90 tablet 1     citalopram (CELEXA) 40 MG tablet Take 1 tablet (40 mg) by mouth daily 90 tablet 0     magnesium 250 MG tablet Take 1 tablet by mouth every morning     "    simvastatin (ZOCOR) 20 MG tablet Take 1 tablet (20 mg) by mouth At Bedtime 90 tablet 3     Vitamin D, Cholecalciferol, 25 MCG (1000 UT) TABS Take 3 tablets by mouth every morning          Allergies:  Allergies   Allergen Reactions     Alendronate Nausea and Vomiting     Raloxifene Nausea     Risedronate Nausea and Vomiting        Social History:  Social History     Tobacco Use     Smoking status: Former     Packs/day: 0.50     Years: 15.00     Pack years: 7.50     Types: Cigarettes     Start date: 1972     Quit date: 1987     Years since quittin.0     Smokeless tobacco: Never   Substance Use Topics     Alcohol use: Yes     Alcohol/week: 3.0 standard drinks of alcohol     Types: 3 Glasses of wine per week     Comment: Social     Drug use: Never       ROS: 10 point ROS neg other than the symptoms noted above in the HPI.    Physical Exam:    BP (!) 142/83 (BP Location: Right arm, Patient Position: Sitting, Cuff Size: Adult Regular)   Pulse 63   Ht 1.651 m (5' 5\")   Wt 59 kg (130 lb)   SpO2 97%   BMI 21.63 kg/m       Constitutional:  The patient was unaccompanied, well-groomed, and in no acute distress.     Neurologic: Alert and oriented x 3.  CN's III-XII within normal limits.  Voice normal.   Psychiatric: The patient's affect was calm, cooperative, and appropriate.     Communication:  Normal; communicates verbally, normal voice quality.   Respiratory: Breathing comfortably without stridor or exertion of accessory muscles.    Ears: Pinnae and tragus non-tender.  EAC's and TM's were clear.       Otologic microscope exam:    Right ear was examined under the microscope.  Normal appearing TM, nicely aerated middle ear space.    Left ear was also examined under the microscope.  Normal appearing TM, nicely aerated middle ear space.        Audiogram: 2023- data independently reviewed  Right ear: Mild sloping to moderate sensorineural hearing loss (10-15 dB asymmetry with right worse than left from " 250-1000 Hz)   Left ear: Normal sloping to moderate sensorineural hearing loss   WR right: 92% at 75 dB left: 88% at 65 dB  Acoustic Reflexes: Present in all conditions  Tympanograms: type A bilaterally     Assessment and Plan:  1. Asymmetrical sensorineural hearing loss  Patient with new onset of right ear plugging, pressure and decreased hearing. Onset about 3 months ago. Audiogram today does show some asymmetric low frequency hearing loss on the right side. Word recognition score is symmetric. Ear exam is normal without effusion or retraction.     Recommend a MRI to further evaluate this asymmetric sensorineural hearing loss. Discussed possibility of hydrops, although patient does not have fluctuation of hearing. Recommend a low salt diet.     Patient should follow up in 3 months for a repeat audiogram. Sooner for any new changes in hearing.    Little Souza DNP, APRN, CNP  Otolaryngology  Head & Neck Surgery  [phone]    30 minutes spent by me on the date of the encounter doing chart review, history and exam, documentation and further activities per the note

## 2023-06-28 NOTE — PROGRESS NOTES
AUDIOLOGY REPORT     SUMMARY: Audiology visit completed. See audiogram for results.       RECOMMENDATIONS: Follow-up with ENT.     Marie Guerrero CCC-A  Licensed Audiologist   [place] #[id number]

## 2023-07-12 ENCOUNTER — MYC REFILL (OUTPATIENT)
Dept: INTERNAL MEDICINE | Facility: CLINIC | Age: 68
End: 2023-07-12
Payer: MEDICARE

## 2023-07-12 DIAGNOSIS — F32.4 MAJOR DEPRESSIVE DISORDER IN PARTIAL REMISSION, UNSPECIFIED WHETHER RECURRENT (H): ICD-10-CM

## 2023-07-13 ENCOUNTER — OFFICE VISIT (OUTPATIENT)
Dept: URGENT CARE | Facility: URGENT CARE | Age: 68
End: 2023-07-13
Payer: MEDICARE

## 2023-07-13 ENCOUNTER — MYC MEDICAL ADVICE (OUTPATIENT)
Dept: INTERNAL MEDICINE | Facility: CLINIC | Age: 68
End: 2023-07-13

## 2023-07-13 VITALS
HEART RATE: 77 BPM | OXYGEN SATURATION: 93 % | DIASTOLIC BLOOD PRESSURE: 89 MMHG | SYSTOLIC BLOOD PRESSURE: 125 MMHG | TEMPERATURE: 97.6 F | RESPIRATION RATE: 18 BRPM

## 2023-07-13 DIAGNOSIS — F32.4 MAJOR DEPRESSIVE DISORDER IN PARTIAL REMISSION, UNSPECIFIED WHETHER RECURRENT (H): ICD-10-CM
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
## 2023-07-13 PROCEDURE — 86618 LYME DISEASE ANTIBODY: CPT | Performed by: FAMILY MEDICINE

## 2023-07-13 PROCEDURE — 36415 COLL VENOUS BLD VENIPUNCTURE: CPT | Performed by: FAMILY MEDICINE

## 2023-07-13 PROCEDURE — 99213 OFFICE O/P EST LOW 20 MIN: CPT | Performed by: FAMILY MEDICINE

## 2023-07-13 RX ORDER — CITALOPRAM HYDROBROMIDE 40 MG/1
40 TABLET ORAL DAILY
Qty: 90 TABLET | Refills: 0 | OUTPATIENT
Start: 2023-07-13

## 2023-07-13 RX ORDER — ONDANSETRON 4 MG/1
4 TABLET, ORALLY DISINTEGRATING ORAL EVERY 8 HOURS PRN
Qty: 6 TABLET | Refills: 1 | Status: SHIPPED | OUTPATIENT
Start: 2023-07-13 | End: 2023-07-14

## 2023-07-13 RX ORDER — CITALOPRAM HYDROBROMIDE 40 MG/1
40 TABLET ORAL DAILY
Qty: 90 TABLET | Refills: 1 | Status: SHIPPED | OUTPATIENT
Start: 2023-07-13 | End: 2024-01-17

## 2023-07-13 RX ORDER — DOXYCYCLINE 100 MG/1
100 CAPSULE ORAL 2 TIMES DAILY
Qty: 28 CAPSULE | Refills: 0 | Status: SHIPPED | OUTPATIENT
Start: 2023-07-13 | End: 2023-07-27

## 2023-07-13 NOTE — PROGRESS NOTES
Assessment & Plan     Tick bite, unspecified site, initial encounter  Start doxy, check lymes  zofran for nausea.  - Lyme Disease Total Abs Bld with Reflex to Confirm CLIA  - doxycycline hyclate (VIBRAMYCIN) 100 MG capsule  Dispense: 28 capsule; Refill: 0  - ondansetron (ZOFRAN ODT) 4 MG ODT tab  Dispense: 6 tablet; Refill: 1             No follow-ups on file.    [facility] Urgent Care Provider  [facility] FER Tello is a 68 year old female who presents to clinic today for the following health issues:  Chief Complaint   Patient presents with     Nausea & Vomiting     Fever, headache, SOB   x 3 days tested negative for covid-19 twice   Recently camping and there were lots of tick      HPI    Fever at night  Headache  Aches  No rash  Last month with tick attached[place]  No ear pain/ST or cough.  Some nausea and vomited this morning.  No CP        Review of Systems        Objective    /89   Pulse 77   Temp 97.6  F (36.4  C) (Tympanic)   Resp 18   SpO2 93%   Physical Exam  Vitals and nursing note reviewed.   Constitutional:       Appearance: Normal appearance.   HENT:      Right Ear: Tympanic membrane normal.      Left Ear: Tympanic membrane normal.      Mouth/Throat:      Mouth: Mucous membranes are moist.   Eyes:      Pupils: Pupils are equal, round, and reactive to light.   Cardiovascular:      Rate and Rhythm: Normal rate and regular rhythm.      Pulses: Normal pulses.      Heart sounds: Normal heart sounds.   Pulmonary:      Effort: Pulmonary effort is normal.      Breath sounds: Normal breath sounds.   Neurological:      Mental Status: She is alert.

## 2023-07-13 NOTE — TELEPHONE ENCOUNTER
Pt was last seen in clinic on 5/15/23. Pt last had citalopram (CELEXA) 40 MG tablet refilled on 2/10/23 for a 90 day supply by PCP. Due for refill. Medication refill sent to pharmacy.     EDGAR KEITH RN on 7/13/2023 at 10:53 AM

## 2023-07-14 LAB — B BURGDOR IGG+IGM SER QL: 0.06

## 2023-07-14 RX ORDER — BUPROPION HYDROCHLORIDE 150 MG/1
150 TABLET ORAL EVERY MORNING
Qty: 90 TABLET | Refills: 1 | Status: SHIPPED | OUTPATIENT
Start: 2023-07-14 | End: 2024-01-17

## 2023-07-14 RX ORDER — ONDANSETRON 4 MG/1
4 TABLET, ORALLY DISINTEGRATING ORAL EVERY 8 HOURS PRN
Qty: 18 TABLET | Refills: 0 | Status: SHIPPED | OUTPATIENT
Start: 2023-07-14

## 2023-07-14 NOTE — TELEPHONE ENCOUNTER
Pt was last seen in clinic on 5/15/23. Pt last had buPROPion (WELLBUTRIN XL) 150 MG 24 hr tablet refilled on 5/1/23 for a 180 day supply by PCP- pt states pharmacy has no record. Refill sent for patient.    Pt last had ondansetron (ZOFRAN ODT) 4 MG ODT tab refilled on 7/13/23 for a 12 tablet supply by  provider. Pt requesting more, routed to provider for refill.     EDGAR KEITH RN on 7/14/2023 at 10:05 AM

## 2023-07-23 ENCOUNTER — ANCILLARY PROCEDURE (OUTPATIENT)
Dept: MRI IMAGING | Facility: CLINIC | Age: 68
End: 2023-07-23
Attending: REGISTERED NURSE
Payer: MEDICARE

## 2023-07-23 DIAGNOSIS — H90.3 ASYMMETRICAL SENSORINEURAL HEARING LOSS: ICD-10-CM

## 2023-07-23 PROCEDURE — G1010 CDSM STANSON: HCPCS | Performed by: STUDENT IN AN ORGANIZED HEALTH CARE EDUCATION/TRAINING PROGRAM

## 2023-07-23 PROCEDURE — 70553 MRI BRAIN STEM W/O & W/DYE: CPT | Mod: MG | Performed by: STUDENT IN AN ORGANIZED HEALTH CARE EDUCATION/TRAINING PROGRAM

## 2023-07-23 PROCEDURE — A9585 GADOBUTROL INJECTION: HCPCS | Mod: JZ | Performed by: STUDENT IN AN ORGANIZED HEALTH CARE EDUCATION/TRAINING PROGRAM

## 2023-07-23 RX ORDER — GADOBUTROL 604.72 MG/ML
7.5 INJECTION INTRAVENOUS ONCE
Status: COMPLETED | OUTPATIENT
Start: 2023-07-23 | End: 2023-07-23

## 2023-07-23 RX ADMIN — GADOBUTROL 5 ML: 604.72 INJECTION INTRAVENOUS at 07:35

## 2023-07-23 NOTE — DISCHARGE INSTRUCTIONS
MRI Contrast Discharge Instructions    The IV contrast you received today will pass out of your body in your  urine. This will happen in the next 24 hours. You will not feel this process.  Your urine will not change color.    Drink at least 4 extra glasses of water or juice today (unless your doctor  has restricted your fluids). This reduces the stress on your kidneys.  You may take your regular medicines.    If you are on dialysis: It is best to have dialysis today.    If you have a reaction: Most reactions happen right away. If you have  any new symptoms after leaving the hospital (such as hives or swelling),  call your hospital at the correct number below. Or call your family doctor.  If you have breathing distress or wheezing, call 911.    Special instructions: ***    I have read and understand the above information.    Signature:______________________________________ Date:___________    Staff:__________________________________________ Date:___________     Time:__________    [facility] Departments:    ___[place]: [phone]  ___[facility]: [phone]  ___[place]: [phone] ___[facility]: [phone]  ___[facility]: [phone]  ___[facility]: [phone]  ___[phone]  ___[facility]: [phone]  ___[phone]

## 2023-09-11 ENCOUNTER — ONCOLOGY VISIT (OUTPATIENT)
Dept: ONCOLOGY | Facility: CLINIC | Age: 68
End: 2023-09-11
Attending: INTERNAL MEDICINE
Payer: MEDICARE

## 2023-09-11 ENCOUNTER — APPOINTMENT (OUTPATIENT)
Dept: LAB | Facility: CLINIC | Age: 68
End: 2023-09-11
Attending: PHYSICIAN ASSISTANT
Payer: MEDICARE

## 2023-09-11 ENCOUNTER — INFUSION THERAPY VISIT (OUTPATIENT)
Dept: ONCOLOGY | Facility: CLINIC | Age: 68
End: 2023-09-11
Attending: PHYSICIAN ASSISTANT
Payer: MEDICARE

## 2023-09-11 VITALS
HEART RATE: 71 BPM | BODY MASS INDEX: 21.77 KG/M2 | DIASTOLIC BLOOD PRESSURE: 84 MMHG | WEIGHT: 130.8 LBS | OXYGEN SATURATION: 100 % | RESPIRATION RATE: 16 BRPM | SYSTOLIC BLOOD PRESSURE: 126 MMHG

## 2023-09-11 DIAGNOSIS — C50.411 MALIGNANT NEOPLASM OF UPPER-OUTER QUADRANT OF RIGHT BREAST IN FEMALE, ESTROGEN RECEPTOR POSITIVE (H): Primary | ICD-10-CM

## 2023-09-11 DIAGNOSIS — Z17.0 MALIGNANT NEOPLASM OF UPPER-OUTER QUADRANT OF RIGHT BREAST IN FEMALE, ESTROGEN RECEPTOR POSITIVE (H): Primary | ICD-10-CM

## 2023-09-11 LAB
ALBUMIN SERPL BCG-MCNC: 4.2 G/DL (ref 3.5–5.2)
CALCIUM SERPL-MCNC: 9.4 MG/DL (ref 8.8–10.2)
CREAT SERPL-MCNC: 0.9 MG/DL (ref 0.51–0.95)
EGFRCR SERPLBLD CKD-EPI 2021: 69 ML/MIN/1.73M2

## 2023-09-11 PROCEDURE — 250N000011 HC RX IP 250 OP 636: Mod: JZ | Performed by: NURSE PRACTITIONER

## 2023-09-11 PROCEDURE — 82040 ASSAY OF SERUM ALBUMIN: CPT | Performed by: NURSE PRACTITIONER

## 2023-09-11 PROCEDURE — 82565 ASSAY OF CREATININE: CPT | Performed by: NURSE PRACTITIONER

## 2023-09-11 PROCEDURE — 258N000003 HC RX IP 258 OP 636: Performed by: NURSE PRACTITIONER

## 2023-09-11 PROCEDURE — G0463 HOSPITAL OUTPT CLINIC VISIT: HCPCS | Mod: 25 | Performed by: NURSE PRACTITIONER

## 2023-09-11 PROCEDURE — 82310 ASSAY OF CALCIUM: CPT | Performed by: NURSE PRACTITIONER

## 2023-09-11 PROCEDURE — 96365 THER/PROPH/DIAG IV INF INIT: CPT

## 2023-09-11 PROCEDURE — 99215 OFFICE O/P EST HI 40 MIN: CPT | Performed by: NURSE PRACTITIONER

## 2023-09-11 PROCEDURE — 36415 COLL VENOUS BLD VENIPUNCTURE: CPT | Performed by: NURSE PRACTITIONER

## 2023-09-11 RX ORDER — ZOLEDRONIC ACID 0.04 MG/ML
4 INJECTION, SOLUTION INTRAVENOUS ONCE
Status: COMPLETED | OUTPATIENT
Start: 2023-09-11 | End: 2023-09-11

## 2023-09-11 RX ORDER — HEPARIN SODIUM (PORCINE) LOCK FLUSH IV SOLN 100 UNIT/ML 100 UNIT/ML
5 SOLUTION INTRAVENOUS
Status: CANCELLED | OUTPATIENT
Start: 2023-09-11

## 2023-09-11 RX ORDER — ZOLEDRONIC ACID 0.04 MG/ML
4 INJECTION, SOLUTION INTRAVENOUS ONCE
Status: CANCELLED | OUTPATIENT
Start: 2023-09-11 | End: 2023-09-11

## 2023-09-11 RX ORDER — HEPARIN SODIUM,PORCINE 10 UNIT/ML
5 VIAL (ML) INTRAVENOUS
Status: CANCELLED | OUTPATIENT
Start: 2023-09-11

## 2023-09-11 RX ADMIN — SODIUM CHLORIDE 250 ML: 9 INJECTION, SOLUTION INTRAVENOUS at 09:13

## 2023-09-11 RX ADMIN — ZOLEDRONIC ACID 4 MG: 0.04 INJECTION, SOLUTION INTRAVENOUS at 09:13

## 2023-09-11 ASSESSMENT — PAIN SCALES - GENERAL: PAINLEVEL: NO PAIN (0)

## 2023-09-11 NOTE — PROGRESS NOTES
Infusion Nursing Note:  Elisha Gao presents today for Cycle 3 Day 1 Zometa.    Patient seen by provider today: Yes: Mary Noyola NP   present during visit today: Not Applicable.    Note: Patient presents to infusion today doing well. No new questions or concerns following her visit with Mary Noyola NP.    Patient confirms she is taking a Calcium and Vitamin D supplement at home as ordered.    Intravenous Access:  Peripheral IV placed.    Treatment Conditions:  Lab Results   Component Value Date     03/02/2022    POTASSIUM 4.3 03/02/2022    CR 0.90 09/11/2023    JOSE 9.4 09/11/2023    BILITOTAL 0.6 03/02/2022    ALBUMIN 4.2 09/11/2023    ALT 18 03/02/2022    AST 14 03/02/2022     Results reviewed, labs MET treatment parameters, ok to proceed with treatment.    Post Infusion Assessment:  Patient tolerated infusion without incident.  Blood return noted pre and post infusion.  Site patent and intact, free from redness, edema or discomfort.  No evidence of extravasations.  Access discontinued per protocol.     Discharge Plan:   Patient declined prescription refills.  Discharge instructions reviewed with: Patient.  Patient and/or family verbalized understanding of discharge instructions and all questions answered.  AVS to patient via [employer].  Patient will return 12/18 for next appointment with Dr. Kaplan.   Patient discharged in stable condition accompanied by: self.  Departure Mode: Ambulatory.      Gisselle Caal RN

## 2023-09-11 NOTE — NURSING NOTE
"Oncology Rooming Note    September 11, 2023 8:25 AM   Elisha Gao is a 68 year old female who presents for:    Chief Complaint   Patient presents with    Labs Only     PIV placed for labs and infusion, vitals checked    Breast Cancer     Malignant neoplasm of upper-outer quadrant of right breast in female, estrogen receptor positive      Initial Vitals: /84   Pulse 71   Resp 16   Wt 59.3 kg (130 lb 12.8 oz)   SpO2 100%   BMI 21.77 kg/m   Estimated body mass index is 21.77 kg/m  as calculated from the following:    Height as of 6/27/23: 1.651 m (5' 5\").    Weight as of this encounter: 59.3 kg (130 lb 12.8 oz). Body surface area is 1.65 meters squared.  No Pain (0) Comment: Data Unavailable   No LMP recorded. Patient is postmenopausal.  Allergies reviewed: Yes  Medications reviewed: Yes    Medications: Medication refills not needed today.  Pharmacy name entered into [facility]:    [facility] [address] [place] [address] - [address] [place] [address] PHARMACY [place], [place] - [address] [facility] [phone]  [place], [place] - [address] [place] [address] [address]IA [address]LEJ[address]A [address]  [place], [place] - [address]    Clinical concerns: none       Pennie Samayoa              "

## 2023-09-11 NOTE — PATIENT INSTRUCTIONS
Contact Numbers  [facility]: [phone] (for symptom and scheduling needs)    Please call the [facility] Triage line if you experience a temperature greater than or equal to 100.4, shaking chills, have uncontrolled nausea, vomiting and/or diarrhea, dizziness, shortness of breath, chest pain, bleeding, unexplained bruising, or if you have any other new/concerning symptoms, questions or concerns.     If you are having any concerning symptoms or wish to speak to a provider before your next infusion visit, please call your care coordinator or triage to notify them so we can adequately serve you.     If you need a refill on a narcotic prescription or other medication, please call triage before your infusion appointment.           September 2023 Sunday Monday Tuesday Wednesday Thursday Friday Saturday                            1     2       3     4     5     6     7     8     9       10     11    LAB PERIPHERAL   8:00 AM   (15 min.)   [facility] LAB DRAW   [facility]    RETURN CCSL   8:15 AM   (45 min.)   Mary Noyloa APRN CNP   [facility]    ONC INFUSION 0.5 HR (30 MIN)   9:00 AM   (30 min.)    ONC INFUSION NURSE   [facility] 12     13     14     15     16       17     18     19     20     21     22     23       24     25     26     27     28     29    NEW   7:30 AM   (15 min.)   Lissy Broussard MD   [facility] [place] 30 October 2023 Sunday Monday Tuesday Wednesday Thursday Friday Saturday   1     2     3     4     5     6     7       8     9     10     11     12     13     14       15     16     17     18     19     20     21       22     23     24     25     26     27     28       29     30     31                                         Lab Results:  Recent Results (from the past 12 hour(s))   Calcium    Collection Time: 09/11/23  8:13 AM   Result Value Ref  Range    Calcium 9.4 8.8 - 10.2 mg/dL   Creatinine    Collection Time: 09/11/23  8:13 AM   Result Value Ref Range    Creatinine 0.90 0.51 - 0.95 mg/dL    GFR Estimate 69 >60 mL/min/1.73m2   Albumin level    Collection Time: 09/11/23  8:13 AM   Result Value Ref Range    Albumin 4.2 3.5 - 5.2 g/dL

## 2023-09-11 NOTE — PROGRESS NOTES
Oncology Visit:  Date on this visit: Sep 11, 2023    Diagnosis: right breast cancer.    Primary Physician: Debi Pizarro     History Of Present Illness:  Ms. Gao is a 68 year old female with right breast cancer.  Routine screening mammogram on 3/17/2022 showed an asymmetry in the upper outer right breast.  Diagnostic breast imaging confirmed a 4 mm spiculated mass at 11:00, 4 cm from the nipple of the right breast.  Biopsy was consistent with a grade 2 invasive ductal carcinoma, ER strong in 98%, NM moderate in 40%, and HER2 negative by FISH and atypical ductal hyperplasia.  Patient underwent right breast lumpectomy and right axillary sentinel lymph node biopsy under the care of Dr. Garcia on 4/11/2022.  Pathology showed a grade 2 invasive ductal carcinoma measuring 9 mm with associated low grade DCIS.  Surgical margins were negative.  There was no lymphovascular invasion and two sentinel lymph nodes were benign.  She completed radiation, 2600 cGy in 5 fractions, to the right breast on 5/25/2022.  She has been on anastrozole since 6/15/2022.    Interval History:   Elisha is seen today unaccompanied and reports she is feeling very well.  She just returned from a camping/hiking trip that she really enjoyed.  Her energy level is good.  She did have a tick bite earlier this summer, and developed high fevers and really felt quite ill.  She was treated with doxycycline, and is now much better.  She has no residual symptoms.  She denies joint pain.  She denies vaginal dryness.  She does have occasional night sweats/hot flashes, although nothing that is bothersome.  She tolerated the Zometa well, and is scheduled for her next infusion today.    She otherwise denies fevers, chills, drenching night sweats, unexplained weight loss, headaches, dizziness or double vision.  She denies mouth sores, nausea, vomiting, abdominal pain, constipation or diarrhea.  She has not noted any unusual bleeding or bruising.  She has no new  focal pain.  She denies any lower extremity pain or swelling.  She denies any rashes or skin issues.  She denies any new lumps, bumps, or breast concerns.    Past Medical/Surgical History:  Past Medical History:   Diagnosis Date    Breast cancer (H) 03/2022    Chronic low back pain     for a couple years, worse at night, not evaluated    Depression     Essential tremor     Invasive ductal carcinoma of breast, female, right (H)     osteopenia     FRAX  11/2%   2022   Colonoscopy in 2012 w/o polyps, next due in 2022.    Past Surgical History:   Procedure Laterality Date    HYSTERECTOMY  1987    endometriosis, ovarian cysts, hormones x 10  years    LASIK Bilateral     LUMPECTOMY, BREAST, LOCALIZED USING RADIOFREQUENCY IDENTIFICATION Right 4/11/2022    Procedure: LUMPECTOMY, BREAST, LOCALIZED USING RADIOFREQUENCY IDENTIFICATION, sentinel lymph node biopsy;  Surgeon: Santy Garcia MD;  Location: [facility] OR     Allergies:  Allergies as of 09/11/2023 - Reviewed 09/11/2023   Allergen Reaction Noted    Alendronate Nausea and Vomiting 05/26/2010    Raloxifene Nausea 05/26/2010    Risedronate Nausea and Vomiting 05/26/2010     Current Medications:  Current Outpatient Medications   Medication Sig Dispense Refill    anastrozole (ARIMIDEX) 1 MG tablet Take 1 tablet (1 mg) by mouth daily 90 tablet 3    buPROPion (WELLBUTRIN XL) 150 MG 24 hr tablet Take 1 tablet (150 mg) by mouth every morning 90 tablet 1    citalopram (CELEXA) 40 MG tablet Take 1 tablet (40 mg) by mouth daily 90 tablet 1    magnesium 250 MG tablet Take 1 tablet by mouth every morning       ondansetron (ZOFRAN ODT) 4 MG ODT tab Take 1 tablet (4 mg) by mouth every 8 hours as needed for nausea 18 tablet 0    simvastatin (ZOCOR) 20 MG tablet Take 1 tablet (20 mg) by mouth At Bedtime 90 tablet 3    Vitamin D, Cholecalciferol, 25 MCG (1000 UT) TABS Take 3 tablets by mouth every morning         Family and Social History:  See initial consultation dated 4/4/2022 for  further details.  Breast Actionable panel on 4/4/2022 was negative for germline genetic mutation.    Physical Exam:  /84   Pulse 71   Resp 16   Wt 59.3 kg (130 lb 12.8 oz)   SpO2 100%   BMI 21.77 kg/m    General:  Well appearing, well-nourished adult female in NAD.  Alert and oriented x 3.  HEENT:  Normocephalic.  Sclera anicteric.  MMM.    Lymph:  No palpable cervical, supraclavicular, or axillary LAD.  Chest:  CTA bilaterally.  No wheezes or crackles.  CV:  RRR.  Nl S1 and S2.  No m/r/g.  Breast:  Right axillary and right upper outer breast incision are well healed. There is some fibrosis along lumpectomy scar. There are no discretely palpable masses in either breast.  Bilateral nipples are everted.   Abd:  Soft/ND/NT +BS .  Ext:  No edema of the bilateral lower extremities.    Neuro:  Cranial nerves grossly intact.  Gait stable.  Psych:  Mood and affect appear normal.  Skin:  No visible concerning skin rashes or lesions.    Laboratory/Imaging Studies  No new imaging to review.      ASSESSMENT/PLAN:  69 yo female with stage Ia, [id number], grade 2, ER/SC positive, HER-2 negative invasive ductal carcinoma of the right breast.   She is s/p right breast lumpectomy and right axillary sentinel lymph node procedure.    Right breast cancer:  Ms. Gao is about 14 months out from excision of a right breast cancer.    -Anastrozole daily, which she is tolerating well overall.    -Per prior notes, plan is to treat with a total 5 years of endocrine therapy.  -No evidence of recurrent disease today per history or exam.  -Screening mammograms due December 2023.    Osteopenia:  -DEXA bone density scan 3/17/2022 with a lowest T-score of -2.0 and is consistent with osteopenia.    -Zometa 4 mg IV once every 6 months for 3 years, to prevent bone loss on aromatase inhibitor therapy and also reduce the risk of breast cancer bone metastasis was started on 9/14/2022 and administered today.  -She has not had any issues with  Zometa.  -No dental concerns.  -Next due March 2024.    Follow-up: As scheduled in December to see Dr. aKplan and for mammogram.  She will call sooner with any concerns.    55 minutes spent on the date of the encounter doing chart review, review of test results, interpretation of tests, patient visit, and documentation.     Mary Noyola, ANTONETTE, CNP  [facility]  [address] [place] [address] [id number] [phone]

## 2023-09-11 NOTE — LETTER
9/11/2023         RE: Elisha Gao  [address] [facility] [address]        Dear Colleague,    Thank you for referring your patient, Elisha Gao, to [facility]. Please see a copy of my visit note below.    Oncology Visit:  Date on this visit: Sep 11, 2023    Diagnosis: right breast cancer.    Primary Physician: Debi Pizarro     History Of Present Illness:  Ms. Gao is a 68 year old female with right breast cancer.  Routine screening mammogram on 3/17/2022 showed an asymmetry in the upper outer right breast.  Diagnostic breast imaging confirmed a 4 mm spiculated mass at 11:00, 4 cm from the nipple of the right breast.  Biopsy was consistent with a grade 2 invasive ductal carcinoma, ER strong in 98%, UT moderate in 40%, and HER2 negative by FISH and atypical ductal hyperplasia.  Patient underwent right breast lumpectomy and right axillary sentinel lymph node biopsy under the care of Dr. Garcia on 4/11/2022.  Pathology showed a grade 2 invasive ductal carcinoma measuring 9 mm with associated low grade DCIS.  Surgical margins were negative.  There was no lymphovascular invasion and two sentinel lymph nodes were benign.  She completed radiation, 2600 cGy in 5 fractions, to the right breast on 5/25/2022.  She has been on anastrozole since 6/15/2022.    Interval History:   Elisha is seen today unaccompanied and reports she is feeling very well.  She just returned from a camping/hiking trip that she really enjoyed.  Her energy level is good.  She did have a tick bite earlier this summer, and developed high fevers and really felt quite ill.  She was treated with doxycycline, and is now much better.  She has no residual symptoms.  She denies joint pain.  She denies vaginal dryness.  She does have occasional night sweats/hot flashes, although nothing that is bothersome.  She tolerated the Zometa well, and is scheduled for her next infusion today.    She otherwise denies  fevers, chills, drenching night sweats, unexplained weight loss, headaches, dizziness or double vision.  She denies mouth sores, nausea, vomiting, abdominal pain, constipation or diarrhea.  She has not noted any unusual bleeding or bruising.  She has no new focal pain.  She denies any lower extremity pain or swelling.  She denies any rashes or skin issues.  She denies any new lumps, bumps, or breast concerns.    Past Medical/Surgical History:  Past Medical History:   Diagnosis Date    Breast cancer (H) 03/2022    Chronic low back pain     for a couple years, worse at night, not evaluated    Depression     Essential tremor     Invasive ductal carcinoma of breast, female, right (H)     osteopenia     FRAX  11/2%   2022   Colonoscopy in 2012 w/o polyps, next due in 2022.    Past Surgical History:   Procedure Laterality Date    HYSTERECTOMY  1987    endometriosis, ovarian cysts, hormones x 10  years    LASIK Bilateral     LUMPECTOMY, BREAST, LOCALIZED USING RADIOFREQUENCY IDENTIFICATION Right 4/11/2022    Procedure: LUMPECTOMY, BREAST, LOCALIZED USING RADIOFREQUENCY IDENTIFICATION, sentinel lymph node biopsy;  Surgeon: Santy Garcia MD;  Location: [facility] OR     Allergies:  Allergies as of 09/11/2023 - Reviewed 09/11/2023   Allergen Reaction Noted    Alendronate Nausea and Vomiting 05/26/2010    Raloxifene Nausea 05/26/2010    Risedronate Nausea and Vomiting 05/26/2010     Current Medications:  Current Outpatient Medications   Medication Sig Dispense Refill    anastrozole (ARIMIDEX) 1 MG tablet Take 1 tablet (1 mg) by mouth daily 90 tablet 3    buPROPion (WELLBUTRIN XL) 150 MG 24 hr tablet Take 1 tablet (150 mg) by mouth every morning 90 tablet 1    citalopram (CELEXA) 40 MG tablet Take 1 tablet (40 mg) by mouth daily 90 tablet 1    magnesium 250 MG tablet Take 1 tablet by mouth every morning       ondansetron (ZOFRAN ODT) 4 MG ODT tab Take 1 tablet (4 mg) by mouth every 8 hours as needed for nausea 18 tablet 0     simvastatin (ZOCOR) 20 MG tablet Take 1 tablet (20 mg) by mouth At Bedtime 90 tablet 3    Vitamin D, Cholecalciferol, 25 MCG (1000 UT) TABS Take 3 tablets by mouth every morning         Family and Social History:  See initial consultation dated 4/4/2022 for further details.  Breast Actionable panel on 4/4/2022 was negative for germline genetic mutation.    Physical Exam:  /84   Pulse 71   Resp 16   Wt 59.3 kg (130 lb 12.8 oz)   SpO2 100%   BMI 21.77 kg/m    General:  Well appearing, well-nourished adult female in NAD.  Alert and oriented x 3.  HEENT:  Normocephalic.  Sclera anicteric.  MMM.    Lymph:  No palpable cervical, supraclavicular, or axillary LAD.  Chest:  CTA bilaterally.  No wheezes or crackles.  CV:  RRR.  Nl S1 and S2.  No m/r/g.  Breast:  Right axillary and right upper outer breast incision are well healed. There is some fibrosis along lumpectomy scar. There are no discretely palpable masses in either breast.  Bilateral nipples are everted.   Abd:  Soft/ND/NT +BS .  Ext:  No edema of the bilateral lower extremities.    Neuro:  Cranial nerves grossly intact.  Gait stable.  Psych:  Mood and affect appear normal.  Skin:  No visible concerning skin rashes or lesions.    Laboratory/Imaging Studies  No new imaging to review.      ASSESSMENT/PLAN:  67 yo female with stage Ia, [id number], grade 2, ER/RI positive, HER-2 negative invasive ductal carcinoma of the right breast.   She is s/p right breast lumpectomy and right axillary sentinel lymph node procedure.    Right breast cancer:  Ms. Gao is about 14 months out from excision of a right breast cancer.    -Anastrozole daily, which she is tolerating well overall.    -Per prior notes, plan is to treat with a total 5 years of endocrine therapy.  -No evidence of recurrent disease today per history or exam.  -Screening mammograms due December 2023.    Osteopenia:  -DEXA bone density scan 3/17/2022 with a lowest T-score of -2.0 and is consistent with  osteopenia.    -Zometa 4 mg IV once every 6 months for 3 years, to prevent bone loss on aromatase inhibitor therapy and also reduce the risk of breast cancer bone metastasis was started on 9/14/2022 and administered today.  -She has not had any issues with Zometa.  -No dental concerns.  -Next due March 2024.    Follow-up: As scheduled in December to see Dr. Kaplan and for mammogram.  She will call sooner with any concerns.    55 minutes spent on the date of the encounter doing chart review, review of test results, interpretation of tests, patient visit, and documentation.     Mary Noyola, ANTONETTE, CNP  [facility]  [address] [place] [address] [phone]

## 2023-09-11 NOTE — NURSING NOTE
Chief Complaint   Patient presents with    Labs Only     PIV placed for labs and infusion, vitals checked     Araseli Mitchell RN on 9/11/2023 at 8:16 AM

## 2023-09-29 ENCOUNTER — OFFICE VISIT (OUTPATIENT)
Dept: DERMATOLOGY | Facility: CLINIC | Age: 68
End: 2023-09-29
Payer: MEDICARE

## 2023-09-29 DIAGNOSIS — L81.4 LENTIGINES: ICD-10-CM

## 2023-09-29 DIAGNOSIS — L82.1 SEBORRHEIC KERATOSES: Primary | ICD-10-CM

## 2023-09-29 DIAGNOSIS — D48.5 NEOPLASM OF UNCERTAIN BEHAVIOR OF SKIN: ICD-10-CM

## 2023-09-29 DIAGNOSIS — D22.9 MULTIPLE BENIGN NEVI: ICD-10-CM

## 2023-09-29 DIAGNOSIS — D18.01 CHERRY ANGIOMA: ICD-10-CM

## 2023-09-29 PROCEDURE — 99203 OFFICE O/P NEW LOW 30 MIN: CPT | Mod: 25 | Performed by: DERMATOLOGY

## 2023-09-29 PROCEDURE — 88342 IMHCHEM/IMCYTCHM 1ST ANTB: CPT | Mod: 26 | Performed by: DERMATOLOGY

## 2023-09-29 PROCEDURE — 88341 IMHCHEM/IMCYTCHM EA ADD ANTB: CPT | Mod: TC | Performed by: DERMATOLOGY

## 2023-09-29 PROCEDURE — 11102 TANGNTL BX SKIN SINGLE LES: CPT | Mod: GC | Performed by: DERMATOLOGY

## 2023-09-29 PROCEDURE — 88341 IMHCHEM/IMCYTCHM EA ADD ANTB: CPT | Mod: 26 | Performed by: DERMATOLOGY

## 2023-09-29 PROCEDURE — 88305 TISSUE EXAM BY PATHOLOGIST: CPT | Mod: 26 | Performed by: DERMATOLOGY

## 2023-09-29 ASSESSMENT — PAIN SCALES - GENERAL: PAINLEVEL: NO PAIN (0)

## 2023-09-29 NOTE — NURSING NOTE
Dermatology Rooming Note    Elisha Gao's goals for this visit include:   Chief Complaint   Patient presents with    Skin Check     Here today for a skin check. Spot of concern on right temple     Nata Shay RN

## 2023-09-29 NOTE — LETTER
9/29/2023       RE: Elisha Gao  [address]  [place] [facility] [place] [place] [address]     Dear Colleague,    Thank you for referring your patient, Elisha Gao, to the [facility] [place] at [facility]. Please see a copy of my visit note below.    [facility] Dermatology Note  Encounter Date: Sep 29, 2023  Date of Last Dermatology Office Visit: Visit date not found   N/A (Patient new to dermatology clinic)    Dermatology Problem List:  LAST FBSE:  9/29/2023   #. NUB; right back, s/p shave bx 9/29/23  ____________________________________________    Assessment & Plan:     #. Neoplasm of uncertain behavior of the skin - right back   Differential at this time includes dysplastic nevus vs pigmented SCC.  - Offered biopsy to the patient for histologic analysis.   - Described risks and benefits of biopsy; patient wishes to proceed.  - Verbal consent obtained shave biopsy performed  - See procedure note below.     # Lesion to monitor:  - R temple, suspect ISK, photo today  - Monitor for changes    # Benign lesions - SKs, cherry angiomas, lentigenes.  - No treatment required    # Multiple benign nevi.   - Monitor for ABCDEs of melanoma   - Continue sun protection - recommend SPF 30 or higher with frequent application   - Return sooner if noticing changing or symptomatic lesions    Procedures Performed:   - Shave biopsy procedure note, location(s): right back. After discussion of benefits and risks including but not limited to bleeding, infection, scar, incomplete removal, recurrence, and non-diagnostic biopsy, written consent and photographs were obtained. The area was cleaned with isopropyl alcohol. 0.5mL of 1% lidocaine with epinephrine was injected to obtain adequate anesthesia of lesion(s). Shave biopsy at site(s) performed. Hemostasis was achieved with aluminium chloride. Petrolatum ointment and a sterile dressing were applied.  The patient tolerated the procedure and no complications were noted. The patient was provided with verbal and written post care instructions.     Follow-up: 1 year(s) in-person, or earlier for new or changing lesions    Staff and Resident:     Staff: Dr. Lissy Carvalho MD  PGY-4, Internal Medicine-Dermatology  Pager: [phone]    Staff Physician Comments:   I saw and evaluated the patient with the resident and I agree with the assessment and plan.  I was present for the key portions of the above major procedure and examination.    Lissy Broussard MD    Department of Dermatology  [facility]: Phone: [phone], Fax: [phone]  10/5/2023     ____________________________________________    CC: Skin Check (Here today for a skin check. Spot of concern on right temple)      HPI:  Ms. Elisha Gao is a(n) 68 year old female who presents today as a new patient for the following chief complaint(s):     Skin Check (Here today for a skin check. Spot of concern on right temple)    Reports a spot at the right temple that seems to fluctuate (grows in volume on and off).  Notable risk factors for skin cancers are as listed below:    -Denies personal or immediate family history of skin cancer.   -Denies occasional (though not frequent) history of peeling burns in childhood/adolescence.   -Reports any smoking or other tobacco use history.    - Quit over 30 years ago; reports about 15 pack year history.   -Denies any history of tanning bed use.    She notes several bug bites over the summer.  She has a horse and enjoys horseback riding.    Patient is otherwise feeling well, without additional skin concerns.    Labs Reviewed:  N/A    Physical Exam:  Vitals: There were no vitals taken for this visit.  General: Well developed adult. Resting comfortably; no acute distress. Conversational and cooperative with  "exam.  Skin Exam: A total skin exam, excluding the genitalia and female breasts (if present), was performed: this included the head/face and neck, upper extremities, chest, back, abdomen, lower extremities, and buttocks.  Patient consent was confirmed before performing sensitive portions of the exam.   - Casey Type III: Sun-occluded skin with slight background pigmentation. Slightly tanned in sun-exposed areas..   - Patient with less than 100 nevi  - At the extremities are several hemorrhagic crusted papules.   -Generalized, but especially appreciable at the upper and lower extremities is a very fine kumar scale and accentuation of skin lines.   -At the central chest there are punctate blue-green circular macules consistent with radiation tattoos.    -At the right temple there is a flat \"stuck on\" appearing papule with erythema at the lower medial aspect.   -At the right back there is a red-brown pink macule with arborizing vessels centrally and pigment appreciable at the periphery.   - No other lesions of concern on areas examined.   Psych: Appropriate mood and affect for situation.    Photos below are in order, right temple (dermatoscope), right back (dermatoscope), and right back.      Medications:  Current Outpatient Medications   Medication    anastrozole (ARIMIDEX) 1 MG tablet    buPROPion (WELLBUTRIN XL) 150 MG 24 hr tablet    citalopram (CELEXA) 40 MG tablet    magnesium 250 MG tablet    ondansetron (ZOFRAN ODT) 4 MG ODT tab    simvastatin (ZOCOR) 20 MG tablet    Vitamin D, Cholecalciferol, 25 MCG (1000 UT) TABS     No current facility-administered medications for this visit.        Past Medical History:   Patient Active Problem List   Diagnosis    Major depressive disorder in partial remission, unspecified whether recurrent (H)    Invasive ductal carcinoma of breast, female, right (H)    Malignant neoplasm of upper-outer quadrant of right breast in female, estrogen receptor positive (H)    Osteopenia " of multiple sites     Past Medical History:   Diagnosis Date    Breast cancer (H) 03/2022    Chronic low back pain     for a couple years, worse at night, not evaluated    Depression     Essential tremor     Invasive ductal carcinoma of breast, female, right (H)     osteopenia     FRAX  11/2%   2022       CC Referred Self, MD  No address on file on close of this encounter.

## 2023-09-29 NOTE — PATIENT INSTRUCTIONS
Wound Care After a Biopsy    What is a skin biopsy?  A skin biopsy allows the doctor to examine a very small piece of tissue under the microscope to determine the diagnosis and the best treatment for the skin condition. A local anesthetic (numbing medicine) is injected with a very small needle into the skin area to be tested. A small piece of skin is taken from the area. Sometimes a suture (stitch) is used.     What are the risks of a skin biopsy?  I will experience scar, bleeding, swelling, pain, crusting and redness. I may experience incomplete removal or recurrence. Risks of this procedure are excessive bleeding, bruising, infection, nerve damage, numbness, thick (hypertrophic or keloidal) scar and non-diagnostic biopsy.    How should I care for my wound for the first 24 hours?  Keep the wound dry and covered for 24 hours  If it bleeds, hold direct pressure on the area for 15 minutes. If bleeding does not stop, call us or go to the emergency room  Avoid strenuous exercise the first 1-2 days or as your doctor instructs you    How should I care for the wound after 24 hours?  After 24 hours, remove the bandage  You may bathe or shower as normal  If you had a scalp biopsy, you can shampoo as usual and can use shower water to clean the biopsy site daily  Clean the wound once a day with gentle soap and water  Do not scrub, be gentle  Apply white petroleum/Vaseline after cleaning the wound with a cotton swab or a clean finger, and keep the site covered with a Bandaid /bandage. Bandages are not necessary with a scalp biopsy  If you are unable to cover the site with a Bandaid /bandage, re-apply ointment 2-3 times a day to keep the site moist. Moisture will help with healing  Avoid strenuous activity for first 1-2 days  Avoid lakes, rivers, pools, and oceans until the stitches are removed or the site is healed    How do I clean my wound?  Wash hands thoroughly with soap or use hand  before all wound care  Clean  the wound with gentle soap and water  Apply white petroleum/Vaseline  to wound after it is clean  Replace the Bandaid /bandage to keep the wound covered for the first few days or as instructed by your doctor  If you had a scalp biopsy, warm shower water to the area on a daily basis should suffice    What should I use to clean my wound?   Cotton-tipped applicators (Qtips )  White petroleum jelly (Vaseline ). Use a clean new container and use Q-tips to apply.  Bandaids  as needed  Gentle soap     How should I care for my wound long term?  Do not get your wound dirty  Keep up with wound care for one week or until the area is healed.  If you have stitches, stitches need to be removed in 14 days. You may return to our clinic for this or you may have it done locally at your doctor s office.  A small scab will form and fall off by itself when the area is completely healed. The area will be red and will become pink in color as it heals. Sun protection is very important for how your scar will turn out. Sunscreen with an SPF 30 or greater is recommended once the area is healed.  You should have some soreness but it should be mild and slowly go away over several days. Talk to your doctor about using tylenol for pain,    When should I call my doctor?  If you have increased:   Pain or swelling  Pus or drainage (clear or slightly yellow drainage is ok)  Temperature over 100F  Spreading redness or warmth around wound    When will I hear about my results?  The biopsy results can take 2 weeks to come back.  Your results will automatically release to [employer] before your provider has even reviewed them.  The clinic will call you with the results, send you a [employer] message, or have you schedule a follow-up clinic or phone time to discuss the results.  Contact our clinics if you do not hear from us in 2 weeks.    Who should I call with questions?  [facility]: [phone]  [facility]  [facility]: [phone]  For urgent needs outside of business hours call the [facility] at [phone] and ask for the dermatology resident on call

## 2023-09-29 NOTE — NURSING NOTE
Lidocaine-epinephrine 1-1:624893 % injection   1.5 mL once for one use, starting 9/29/2023 ending 9/29/2023,  2mL disp, R-0, injection  Injected by Dr. Carvalho.

## 2023-09-29 NOTE — PROGRESS NOTES
[facility] Dermatology Note  Encounter Date: Sep 29, 2023  Date of Last Dermatology Office Visit: Visit date not found   N/A (Patient new to dermatology clinic)    Dermatology Problem List:  LAST FBSE:  9/29/2023   #. NUB; right back, s/p shave bx 9/29/23  ____________________________________________    Assessment & Plan:     #. Neoplasm of uncertain behavior of the skin - right back   Differential at this time includes dysplastic nevus vs pigmented SCC.  - Offered biopsy to the patient for histologic analysis.   - Described risks and benefits of biopsy; patient wishes to proceed.  - Verbal consent obtained shave biopsy performed  - See procedure note below.     # Lesion to monitor:  - R temple, suspect ISK, photo today  - Monitor for changes    # Benign lesions - SKs, cherry angiomas, lentigenes.  - No treatment required    # Multiple benign nevi.   - Monitor for ABCDEs of melanoma   - Continue sun protection - recommend SPF 30 or higher with frequent application   - Return sooner if noticing changing or symptomatic lesions    Procedures Performed:   - Shave biopsy procedure note, location(s): right back. After discussion of benefits and risks including but not limited to bleeding, infection, scar, incomplete removal, recurrence, and non-diagnostic biopsy, written consent and photographs were obtained. The area was cleaned with isopropyl alcohol. 0.5mL of 1% lidocaine with epinephrine was injected to obtain adequate anesthesia of lesion(s). Shave biopsy at site(s) performed. Hemostasis was achieved with aluminium chloride. Petrolatum ointment and a sterile dressing were applied. The patient tolerated the procedure and no complications were noted. The patient was provided with verbal and written post care instructions.     Follow-up: 1 year(s) in-person, or earlier for new or changing lesions    Staff and Resident:     Staff: Dr. Lissy Carvalho MD  PGY-4, Internal  Medicine-Dermatology  Pager: [phone]    Staff Physician Comments:   I saw and evaluated the patient with the resident and I agree with the assessment and plan.  I was present for the key portions of the above major procedure and examination.    Lissy Broussard MD    Department of Dermatology  [facility]: Phone: [phone], Fax: [phone]  10/5/2023     ____________________________________________    CC: Skin Check (Here today for a skin check. Spot of concern on right temple)      HPI:  Ms. Elisha Gao is a(n) 68 year old female who presents today as a new patient for the following chief complaint(s):     Skin Check (Here today for a skin check. Spot of concern on right temple)    Reports a spot at the right temple that seems to fluctuate (grows in volume on and off).  Notable risk factors for skin cancers are as listed below:    -Denies personal or immediate family history of skin cancer.   -Denies occasional (though not frequent) history of peeling burns in childhood/adolescence.   -Reports any smoking or other tobacco use history.    - Quit over 30 years ago; reports about 15 pack year history.   -Denies any history of tanning bed use.    She notes several bug bites over the summer.  She has a horse and enjoys horseback riding.    Patient is otherwise feeling well, without additional skin concerns.    Labs Reviewed:  N/A    Physical Exam:  Vitals: There were no vitals taken for this visit.  General: Well developed adult. Resting comfortably; no acute distress. Conversational and cooperative with exam.  Skin Exam: A total skin exam, excluding the genitalia and female breasts (if present), was performed: this included the head/face and neck, upper extremities, chest, back, abdomen, lower extremities, and buttocks.  Patient consent was confirmed before performing sensitive portions of the exam.   - Casey Type  "III: Sun-occluded skin with slight background pigmentation. Slightly tanned in sun-exposed areas..   - Patient with less than 100 nevi  - At the extremities are several hemorrhagic crusted papules.   -Generalized, but especially appreciable at the upper and lower extremities is a very fine kumar scale and accentuation of skin lines.   -At the central chest there are punctate blue-green circular macules consistent with radiation tattoos.    -At the right temple there is a flat \"stuck on\" appearing papule with erythema at the lower medial aspect.   -At the right back there is a red-brown pink macule with arborizing vessels centrally and pigment appreciable at the periphery.   - No other lesions of concern on areas examined.   Psych: Appropriate mood and affect for situation.    Photos below are in order, right temple (dermatoscope), right back (dermatoscope), and right back.      Medications:  Current Outpatient Medications   Medication    anastrozole (ARIMIDEX) 1 MG tablet    buPROPion (WELLBUTRIN XL) 150 MG 24 hr tablet    citalopram (CELEXA) 40 MG tablet    magnesium 250 MG tablet    ondansetron (ZOFRAN ODT) 4 MG ODT tab    simvastatin (ZOCOR) 20 MG tablet    Vitamin D, Cholecalciferol, 25 MCG (1000 UT) TABS     No current facility-administered medications for this visit.        Past Medical History:   Patient Active Problem List   Diagnosis    Major depressive disorder in partial remission, unspecified whether recurrent (H)    Invasive ductal carcinoma of breast, female, right (H)    Malignant neoplasm of upper-outer quadrant of right breast in female, estrogen receptor positive (H)    Osteopenia of multiple sites     Past Medical History:   Diagnosis Date    Breast cancer (H) 03/2022    Chronic low back pain     for a couple years, worse at night, not evaluated    Depression     Essential tremor     Invasive ductal carcinoma of breast, female, right (H)     osteopenia     FRAX  11/2%   2022       CC Referred " Self, MD  No address on file on close of this encounter.

## 2023-10-04 LAB
PATH REPORT.COMMENTS IMP SPEC: NORMAL
PATH REPORT.FINAL DX SPEC: NORMAL
PATH REPORT.GROSS SPEC: NORMAL
PATH REPORT.MICROSCOPIC SPEC OTHER STN: NORMAL
PATH REPORT.RELEVANT HX SPEC: NORMAL

## 2023-10-06 ENCOUNTER — TELEPHONE (OUTPATIENT)
Dept: DERMATOLOGY | Facility: CLINIC | Age: 68
End: 2023-10-06
Payer: MEDICARE

## 2023-10-06 ENCOUNTER — MYC MEDICAL ADVICE (OUTPATIENT)
Dept: DERMATOLOGY | Facility: CLINIC | Age: 68
End: 2023-10-06
Payer: MEDICARE

## 2023-10-06 DIAGNOSIS — D23.9 DYSPLASTIC NEVUS: Primary | ICD-10-CM

## 2023-10-09 ENCOUNTER — TELEPHONE (OUTPATIENT)
Dept: DERMATOLOGY | Facility: CLINIC | Age: 68
End: 2023-10-09
Payer: MEDICARE

## 2023-10-09 NOTE — TELEPHONE ENCOUNTER
Called patient to schedule surgery with Dr. Bill    Date of Surgery: 12/19    Surgery type: excision    Consult scheduled: No    Has patient had mohs with us before? Not Applicable    Additional comments: yvette Betancourt on 10/9/2023 at 2:12 PM

## 2023-10-09 NOTE — TELEPHONE ENCOUNTER
Left patient a voicemail to schedule for   Right Back, Excision, Dysplastic Nevus    with Dr. Bill, Dr. Morales, or a resident. Provided my direct phone number.    Julia Betancourt on 10/9/2023 at 11:24 AM

## 2023-11-27 ENCOUNTER — TELEPHONE (OUTPATIENT)
Dept: URGENT CARE | Facility: URGENT CARE | Age: 68
End: 2023-11-27

## 2023-11-27 ENCOUNTER — OFFICE VISIT (OUTPATIENT)
Dept: URGENT CARE | Facility: URGENT CARE | Age: 68
End: 2023-11-27
Payer: MEDICARE

## 2023-11-27 VITALS
DIASTOLIC BLOOD PRESSURE: 101 MMHG | WEIGHT: 134 LBS | OXYGEN SATURATION: 100 % | TEMPERATURE: 98 F | SYSTOLIC BLOOD PRESSURE: 161 MMHG | BODY MASS INDEX: 22.3 KG/M2 | HEART RATE: 68 BPM

## 2023-11-27 DIAGNOSIS — C50.911 INVASIVE DUCTAL CARCINOMA OF BREAST, FEMALE, RIGHT (H): ICD-10-CM

## 2023-11-27 DIAGNOSIS — S80.812A ABRASION OF LEFT LOWER EXTREMITY, INITIAL ENCOUNTER: ICD-10-CM

## 2023-11-27 DIAGNOSIS — S81.012A KNEE LACERATION, LEFT, INITIAL ENCOUNTER: Primary | ICD-10-CM

## 2023-11-27 PROCEDURE — 99213 OFFICE O/P EST LOW 20 MIN: CPT | Mod: 25

## 2023-11-27 PROCEDURE — 12002 RPR S/N/AX/GEN/TRNK2.6-7.5CM: CPT | Mod: LT

## 2023-11-27 RX ORDER — CEPHALEXIN 500 MG/1
500 CAPSULE ORAL 3 TIMES DAILY
Qty: 21 CAPSULE | Refills: 0 | Status: SHIPPED | OUTPATIENT
Start: 2023-11-27 | End: 2024-08-29

## 2023-11-27 NOTE — PROGRESS NOTES
Assessment:       ICD-10-CM    1. Knee laceration, left, initial encounter  S81.012A REPAIR SUPERFICIAL, WOUND BODY 2.6-7.5 CM     cephALEXin (KEFLEX) 500 MG capsule      2. Abrasion of left lower extremity, initial encounter  S80.812A REPAIR SUPERFICIAL, WOUND BODY 2.6-7.5 CM     cephALEXin (KEFLEX) 500 MG capsule      3. Invasive ductal carcinoma of breast, female, right (H)  C50.911       Due to history of breast cancer and current anastrozole prescription will go ahead and treat her with cephalexin to prevent infection of this contaminated wound.     Plan:    Imaging of the injured area for foreign body or fracture was not  Indicated    Wound closed per procedure note below.     Discussed home wound care. Return to  with increased swelling, pain, redness, pus or fevers.  Follow up for Suture removal in 10 days.     Patient was discharged in stable condition.  Patient verbalized understanding and agreed with this plan.    HPI: Elisha Gao is an 68 year old female that presents to clinic after cutting self on the ground with a fall.  denies numbness of the knee or leg.  denies dysfunction of the knee. Patient denies any loss of strength to the knee or leg.  Patient is up to date on tetanus.       Vitals reviewed by IRASEMA MORA CNP  BP (!) 161/101 (BP Location: Left arm, Patient Position: Sitting, Cuff Size: Adult Regular)   Pulse 68   Temp 98  F (36.7  C) (Oral)   Wt 60.8 kg (134 lb)   SpO2 100%   Breastfeeding No   BMI 22.30 kg/m      Physical Exam:    Physical Exam      Procedure Note - Wound Repair:  Procedure performed by Irasema Baxter  Wound was irrigated thoroughly and cleansed with wound cleanser.  Anesthesia was obtained with 1% lidocaine with 1:100,000 Epinephrine via Local.  The wound, located on the left knee and shin, measured 3.4 cm and was ragged edges, contaminated, flap edge noted, 2 small pieces of skin lost.  The level of complexity was: simple .  The neurovascular exam was  normal.  It was cleaned with surgiscrub, irrigated with normal saline and explored.  The wound was closed using 4-0 Ethylon interrupted. A small amount of antibiotic ointment was used and dressing was then applied.    Abrasion on the shin was cleansed with wound cleanser then rinsed.  Patted dry and a bandage was applied    SAMARA MORA CNP 12:14 PM

## 2023-11-27 NOTE — PATIENT INSTRUCTIONS
Leave the initial dressing on for the first 24 hours.  After that you may remove it and shower normally.  After the shower pat dry apply a small amount of antibiotic ointment and cover again with bandage.  Use the antibiotic ointment for the next 3 days then just a dry bandage.     Stitches out in 10 days.      Over the lower abrasion shower normally then pat dry bacitracin and bandage for 3 to 5 days then just dry bandage or leave open to air.

## 2023-12-14 ENCOUNTER — OFFICE VISIT (OUTPATIENT)
Dept: INTERNAL MEDICINE | Facility: CLINIC | Age: 68
End: 2023-12-14
Payer: MEDICARE

## 2023-12-14 ENCOUNTER — LAB (OUTPATIENT)
Dept: LAB | Facility: CLINIC | Age: 68
End: 2023-12-14
Payer: MEDICARE

## 2023-12-14 VITALS
SYSTOLIC BLOOD PRESSURE: 121 MMHG | HEART RATE: 63 BPM | HEIGHT: 65 IN | DIASTOLIC BLOOD PRESSURE: 77 MMHG | OXYGEN SATURATION: 99 % | BODY MASS INDEX: 22.41 KG/M2 | WEIGHT: 134.5 LBS

## 2023-12-14 DIAGNOSIS — M17.0 PRIMARY OSTEOARTHRITIS OF BOTH KNEES: Primary | ICD-10-CM

## 2023-12-14 DIAGNOSIS — E78.2 MIXED HYPERLIPIDEMIA: ICD-10-CM

## 2023-12-14 LAB
CHOLEST SERPL-MCNC: 181 MG/DL
FASTING STATUS PATIENT QL REPORTED: YES
HDLC SERPL-MCNC: 81 MG/DL
LDLC SERPL CALC-MCNC: 87 MG/DL
NONHDLC SERPL-MCNC: 100 MG/DL
TRIGL SERPL-MCNC: 66 MG/DL

## 2023-12-14 PROCEDURE — 36415 COLL VENOUS BLD VENIPUNCTURE: CPT | Performed by: PATHOLOGY

## 2023-12-14 PROCEDURE — 20610 DRAIN/INJ JOINT/BURSA W/O US: CPT | Mod: 50 | Performed by: INTERNAL MEDICINE

## 2023-12-14 PROCEDURE — 80061 LIPID PANEL: CPT | Performed by: PATHOLOGY

## 2023-12-14 PROCEDURE — 99213 OFFICE O/P EST LOW 20 MIN: CPT | Mod: 25 | Performed by: INTERNAL MEDICINE

## 2023-12-14 RX ORDER — TRIAMCINOLONE ACETONIDE 40 MG/ML
40 INJECTION, SUSPENSION INTRA-ARTICULAR; INTRAMUSCULAR ONCE
Status: COMPLETED | OUTPATIENT
Start: 2023-12-14 | End: 2023-12-14

## 2023-12-14 RX ORDER — RESPIRATORY SYNCYTIAL VIRUS VACCINE 120MCG/0.5
0.5 KIT INTRAMUSCULAR ONCE
Qty: 1 EACH | Refills: 0 | Status: CANCELLED | OUTPATIENT
Start: 2023-12-14 | End: 2023-12-14

## 2023-12-14 RX ADMIN — TRIAMCINOLONE ACETONIDE 40 MG: 40 INJECTION, SUSPENSION INTRA-ARTICULAR; INTRAMUSCULAR at 09:21

## 2023-12-14 RX ADMIN — TRIAMCINOLONE ACETONIDE 40 MG: 40 INJECTION, SUSPENSION INTRA-ARTICULAR; INTRAMUSCULAR at 09:23

## 2023-12-14 ASSESSMENT — ANXIETY QUESTIONNAIRES
GAD7 TOTAL SCORE: 0
5. BEING SO RESTLESS THAT IT IS HARD TO SIT STILL: NOT AT ALL
7. FEELING AFRAID AS IF SOMETHING AWFUL MIGHT HAPPEN: NOT AT ALL
1. FEELING NERVOUS, ANXIOUS, OR ON EDGE: NOT AT ALL
6. BECOMING EASILY ANNOYED OR IRRITABLE: NOT AT ALL
3. WORRYING TOO MUCH ABOUT DIFFERENT THINGS: NOT AT ALL
GAD7 TOTAL SCORE: 0
2. NOT BEING ABLE TO STOP OR CONTROL WORRYING: NOT AT ALL

## 2023-12-14 ASSESSMENT — PATIENT HEALTH QUESTIONNAIRE - PHQ9
SUM OF ALL RESPONSES TO PHQ QUESTIONS 1-9: 0
5. POOR APPETITE OR OVEREATING: NOT AT ALL

## 2023-12-14 NOTE — PROGRESS NOTES
Elisha is a 68 year old that presents in clinic today for the following:     Chief Complaint   Patient presents with    Follow Up     Check cholesterol, cortisone shots in both knees           12/14/2023     8:01 AM   Additional Questions   Roomed by [facility] EMT       Screenings from encounters over the past 10 days    No data recorded       Elizabeth Momin MA at 8:04 AM on 12/14/2023

## 2023-12-14 NOTE — PROGRESS NOTES
History of Present Illness:  Ms. Gao is a 68 year old female who presents for  Chief Complaint   Patient presents with    Follow Up     Check cholesterol, cortisone shots in both knees     Elisha has been doing well in the interim. Some knee pain in the last month that has improved with rest in the last two weeks, she has stopped playing golf. Last set of knee injections provided ~5 mos of pain relief. Will occasionally take ibuprofen for pain but is not usually effective.   Had a fall on the treadmill, landing on L knee and had to get 10 stitches in patella. Stitches did not hold for long but site is closed, healing well. Still some bruising and scratches on the L shin. No trouble with walking and      Would like knee injections again today. Feels good now but anticipating a golf trip in February.   Fell on treadmill a few weeks ago, healing nicely.       Doing well on statin, good BPs. Repeat cholesterol, patient is fasting today.    Declined shingles, RSV vaccine.       Review of external notes as documented above     A detailed Review of Systems was performed, verified and is negative except as documented in the HPI.  All health questionnaires were reviewed, verified and relevant information documented above.    Past Medical History:  Past Medical History:   Diagnosis Date    Breast cancer (H) 03/2022    Chronic low back pain     for a couple years, worse at night, not evaluated    Depression     Essential tremor     Invasive ductal carcinoma of breast, female, right (H)     osteopenia     FRAX  11/2%   2022       Active Meds:  Current Outpatient Medications   Medication    anastrozole (ARIMIDEX) 1 MG tablet    buPROPion (WELLBUTRIN XL) 150 MG 24 hr tablet    cephALEXin (KEFLEX) 500 MG capsule    citalopram (CELEXA) 40 MG tablet    magnesium 250 MG tablet    ondansetron (ZOFRAN ODT) 4 MG ODT tab    simvastatin (ZOCOR) 20 MG tablet    Vitamin D, Cholecalciferol, 25 MCG (1000 UT) TABS     Current  "Facility-Administered Medications   Medication    lidocaine 1 % 50 mg    lidocaine 1 % 50 mg    triamcinolone (KENALOG-40) injection 40 mg    triamcinolone (KENALOG-40) injection 40 mg        Allergies:  Reviewed, refer to EMR    Relevant Social History:  Social History     Tobacco Use    Smoking status: Former     Packs/day: 0.50     Years: 15.00     Additional pack years: 0.00     Total pack years: 7.50     Types: Cigarettes     Start date: 1972     Quit date: 1987     Years since quittin.5    Smokeless tobacco: Never   Vaping Use    Vaping Use: Never used   Substance Use Topics    Alcohol use: Yes     Alcohol/week: 3.0 standard drinks of alcohol     Types: 3 Glasses of wine per week     Comment: Social    Drug use: Never        Physical Exam:  Vitals: /77 (BP Location: Right arm, Patient Position: Sitting, Cuff Size: Adult Regular)   Pulse 63   Ht 1.651 m (5' 5\")   Wt 61 kg (134 lb 8 oz)   SpO2 99%   BMI 22.38 kg/m    Constitutional: Alert, oriented, pleasant, no acute distress. Patient is pleasant and interactive with providers.   Head: Normocephalic, atraumatic  Eyes: Extra-ocular movements intact, pupils equally round bilaterally  ENT: Oropharynx clear, moist mucus membranes, dentition intact  Cardiovascular: Regular rate and rhythm, no murmurs, rubs or gallops, peripheral pulses full/symmetric  Respiratory: Good air movement bilaterally, normal work of breathing, lungs clear, no wheezes/rales/rhonchi  Musculoskeletal: No edema, normal muscle tone, normal gait  Neurologic: Alert and oriented, cranial nerves 2-12 intact, hand tremor present bilaterally with movement.   Skin: L patella with 3 cm wound that is closed, healing well with granulation tissue. No erythema or discharge from borders.  L shin with an abrasion and bruise of dark color that is healing.    Psychiatric: normal mentation, affect and mood      Diagnostics:  Labs reviewed in Epic    PREOPERATIVE DIAGNOSIS: Primary " osteoarthritis of both knees     POSTOPERATIVE DIAGNOSIS: Primary osteoarthritis of both knees     PROCEDURE: The patient was apprised of the risks and the benefits of the procedure and consented. The patient s bilateral knees were sterilely prepped with chloraprep. A 40 mg of kenalog was drawn up into a 5 mL syringe with a 4 mL of 1% lidocaine. The patient was injected with a 1.5-inch 25-gauze needle at the lateral aspect of her bilateral flexed knees. There were no complications. The patient tolerated the procedure well. There was minimal bleeding. The patient was instructed to ice his knee upon leaving clinic and refrain from overuse over the next 3 days. The patient was instructed to go to the emergency room with any usual pain, swelling, or redness occurred in the injected area. The patient was given a followup appointment to evaluate response to the injection to his increased range of motion and reduction of pain.              Assessment and Plan:  Elisha was seen today for follow up.    Diagnoses and all orders for this visit:    Primary osteoarthritis of both knees  Bilateral knee pain had increased in October. Reduction in activity and rest after fall on treadmill has helped with pain but patient would like to continue her active lifestyle and exercise regimen.   Discussed pain relief with injections is not a sustainable, long term treatment for pain as there is a reduction in relief over time as osteoarthritis progresses. Patient was understanding of long term management of osteoarthritis including total knee replacement at some point in the future. Patient is not clinically at a point indicated for additional workup at this time, will consider in the future. We proceeded with bilateral lidocaine injections into the knee joint space.   -     REVIEW OF HEALTH MAINTENANCE PROTOCOL ORDERS  -     lidocaine 1 % 50 mg  -     lidocaine 1 % 50 mg  -     triamcinolone (KENALOG-40) injection 40 mg  -      triamcinolone (KENALOG-40) injection 40 mg    Mixed hyperlipidemia  Patient has been tolerating simvastatin 20 mg well in last few months. Will repeat fasting lipid panel today for monitoring.   -     Lipid panel reflex to direct LDL Fasting; Future        Patient seen and discussed with Dr. Pizarro.    Sharon Franklin, MS3  [employer]    Attending Addendum:  The medical student acted as scribe.  The patient was seen and examined with medical student.  The history and physical were independently verified by myself.  The above documentation represents our joint assessment and plan.  Debi Pizarro MD  Internal Medicine

## 2023-12-17 RX ORDER — ZOLEDRONIC ACID 0.04 MG/ML
4 INJECTION, SOLUTION INTRAVENOUS ONCE
Status: CANCELLED | OUTPATIENT
Start: 2024-03-20 | End: 2024-03-20

## 2023-12-17 RX ORDER — HEPARIN SODIUM,PORCINE 10 UNIT/ML
5 VIAL (ML) INTRAVENOUS
Status: CANCELLED | OUTPATIENT
Start: 2024-03-20

## 2023-12-17 RX ORDER — HEPARIN SODIUM (PORCINE) LOCK FLUSH IV SOLN 100 UNIT/ML 100 UNIT/ML
5 SOLUTION INTRAVENOUS
Status: CANCELLED | OUTPATIENT
Start: 2024-03-20

## 2023-12-17 NOTE — PROGRESS NOTES
Oncology Visit:  Date on this visit: Dec 18, 2023    Diagnosis: right breast cancer.    Primary Physician: Debi Pizarro     History Of Present Illness:  Ms. Gao is a 68 year old female with right breast cancer.  Routine screening mammogram on 3/17/2022 showed an asymmetry in the upper outer right breast.  Diagnostic breast imaging confirmed a 4 mm spiculated mass at 11:00, 4 cm from the nipple of the right breast.  Biopsy was consistent with a grade 2 invasive ductal carcinoma, ER strong in 98%, SD moderate in 40%, and HER2 negative by FISH and atypical ductal hyperplasia.  Patient underwent right breast lumpectomy and right axillary sentinel lymph node biopsy under the care of Dr. Garcia on 4/11/2022.  Pathology showed a grade 2 invasive ductal carcinoma measuring 9 mm with associated low grade DCIS.  Surgical margins were negative.  There was no lymphovascular invasion and two sentinel lymph nodes were benign.  She completed radiation, 2600 cGy in 5 fractions, to the right breast on 5/25/2022.  She has been on anastrozole since 6/15/2022.    Interval History:   Ms. Gao comes into clinic for routine breast cancer follow up.  She is currently without concerns or complaints.  She continues on anastrozole and is tolerating the medication well.  She specifically denies joint stiffness, hot flashes, vaginal dryness, or mood disorder.  She denies concerning lumps, pain, or swelling of either breast.  She has been in good health.  She has no new bone or joint pains.  She has chronic lateral right hip discomfort, worse when she lies on her right side.  She denies cough, shortness of breath or chest pain.  She has no abdominal complaints.    Past Medical/Surgical History:  Past Medical History:   Diagnosis Date    Breast cancer (H) 03/2022    Chronic low back pain     for a couple years, worse at night, not evaluated    Depression     Essential tremor     Invasive ductal carcinoma of breast, female, right (H)      osteopenia     FRAX  11/2%   2022   Colonoscopy in 2012 w/o polyps, next due in 2022.    Past Surgical History:   Procedure Laterality Date    ABDOMEN SURGERY  1987    BREAST SURGERY      COLONOSCOPY  2020    GYN SURGERY  1987    HYSTERECTOMY  1987    endometriosis, ovarian cysts, hormones x 10  years    LASIK Bilateral     LUMPECTOMY, BREAST, LOCALIZED USING RADIOFREQUENCY IDENTIFICATION Right 04/11/2022    Procedure: LUMPECTOMY, BREAST, LOCALIZED USING RADIOFREQUENCY IDENTIFICATION, sentinel lymph node biopsy;  Surgeon: Santy Garcia MD;  Location: [facility] OR     Allergies:  Allergies as of 12/18/2023 - Reviewed 12/14/2023   Allergen Reaction Noted    Alendronate Nausea and Vomiting 05/26/2010    Raloxifene Nausea 05/26/2010    Risedronate Nausea and Vomiting 05/26/2010     Current Medications:  Current Outpatient Medications   Medication Sig Dispense Refill    anastrozole (ARIMIDEX) 1 MG tablet Take 1 tablet (1 mg) by mouth daily 90 tablet 3    buPROPion (WELLBUTRIN XL) 150 MG 24 hr tablet Take 1 tablet (150 mg) by mouth every morning 90 tablet 1    cephALEXin (KEFLEX) 500 MG capsule Take 1 capsule (500 mg) by mouth 3 times daily 21 capsule 0    citalopram (CELEXA) 40 MG tablet Take 1 tablet (40 mg) by mouth daily 90 tablet 1    magnesium 250 MG tablet Take 1 tablet by mouth every morning       ondansetron (ZOFRAN ODT) 4 MG ODT tab Take 1 tablet (4 mg) by mouth every 8 hours as needed for nausea 18 tablet 0    simvastatin (ZOCOR) 20 MG tablet Take 1 tablet (20 mg) by mouth At Bedtime 90 tablet 3    Vitamin D, Cholecalciferol, 25 MCG (1000 UT) TABS Take 3 tablets by mouth every morning         Family and Social History:  See initial consultation dated 4/4/2022 for further details.  Breast Actionable panel on 4/4/2022 was negative for germline genetic mutation.    Physical Exam:  BP (!) 152/84 (BP Location: Right arm, Patient Position: Sitting, Cuff Size: Adult Regular)   Pulse 79   Temp 98.1  F (36.7  C) (Oral)    Resp 16   Wt 60.7 kg (133 lb 12.8 oz)   SpO2 96%   BMI 22.27 kg/m    General:  Well appearing adult female in NAD.  Alert and oriented x 3.  HEENT:  Normocephalic.  Sclera anicteric.  MMM.    Lymph:  No palpable cervical, supraclavicular, or axillary LAD.  Chest:  Breathing comfortably on room air.  No wheezing.  Breast:  Right axillary and right upper outer breast incision are well healed. There is some fibrosis along the upper outer lumpectomy site unchanged from prior. There are no discretely palpable masses in either breast.  Bilateral nipples are everted and without discharge.   Abd:  Soft/ND  Ext:  No edema of the bilateral lower extremities.   Neuro:  Cranial nerves grossly intact.  Gait stable.  Psych:  Mood and affect appear normal.  Skin:  No visible concerning skin rashes or lesions.    Laboratory/Imaging Studies   Bilateral diagnostic mammograms:  I personally reviewed the images in clinic today.  There are no new or concerning findings when compared to prior.    ASSESSMENT/PLAN:  69 yo female with stage Ia, [id number], grade 2, ER/AR positive, HER-2 negative invasive ductal carcinoma of the right breast.   She is s/p right breast lumpectomy and right axillary sentinel lymph node procedure.    1.  Right breast cancer:  Ms. Gao is 1 year, 8 months out from excision of a right breast cancer.  She is on treatment with anastrozole.  Plan is to treat with a total 5 years of endocrine therapy.    There are no concerning signs or symptoms of recurrence today.   Bilateral diagnostic mammograms are without concerning findings.  Return to clinic in 3 months.     2.  Osteopenia:  DEXA bone density scan 3/17/2022 with a lowest T-score of -2.0 and is consistent with osteopenia.  Zometa 4 mg IV once every 6 months for 3 years, to prevent bone loss on aromatase inhibitor therapy and also reduce the risk of breast cancer bone metastasis was started on 9/14/2022.    - C4 Zometa due at the time of return visit in 3  months.    3.  Follow up:  Labs, visit with Caprice, and Zometa infusion in 3 months.  Visit with me in 7 months.

## 2023-12-18 ENCOUNTER — ANCILLARY PROCEDURE (OUTPATIENT)
Dept: MAMMOGRAPHY | Facility: CLINIC | Age: 68
End: 2023-12-18
Attending: NURSE PRACTITIONER
Payer: MEDICARE

## 2023-12-18 ENCOUNTER — ONCOLOGY VISIT (OUTPATIENT)
Dept: ONCOLOGY | Facility: CLINIC | Age: 68
End: 2023-12-18
Attending: INTERNAL MEDICINE
Payer: MEDICARE

## 2023-12-18 VITALS
HEART RATE: 79 BPM | SYSTOLIC BLOOD PRESSURE: 152 MMHG | RESPIRATION RATE: 16 BRPM | WEIGHT: 133.8 LBS | TEMPERATURE: 98.1 F | DIASTOLIC BLOOD PRESSURE: 84 MMHG | OXYGEN SATURATION: 96 % | BODY MASS INDEX: 22.27 KG/M2

## 2023-12-18 DIAGNOSIS — Z12.31 VISIT FOR SCREENING MAMMOGRAM: ICD-10-CM

## 2023-12-18 DIAGNOSIS — Z17.0 MALIGNANT NEOPLASM OF UPPER-OUTER QUADRANT OF RIGHT BREAST IN FEMALE, ESTROGEN RECEPTOR POSITIVE (H): ICD-10-CM

## 2023-12-18 DIAGNOSIS — C50.411 MALIGNANT NEOPLASM OF UPPER-OUTER QUADRANT OF RIGHT BREAST IN FEMALE, ESTROGEN RECEPTOR POSITIVE (H): Primary | ICD-10-CM

## 2023-12-18 DIAGNOSIS — C50.411 MALIGNANT NEOPLASM OF UPPER-OUTER QUADRANT OF RIGHT BREAST IN FEMALE, ESTROGEN RECEPTOR POSITIVE (H): ICD-10-CM

## 2023-12-18 DIAGNOSIS — Z17.0 MALIGNANT NEOPLASM OF UPPER-OUTER QUADRANT OF RIGHT BREAST IN FEMALE, ESTROGEN RECEPTOR POSITIVE (H): Primary | ICD-10-CM

## 2023-12-18 PROCEDURE — 77066 DX MAMMO INCL CAD BI: CPT | Performed by: RADIOLOGY

## 2023-12-18 PROCEDURE — G0279 TOMOSYNTHESIS, MAMMO: HCPCS | Performed by: RADIOLOGY

## 2023-12-18 PROCEDURE — 99214 OFFICE O/P EST MOD 30 MIN: CPT | Performed by: INTERNAL MEDICINE

## 2023-12-18 PROCEDURE — G0463 HOSPITAL OUTPT CLINIC VISIT: HCPCS | Performed by: INTERNAL MEDICINE

## 2023-12-18 ASSESSMENT — PAIN SCALES - GENERAL: PAINLEVEL: NO PAIN (0)

## 2023-12-18 NOTE — NURSING NOTE
"Oncology Rooming Note    December 18, 2023 3:13 PM   Elisha Gao is a 68 year old female who presents for:    Chief Complaint   Patient presents with    Oncology Clinic Visit     Malignant neoplasm of upper-outer quadrant of right breast in female, estrogen receptor positive     Initial Vitals: BP (!) 152/84 (BP Location: Right arm, Patient Position: Sitting, Cuff Size: Adult Regular)   Pulse 79   Temp 98.1  F (36.7  C) (Oral)   Resp 16   Wt 60.7 kg (133 lb 12.8 oz)   SpO2 96%   BMI 22.27 kg/m   Estimated body mass index is 22.27 kg/m  as calculated from the following:    Height as of 12/14/23: 1.651 m (5' 5\").    Weight as of this encounter: 60.7 kg (133 lb 12.8 oz). Body surface area is 1.67 meters squared.  No Pain (0) Comment: Data Unavailable   No LMP recorded. Patient is postmenopausal.  Allergies reviewed: Yes  Medications reviewed: Yes    Medications: Medication refills not needed today.  Pharmacy name entered into [facility]:    [facility] - [place], [place] - [address] [place] [address] PHARMACY [place], [place] - [address] [place] [address] [phone]  [place], [place] - [address] [place] [phone]    Clinical concerns: none       Pennie Samayoa              "

## 2023-12-18 NOTE — LETTER
12/18/2023         RE: Elisha Gao  [address]  [facility] [address]        Dear Colleague,    Thank you for referring your patient, Elisha Gao, to the [facility]. Please see a copy of my visit note below.    Oncology Visit:  Date on this visit: Dec 18, 2023    Diagnosis: right breast cancer.    Primary Physician: Debi Pizarro     History Of Present Illness:  Ms. Gao is a 68 year old female with right breast cancer.  Routine screening mammogram on 3/17/2022 showed an asymmetry in the upper outer right breast.  Diagnostic breast imaging confirmed a 4 mm spiculated mass at 11:00, 4 cm from the nipple of the right breast.  Biopsy was consistent with a grade 2 invasive ductal carcinoma, ER strong in 98%, [place] moderate in 40%, and HER2 negative by FISH and atypical ductal hyperplasia.  Patient underwent right breast lumpectomy and right axillary sentinel lymph node biopsy under the care of Dr. Garcia on 4/11/2022.  Pathology showed a grade 2 invasive ductal carcinoma measuring 9 mm with associated low grade DCIS.  Surgical margins were negative.  There was no lymphovascular invasion and two sentinel lymph nodes were benign.  She completed radiation, 2600 cGy in 5 fractions, to the right breast on 5/25/2022.  She has been on anastrozole since 6/15/2022.    Interval History:   Ms. Gao comes into clinic for routine breast cancer follow up.  She is currently without concerns or complaints.  She continues on anastrozole and is tolerating the medication well.  She specifically denies joint stiffness, hot flashes, vaginal dryness, or mood disorder.  She denies concerning lumps, pain, or swelling of either breast.  She has been in good health.  She has no new bone or joint pains.  She has chronic lateral right hip discomfort, worse when she lies on her right side.  She denies cough, shortness of breath or chest pain.  She has no abdominal complaints.    Past Medical/Surgical  History:  Past Medical History:   Diagnosis Date    Breast cancer (H) 03/2022    Chronic low back pain     for a couple years, worse at night, not evaluated    Depression     Essential tremor     Invasive ductal carcinoma of breast, female, right (H)     osteopenia     FRAX  11/2%   2022   Colonoscopy in 2012 w/o polyps, next due in 2022.    Past Surgical History:   Procedure Laterality Date    ABDOMEN SURGERY  1987    BREAST SURGERY      COLONOSCOPY  2020    GYN SURGERY  1987    HYSTERECTOMY  1987    endometriosis, ovarian cysts, hormones x 10  years    LASIK Bilateral     LUMPECTOMY, BREAST, LOCALIZED USING RADIOFREQUENCY IDENTIFICATION Right 04/11/2022    Procedure: LUMPECTOMY, BREAST, LOCALIZED USING RADIOFREQUENCY IDENTIFICATION, sentinel lymph node biopsy;  Surgeon: Santy Garcia MD;  Location: [facility] OR     Allergies:  Allergies as of 12/18/2023 - Reviewed 12/14/2023   Allergen Reaction Noted    Alendronate Nausea and Vomiting 05/26/2010    Raloxifene Nausea 05/26/2010    Risedronate Nausea and Vomiting 05/26/2010     Current Medications:  Current Outpatient Medications   Medication Sig Dispense Refill    anastrozole (ARIMIDEX) 1 MG tablet Take 1 tablet (1 mg) by mouth daily 90 tablet 3    buPROPion (WELLBUTRIN XL) 150 MG 24 hr tablet Take 1 tablet (150 mg) by mouth every morning 90 tablet 1    cephALEXin (KEFLEX) 500 MG capsule Take 1 capsule (500 mg) by mouth 3 times daily 21 capsule 0    citalopram (CELEXA) 40 MG tablet Take 1 tablet (40 mg) by mouth daily 90 tablet 1    magnesium 250 MG tablet Take 1 tablet by mouth every morning       ondansetron (ZOFRAN ODT) 4 MG ODT tab Take 1 tablet (4 mg) by mouth every 8 hours as needed for nausea 18 tablet 0    simvastatin (ZOCOR) 20 MG tablet Take 1 tablet (20 mg) by mouth At Bedtime 90 tablet 3    Vitamin D, Cholecalciferol, 25 MCG (1000 UT) TABS Take 3 tablets by mouth every morning         Family and Social History:  See initial consultation dated 4/4/2022 for  further details.  Breast Actionable panel on 4/4/2022 was negative for germline genetic mutation.    Physical Exam:  BP (!) 152/84 (BP Location: Right arm, Patient Position: Sitting, Cuff Size: Adult Regular)   Pulse 79   Temp 98.1  F (36.7  C) (Oral)   Resp 16   Wt 60.7 kg (133 lb 12.8 oz)   SpO2 96%   BMI 22.27 kg/m    General:  Well appearing adult female in NAD.  Alert and oriented x 3.  HEENT:  Normocephalic.  Sclera anicteric.  MMM.    Lymph:  No palpable cervical, supraclavicular, or axillary LAD.  Chest:  Breathing comfortably on room air.  No wheezing.  Breast:  Right axillary and right upper outer breast incision are well healed. There is some fibrosis along the upper outer lumpectomy site unchanged from prior. There are no discretely palpable masses in either breast.  Bilateral nipples are everted and without discharge.   Abd:  Soft/ND  Ext:  No edema of the bilateral lower extremities.   Neuro:  Cranial nerves grossly intact.  Gait stable.  Psych:  Mood and affect appear normal.  Skin:  No visible concerning skin rashes or lesions.    Laboratory/Imaging Studies   Bilateral diagnostic mammograms:  I personally reviewed the images in clinic today.  There are no new or concerning findings when compared to prior.    ASSESSMENT/PLAN:  69 yo female with stage Ia, [id number], grade 2, ER/GA positive, HER-2 negative invasive ductal carcinoma of the right breast.   She is s/p right breast lumpectomy and right axillary sentinel lymph node procedure.    1.  Right breast cancer:  Ms. Gao is 1 year, 8 months out from excision of a right breast cancer.  She is on treatment with anastrozole.  Plan is to treat with a total 5 years of endocrine therapy.    There are no concerning signs or symptoms of recurrence today.   Bilateral diagnostic mammograms are without concerning findings.  Return to clinic in 3 months.     2.  Osteopenia:  DEXA bone density scan 3/17/2022 with a lowest T-score of -2.0 and is consistent  with osteopenia.  Zometa 4 mg IV once every 6 months for 3 years, to prevent bone loss on aromatase inhibitor therapy and also reduce the risk of breast cancer bone metastasis was started on 9/14/2022.    - C4 Zometa due at the time of return visit in 3 months.    3.  Follow up:  Labs, visit with Caprice, and Zometa infusion in 3 months.  Visit with me in 7 months.      Gardenia Kaplan MD

## 2023-12-19 ENCOUNTER — OFFICE VISIT (OUTPATIENT)
Dept: DERMATOLOGY | Facility: CLINIC | Age: 68
End: 2023-12-19
Payer: MEDICARE

## 2023-12-19 VITALS — DIASTOLIC BLOOD PRESSURE: 87 MMHG | HEART RATE: 75 BPM | SYSTOLIC BLOOD PRESSURE: 133 MMHG

## 2023-12-19 DIAGNOSIS — D23.9 DYSPLASTIC NEVUS: ICD-10-CM

## 2023-12-19 PROCEDURE — 12032 INTMD RPR S/A/T/EXT 2.6-7.5: CPT | Mod: GC | Performed by: DERMATOLOGY

## 2023-12-19 PROCEDURE — 11402 EXC TR-EXT B9+MARG 1.1-2 CM: CPT | Mod: GC | Performed by: DERMATOLOGY

## 2023-12-19 PROCEDURE — 88305 TISSUE EXAM BY PATHOLOGIST: CPT | Mod: 26 | Performed by: DERMATOLOGY

## 2023-12-19 PROCEDURE — 88305 TISSUE EXAM BY PATHOLOGIST: CPT | Mod: TC | Performed by: DERMATOLOGY

## 2023-12-19 NOTE — PATIENT INSTRUCTIONS
Wound care    I will experience scar, bleeding, swelling, pain, crusting and redness. I may experience incomplete removal or recurrence. Risks are bleeding, bruising, swelling, infection, nerve damage, & a large wound. A second procedure may be recommended to obtain the best cosmetic or functional result.       A three month office visit with your Surgeon is recommended for scar evaluation. Please reach out sooner if you have concerns about you surgical site/wound.    Caring for your skin after surgery    After your surgery, a pressure bandage will be placed over the area that has stitches. This is important to prevent bleeding. Please follow these instructions over the next 1 to 2 weeks. Following this regimen will help to prevent complications as your wound heals.     For the first 48 hours after your surgery:    Leave the pressure dressing on and keep it dry. If it should come loose, you may re-tape it, but do not take it off.  Relax and take it easy. Do not do any vigorous exercise or heavy lifting. This could cause the wound to bleed.  Post-Operative pain is usually mild. If you are able to take tylenol, You may take plain or extra-strength Tylenol (acetaminophen) As directed on the bottle (do not take more than 4,000mg in one day). If you are able to take ibuprofen, you can alternate the tylenol and ibuprofen.   Avoid alcohol as this may increase your tendency to bleed.   You may put an ice pack around the bandaged area for 20 minutes at a time as needed. This may help reduce swelling, bruising, and pain. Make sure the ice pack is waterproof so that the pressure bandage doesn t get wet.  If the wound is on the face try to sleep with your head elevated. Either in a recliner or propped up in bed, this will decrease swelling around the eyes.   You may see a small amount of drainage or blood on your pressure bandage. This is normal. However:  If drainage or bleeding continues or saturates the bandage, you will  "need to apply firm pressure over the bandage with a piece of gauze for 15 minutes.  If bleeding continues after applying pressure for 15 minutes, apply an ice pack to the bandaged area for 15 minutes.  If bleeding still continues, call our office or go to the nearest emergency room.    Remove pressure dressing 48 hours after surgery:    Carefully remove the pressure bandage. If it seems sticky or too difficult to get off, you may need to soak it off in the shower.  After the pressure dressing is removed, you may shower and get the wound wet. However, Do Not let the forceful stream of the shower hit the wound directly.  Follow these wound care and dressing change instructions:    You have steri strips, skin glue (purplish/clear), and tegaderm (clear film) over your incision line, you can shower.   You may allow water to run over the site. Do not soak.  Do Not rub or scrub the site.  Pat dry after the shower or bath.  Avoid topical medications, lotions, creams, ointment,or oils.  Do not use tanning lamps or expose the site to sun.   Check wound appearance daily, some swelling and redness is normal after a procedure but should go away as your would is healing. If the swelling and redness or pain increases or if any other signs of infection occur listed below please send in a photo via my chart and or call us to let us know.  The clear film should start peeling off approximately around 2 weeks. By this time your wound should be sufficiently healed. If it still looks to be healing when the glue comes off you can clean the wound with soapy warm water daily in the shower and apply Vaseline to the incision line.   Once the clear film starts peeling off to the point where water is getting trapped under the clear film, please gently peel off.        If you are able to take acetaminophen (\"Tylenol,\" etc.) and ibuprofen (\"Advil\" or \"Motrin,\" etc.), then you may STAGGER these medications by taking 400 mg of ibuprofen (usually " two tabs) every 8 hours and 1,000 mg of acetaminophen (e.g., two tabs of extra-strength Tylenol) every 8 hours.    This means, for example, that you could take the followin,000 mg of Tylenol, followed 4 hours later by 400 mg Ibuprofen, followed 4 hours later with 1,000 mg of Tylenol, followed 4 hours later by 400 mg Ibuprofen, followed 4 hours later with 1,000 mg of Tylenol, and so forth.     Essentially, you can take either 1,000 mg of Tylenol or 400 mg of ibuprofen in alternating fashion EVERY FOUR HOURS.    Do NOT exceed more than 4,000 mg of Tylenol or 3,200 mg of ibuprofen per 24 hours. If you are not able to take Tylenol or ibuprofen as above due to other health issues (or a physician has told you directly that you are not allowed to take one of them, say due to pre-existing severe liver or kidney issues), then disregard the above directions.    Scientific evidence supports that this combination/schedule of pain medications is just as effective, if not more effective, than taking a narcotic pain medicine.       Follow up will be a 3 month scar evaluation either in person or via a telephone visit with you sending in a photo via [employer]. Unless you have been told to follow up sooner or if you have concerns and would like to be see sooner. Please call or send us in a [employer] message if possible and attach a photo.        What to expect:    The first couple of days your wound may be tender and may bleed slightly when doing wound care.  There may be swelling and bruising around the wound, especially if it is near the eyes. For your comfort, you may apply ice or cold compresses to the bruises after your have removed the pressure bandage.  The area around your wound may be numb for several weeks or even months.  You may experience periodic sharp pain or mild itching around the wound as it heals.   The suture line will look dark for a while but will lighten over time.       When to call us:    You have bleeding  that will not stop after applying pressure and ice.  You have pain that is not controlled with Tylenol (acetaminophen.)  You have signs or symptoms of an infection such as:  Fever over 100 degrees Fahrenheit  Redness, warmth or foul-smelling drainage from the wound  If you have any questions, or are not sure how to take care of the wound.    Phone numbers:    During business hours (M-F 8:00-4:30 p.m.)  [facility]-  [phone] Option 1 appt. desk  [phone]  Option 3 nurse triage line  ---------------------------------------------------------  Evenings/Weekends/Holidays  Hospital - [phone]   TTY for hearing [id number]-[phone]  *Ask  to page dermatologist on-call  Emergency Nexf-[phone]  TTY for hearing impaired- [phone]

## 2023-12-19 NOTE — NURSING NOTE
Chief Complaint   Patient presents with    Derm Problem     R back excision nevus     Maria Luisa BLAKELY, RN-BSN  Dermatology Surgery  [phone]

## 2023-12-19 NOTE — PROGRESS NOTES
DERMATOLOGY EXCISION PROCEDURE NOTE    Dermatology Problem List:  LAST FBSE:  9/29/2023   #. DN, R back, s/p shave bx 9/29/23, s/p excision 12/19/23    NAME OF PROCEDURE: Excision intermediate layered linear closure  Staff surgeon: Dar Bill MD.   Fellow: Cecilia Gibbs MD  Scrub Nurse: Maria Luisa Cuevas RN    PRE-OPERATIVE DIAGNOSIS:  Dysplastic nevus  POST-OPERATIVE DIAGNOSIS: Same   LOCATION: R Back  FINAL EXCISION SIZE(DEFECT SIZE): 1.6 x 1.6 cm  MARGIN: 0.4 cm  FINAL REPAIR LENGTH: 4 cm   ANESTHESIA: 9 cc 1% lidocaine with 1:100,000 epinephrine    INDICATIONS: This patient presented with a 0.8 x 0.8 cm Dysplastic nevus. Excision was indicated. We discussed the principles of treatment and most likely complications including scarring, bleeding, infection, incomplete excision, wound dehiscence, pain, nerve damage, and recurrence. Informed consent was obtained and the patient underwent the procedure as follows:    PROCEDURE: The patient was taken to the operative suite. Time-out was performed.  The treatment area was anesthetized with 1% lidocaine with epinephrine. The area was prepped with Chlorhexidine and rinsed with sterile saline and draped with sterile towels. The lesion was delineated and excised down to subcutaneous fat in a elliptical manner. Hemostasis was obtained by hyfrecator.     REPAIR: An intermediate layered linear closure was selected as the procedure which would maximally preserve both function and cosmesis.    After the excision of the tumor, the area was carefully undermined. Hemostasis was obtained with hyfrecator.  Closure was oriented so that the wound was in the patient's natural skin tension lines. The subcutaneous and dermal layers were then closed with 4-0 Monocryl sutures. The epidermis was then carefully approximated along the length of the wound using 4-0 Monocryl running subcuticular sutures.     Estimated blood loss was less than 10 ml for all surgical sites. A sterile pressure  dressing was applied and wound care instructions, with a written handout, were given. The patient was discharged from the [facility] alert and ambulatory.    The patient elected for pathology results to automatically release and understands that the clinical staff will contact them as soon as possible to notify them of the results.    Dr. Bill was immediately available for the entire surgery.    Anatomic Pathology Results: pending    Clinical Follow-Up: 1 year from last skin check with general dermatology     Staff Involved:  Scribe/Staff    Scribe Disclosure:   I, MAINE HUIZAR, am serving as a scribe; to document services personally performed by Dar Bill MD -based on data collection and the provider's statements to me.     Cecilia Gibbs MD  Micrographic Surgery and Dermatologic Oncology (MSDO) Fellow, PGY-5       Attending attestation:  I was present for key elements of the procedure and immediately available for all other portions of the procedure.  I have reviewed the note and edited it as necessary.    Dar Bill M.D.  Professor  Director of Dermatologic Surgery  Department of Dermatology  [facility]    Dermatology Surgery Clinic  [facility]  [address] [place], [place] [id number]

## 2023-12-19 NOTE — LETTER
12/19/2023       RE: Elisha Gao  [address]  [place] [facility] [address]     Dear Colleague,    Thank you for referring your patient, Elisha Gao, to the [facility] [place] [facility]. Please see a copy of my visit note below.    DERMATOLOGY EXCISION PROCEDURE NOTE    Dermatology Problem List:  LAST FBSE:  9/29/2023   #. DN, R back, s/p shave bx 9/29/23, s/p excision 12/19/23    NAME OF PROCEDURE: Excision intermediate layered linear closure  Staff surgeon: Dar Bill MD.   Fellow: Cecilia Gibbs MD  Scrub Nurse: Maria Luisa Cuevas RN    PRE-OPERATIVE DIAGNOSIS:  Dysplastic nevus  POST-OPERATIVE DIAGNOSIS: Same   LOCATION: R Back  FINAL EXCISION SIZE(DEFECT SIZE): 1.6 x 1.6 cm  MARGIN: 0.4 cm  FINAL REPAIR LENGTH: 4 cm   ANESTHESIA: 9 cc 1% lidocaine with 1:100,000 epinephrine    INDICATIONS: This patient presented with a 0.8 x 0.8 cm Dysplastic nevus. Excision was indicated. We discussed the principles of treatment and most likely complications including scarring, bleeding, infection, incomplete excision, wound dehiscence, pain, nerve damage, and recurrence. Informed consent was obtained and the patient underwent the procedure as follows:    PROCEDURE: The patient was taken to the operative suite. Time-out was performed.  The treatment area was anesthetized with 1% lidocaine with epinephrine. The area was prepped with Chlorhexidine and rinsed with sterile saline and draped with sterile towels. The lesion was delineated and excised down to subcutaneous fat in a elliptical manner. Hemostasis was obtained by hyfrecator.     REPAIR: An intermediate layered linear closure was selected as the procedure which would maximally preserve both function and cosmesis.    After the excision of the tumor, the area was carefully undermined. Hemostasis was obtained with hyfrecator.  Closure was oriented so that the wound was in the  patient's natural skin tension lines. The subcutaneous and dermal layers were then closed with 4-0 Monocryl sutures. The epidermis was then carefully approximated along the length of the wound using 4-0 Monocryl running subcuticular sutures.     Estimated blood loss was less than 10 ml for all surgical sites. A sterile pressure dressing was applied and wound care instructions, with a written handout, were given. The patient was discharged from the [facility] Center alert and ambulatory.    The patient elected for pathology results to automatically release and understands that the clinical staff will contact them as soon as possible to notify them of the results.    Dr. Bill was immediately available for the entire surgery.    Anatomic Pathology Results: pending    Clinical Follow-Up: 1 year from last skin check with general dermatology     Staff Involved:  Scribe/Staff    Scribe Disclosure:   I, MAINE HUIZAR, am serving as a scribe; to document services personally performed by Dar Bill MD -based on data collection and the provider's statements to me.     Cecilia Gibbs MD  Micrographic Surgery and Dermatologic Oncology (MSDO) Fellow, PGY-5       Attending attestation:  I was present for key elements of the procedure and immediately available for all other portions of the procedure.  I have reviewed the note and edited it as necessary.    Dar Bill M.D.  Professor  Director of Dermatologic Surgery  Department of Dermatology  [facility]    Dermatology Surgery Clinic  [facility]  [address] [place] [address]

## 2023-12-20 ENCOUNTER — TELEPHONE (OUTPATIENT)
Dept: DERMATOLOGY | Facility: CLINIC | Age: 68
End: 2023-12-20
Payer: MEDICARE

## 2023-12-20 NOTE — TELEPHONE ENCOUNTER
Follow up call completed following procedure with Dr. Bill. Voicemail left for patient encouraging him to please call [phone] option 3 if you have any questions or concerns.    Tawanna ASHLEY RN

## 2023-12-21 LAB
PATH REPORT.COMMENTS IMP SPEC: NORMAL
PATH REPORT.COMMENTS IMP SPEC: NORMAL
PATH REPORT.FINAL DX SPEC: NORMAL
PATH REPORT.GROSS SPEC: NORMAL
PATH REPORT.MICROSCOPIC SPEC OTHER STN: NORMAL
PATH REPORT.RELEVANT HX SPEC: NORMAL

## 2023-12-29 ENCOUNTER — TELEPHONE (OUTPATIENT)
Dept: INTERNAL MEDICINE | Facility: CLINIC | Age: 68
End: 2023-12-29
Payer: MEDICARE

## 2023-12-29 DIAGNOSIS — M25.552 HIP PAIN, LEFT: Primary | ICD-10-CM

## 2023-12-29 DIAGNOSIS — M25.551 HIP PAIN, RIGHT: ICD-10-CM

## 2023-12-29 NOTE — TELEPHONE ENCOUNTER
"Message from Patient on 12/29/23:    \"I am having worsening hip pain. Shooting pain often and generalized pain when sitting, standing or walking.  Wondering if you could give me a cortisone injection or if I should schedule an appointment with an orthopedist. \"    Kellie Fernandez RN on 12/29/2023 at 9:01 AM    "

## 2024-01-05 NOTE — CONFIDENTIAL NOTE
Ghassan Hopkins,  I am happy to see patient, but if it's the hip joint that needs an injection, this should be done with Orthopedics.  I can place a referral for her.    Best,  Debi Pizarro MD  Internal Medicine

## 2024-01-17 ENCOUNTER — MYC REFILL (OUTPATIENT)
Dept: INTERNAL MEDICINE | Facility: CLINIC | Age: 69
End: 2024-01-17
Payer: MEDICARE

## 2024-01-17 DIAGNOSIS — F32.4 MAJOR DEPRESSIVE DISORDER IN PARTIAL REMISSION, UNSPECIFIED WHETHER RECURRENT (H): ICD-10-CM

## 2024-01-17 RX ORDER — CITALOPRAM HYDROBROMIDE 40 MG/1
40 TABLET ORAL DAILY
Qty: 90 TABLET | Refills: 1 | Status: SHIPPED | OUTPATIENT
Start: 2024-01-17 | End: 2024-07-30

## 2024-01-17 RX ORDER — BUPROPION HYDROCHLORIDE 150 MG/1
150 TABLET ORAL EVERY MORNING
Qty: 90 TABLET | Refills: 1 | Status: SHIPPED | OUTPATIENT
Start: 2024-01-17 | End: 2024-07-30

## 2024-01-17 NOTE — TELEPHONE ENCOUNTER
citalopram (CELEXA) 40 MG tablet   Last Written Prescription Date:  7/13/2023  Last Fill Quantity: 90,   # refills: 1  Last Office Visit : 12/14/2023  Future Office visit:  None  90 Tabs, 1 Refill sent to pharm for Pt care.     SSRIs Protocol [id number]/17/2024 08:29 AM   Protocol Details PHQ-9 score less than 5 in past 6 months    Medication is Bupropion    Medication is active on med list    Patient is age 18 or older    No active pregnancy on record    No positive pregnancy test in last 12 months    Recent (6 mo) or future (30 days) visit within the authorizing provider's specialty          buPROPion (WELLBUTRIN XL) 150 MG 24 hr tablet   Last Written Prescription Date:  7/13/2023  Last Fill Quantity: 90,   # refills: 1  Last Office Visit : 12/14/2023  Future Office visit:  None  90 Tabs, 1 Refill sent to pharm for Pt care.     SSRIs Protocol [id number]/17/2024 08:29 AM   Protocol Details PHQ-9 score less than 5 in past 6 months    Medication is Bupropion    Medication is active on med list    Patient is age 18 or older    No active pregnancy on record    No positive pregnancy test in last 12 months    Recent (6 mo) or future (30 days) visit within the authorizing provider's specialty       To be filled at: [facility] [address] [place] [address]

## 2024-01-20 RX ORDER — BUPROPION HYDROCHLORIDE 150 MG/1
150 TABLET ORAL EVERY MORNING
Qty: 90 TABLET | Refills: 0 | OUTPATIENT
Start: 2024-01-20

## 2024-01-20 RX ORDER — CITALOPRAM HYDROBROMIDE 40 MG/1
40 TABLET ORAL DAILY
Qty: 90 TABLET | Refills: 0 | OUTPATIENT
Start: 2024-01-20

## 2024-01-21 NOTE — TELEPHONE ENCOUNTER
citalopram (CELEXA) 40 MG tablet 90 tablet 1 1/17/2024     buPROPion (WELLBUTRIN XL) 150 MG 24 hr tablet 90 tablet 1 1/17/2024     Should have refills on file. Pharmacy sent message. Rx refill denied    Shannen Neal RN  P Red Flag Triage/MRT

## 2024-02-12 ENCOUNTER — MYC MEDICAL ADVICE (OUTPATIENT)
Dept: INTERNAL MEDICINE | Facility: CLINIC | Age: 69
End: 2024-02-12
Payer: MEDICARE

## 2024-02-20 ENCOUNTER — OFFICE VISIT (OUTPATIENT)
Dept: INTERNAL MEDICINE | Facility: CLINIC | Age: 69
End: 2024-02-20
Payer: MEDICARE

## 2024-02-20 ENCOUNTER — ANCILLARY PROCEDURE (OUTPATIENT)
Dept: GENERAL RADIOLOGY | Facility: CLINIC | Age: 69
End: 2024-02-20
Payer: MEDICARE

## 2024-02-20 ENCOUNTER — LAB (OUTPATIENT)
Dept: LAB | Facility: CLINIC | Age: 69
End: 2024-02-20
Payer: MEDICARE

## 2024-02-20 VITALS
OXYGEN SATURATION: 95 % | SYSTOLIC BLOOD PRESSURE: 136 MMHG | HEART RATE: 79 BPM | WEIGHT: 136.2 LBS | BODY MASS INDEX: 22.66 KG/M2 | DIASTOLIC BLOOD PRESSURE: 87 MMHG

## 2024-02-20 DIAGNOSIS — M25.531 PAIN IN RIGHT WRIST: ICD-10-CM

## 2024-02-20 DIAGNOSIS — R22.9 LOCALIZED SUPERFICIAL SWELLING, MASS, OR LUMP: ICD-10-CM

## 2024-02-20 DIAGNOSIS — R00.2 PALPITATIONS: Primary | ICD-10-CM

## 2024-02-20 DIAGNOSIS — R00.2 PALPITATIONS: ICD-10-CM

## 2024-02-20 LAB
ALBUMIN SERPL BCG-MCNC: 4.2 G/DL (ref 3.5–5.2)
ALP SERPL-CCNC: 84 U/L (ref 40–150)
ALT SERPL W P-5'-P-CCNC: 62 U/L (ref 0–50)
ANION GAP SERPL CALCULATED.3IONS-SCNC: 8 MMOL/L (ref 7–15)
AST SERPL W P-5'-P-CCNC: 54 U/L (ref 0–45)
ATRIAL RATE - MUSE: 72 BPM
BILIRUB SERPL-MCNC: 0.4 MG/DL
BUN SERPL-MCNC: 23.3 MG/DL (ref 8–23)
CALCIUM SERPL-MCNC: 9.4 MG/DL (ref 8.8–10.2)
CHLORIDE SERPL-SCNC: 106 MMOL/L (ref 98–107)
CREAT SERPL-MCNC: 0.85 MG/DL (ref 0.51–0.95)
DEPRECATED HCO3 PLAS-SCNC: 26 MMOL/L (ref 22–29)
DIASTOLIC BLOOD PRESSURE - MUSE: NORMAL MMHG
EGFRCR SERPLBLD CKD-EPI 2021: 74 ML/MIN/1.73M2
ERYTHROCYTE [DISTWIDTH] IN BLOOD BY AUTOMATED COUNT: 14.6 % (ref 10–15)
GLUCOSE SERPL-MCNC: 106 MG/DL (ref 70–99)
HCT VFR BLD AUTO: 39.7 % (ref 35–47)
HGB BLD-MCNC: 13 G/DL (ref 11.7–15.7)
INTERPRETATION ECG - MUSE: NORMAL
MCH RBC QN AUTO: 30.8 PG (ref 26.5–33)
MCHC RBC AUTO-ENTMCNC: 32.7 G/DL (ref 31.5–36.5)
MCV RBC AUTO: 94 FL (ref 78–100)
P AXIS - MUSE: 32 DEGREES
PLATELET # BLD AUTO: 244 10E3/UL (ref 150–450)
POTASSIUM SERPL-SCNC: 4.3 MMOL/L (ref 3.4–5.3)
PR INTERVAL - MUSE: 150 MS
PROT SERPL-MCNC: 6.7 G/DL (ref 6.4–8.3)
QRS DURATION - MUSE: 84 MS
QT - MUSE: 372 MS
QTC - MUSE: 407 MS
R AXIS - MUSE: 9 DEGREES
RBC # BLD AUTO: 4.22 10E6/UL (ref 3.8–5.2)
SODIUM SERPL-SCNC: 140 MMOL/L (ref 135–145)
SYSTOLIC BLOOD PRESSURE - MUSE: NORMAL MMHG
T AXIS - MUSE: -8 DEGREES
VENTRICULAR RATE- MUSE: 72 BPM
WBC # BLD AUTO: 5.7 10E3/UL (ref 4–11)

## 2024-02-20 PROCEDURE — 80053 COMPREHEN METABOLIC PANEL: CPT | Performed by: PATHOLOGY

## 2024-02-20 PROCEDURE — 73110 X-RAY EXAM OF WRIST: CPT | Mod: RT | Performed by: RADIOLOGY

## 2024-02-20 PROCEDURE — 99214 OFFICE O/P EST MOD 30 MIN: CPT | Mod: 25

## 2024-02-20 PROCEDURE — 85027 COMPLETE CBC AUTOMATED: CPT | Performed by: PATHOLOGY

## 2024-02-20 PROCEDURE — 36415 COLL VENOUS BLD VENIPUNCTURE: CPT | Performed by: PATHOLOGY

## 2024-02-20 PROCEDURE — 93005 ELECTROCARDIOGRAM TRACING: CPT

## 2024-02-20 PROCEDURE — 84443 ASSAY THYROID STIM HORMONE: CPT | Performed by: PATHOLOGY

## 2024-02-20 RX ORDER — RESPIRATORY SYNCYTIAL VIRUS VACCINE 120MCG/0.5
0.5 KIT INTRAMUSCULAR ONCE
Qty: 1 EACH | Refills: 0 | Status: CANCELLED | OUTPATIENT
Start: 2024-02-20 | End: 2024-02-20

## 2024-02-20 NOTE — PROGRESS NOTES
Elisha is a 68 year old that presents in clinic today for the following:     Chief Complaint   Patient presents with    Follow Up     Swelling on right hand onset about a month ago, looking better than before per pt. Some heart palpitations per pt, onset about a week ago, last two days have been better, three days ago it was worse.            2/20/2024     1:57 PM   Additional Questions   Roomed by MR     Screenings from encounters over the past 10 days    No data recorded       Laurita Leigh, EMT at 2:00 PM on 2/20/2024

## 2024-02-20 NOTE — Clinical Note
Hi Dr. Bravo, reminder to please order labs/imaging with authorizing faculty provider so we get copied on all results, as you may not be checking epic when results come in. Thanks! ML

## 2024-02-21 LAB — TSH SERPL DL<=0.005 MIU/L-ACNC: 1.73 UIU/ML (ref 0.3–4.2)

## 2024-02-21 NOTE — PROGRESS NOTES
PRIMARY CARE CENTER           HPI: Elisha Gao is a 68 year old female with hx of grade 2 invasive ductal carcinoma requiring right breast lumpectomy and right axillary sentinel lymph node procedure, osteopenia  who presented with concern for swelling on her R hand.     She reports onset of swelling on her R hand for approximately a week that was worse 3 days ago but has been getting better recently. There is no concern for compromise of her hand movement or associated neurological compromise. There is no concern for localized skin changes or drainage from the site of her swelling. No concern for pain at the site of swelling or referred pain in the fingers of her hand. No worsening of swelling with position of the hand or the time of the day.     She also reports that recently she had bursitis of her hip that has caused significant pain and she has not been able to sleep well in the night. Reports that she has been sleeping for maximum 2 hours at a stretch in the night and there have been associated palpitation. No concern for chest pain, SOB, dizziness, visual disturbances or constipation.    Patient denies any LE edema, exertional dyspnea/orthopnea/PND, dizziness, lightheadedness, nausea, vomiting or diarrhea.     ROS:  10 point ROS neg other than the symptoms noted above in the HPI.       Past Medical History:   Diagnosis Date    Breast cancer (H) 03/2022    Chronic low back pain     for a couple years, worse at night, not evaluated    Depression     Essential tremor     Invasive ductal carcinoma of breast, female, right (H)     osteopenia     FRAX  11/2%   2022     Past Surgical History:   Procedure Laterality Date    ABDOMEN SURGERY  1987    BREAST SURGERY      COLONOSCOPY  2020    GYN SURGERY  1987    HYSTERECTOMY  1987    endometriosis, ovarian cysts, hormones x 10  years    LASIK Bilateral     LUMPECTOMY, BREAST, LOCALIZED USING RADIOFREQUENCY IDENTIFICATION Right 04/11/2022    Procedure: LUMPECTOMY,  BREAST, LOCALIZED USING RADIOFREQUENCY IDENTIFICATION, sentinel lymph node biopsy;  Surgeon: Santy Garcia MD;  Location: [facility] OR     Family History   Problem Relation Age of Onset    Colon Cancer Mother         75    Lung Cancer Mother         90    Macular Degeneration Mother     Dementia Mother     Hypertension Mother     Osteoporosis Mother     Heart Disease Father         62    Coronary Artery Disease Father     Tremor Brother     Bipolar Disorder Brother     Tremor Brother     Glaucoma No family hx of     Anesthesia Reaction No family hx of     Thrombosis No family hx of     Parkinsonism No family hx of      Social History     Tobacco Use    Smoking status: Former     Packs/day: 0.50     Years: 15.00     Additional pack years: 0.00     Total pack years: 7.50     Types: Cigarettes     Start date: 1972     Quit date: 1987     Years since quittin.6    Smokeless tobacco: Never   Vaping Use    Vaping Use: Never used   Substance Use Topics    Alcohol use: Yes     Alcohol/week: 3.0 standard drinks of alcohol     Types: 3 Glasses of wine per week     Comment: Social    Drug use: Never     Current Outpatient Medications   Medication Sig Dispense Refill    anastrozole (ARIMIDEX) 1 MG tablet Take 1 tablet (1 mg) by mouth daily 90 tablet 3    buPROPion (WELLBUTRIN XL) 150 MG 24 hr tablet Take 1 tablet (150 mg) by mouth every morning 90 tablet 1    citalopram (CELEXA) 40 MG tablet Take 1 tablet (40 mg) by mouth daily 90 tablet 1    magnesium 250 MG tablet Take 1 tablet by mouth every morning       ondansetron (ZOFRAN ODT) 4 MG ODT tab Take 1 tablet (4 mg) by mouth every 8 hours as needed for nausea 18 tablet 0    simvastatin (ZOCOR) 20 MG tablet Take 1 tablet (20 mg) by mouth At Bedtime 90 tablet 3    Vitamin D, Cholecalciferol, 25 MCG (1000 UT) TABS Take 3 tablets by mouth every morning       cephALEXin (KEFLEX) 500 MG capsule Take 1 capsule (500 mg) by mouth 3 times daily 21 capsule 0        Allergies    Allergen Reactions    Alendronate Nausea and Vomiting    Raloxifene Nausea    Risedronate Nausea and Vomiting         /87 (BP Location: Left arm, Patient Position: Sitting, Cuff Size: Adult Regular)   Pulse 79   Wt 61.8 kg (136 lb 3.2 oz)   SpO2 95%   BMI 22.66 kg/m      Physical Examination:  General:  Conversant, generally healthy appearing, no acute distress  Lungs:  Clear to auscultation throughout, no wheezes, rhonchi or rale  C/V:  Regular rate and rhythm, no murmurs, rubs or gallops  Abdomen:  Not distended.  Bowel sounds active  Lymph:  No cervical or axillary lymph nodes.  Neuro: Alert and oriented, face symmetric  M/S:   No joint deformities noted.  No joint swelling.  Skin:   Normal temperature., turgor and texture   Extremities:  No peripheral edema, no digital cyanosis, swelling on dorsum of R hand with no extension to the RUE      Assessment and Plan:  Elisha was seen today for follow up.    Diagnoses and all orders for this visit:    #Palpitations  Her symptoms seem more likely in the setting of her recent sleep disturbances and pain from her hip. EKG did not show any concern for rhythm disorder. She has been undergoing treatment with anastrozole since 6/22 and has hx of radiotherapy to the chest. It would be reasonable to establish a baseline screening with an echocardiogram  -     EKG 12-lead complete w/read - Clinics  -     Echocardiogram Complete; Future  -     Comprehensive metabolic panel (BMP + Alb, Alk Phos, ALT, AST, Total. Bili, TP); Future  -     CBC with platelets; Future    #Localized superficial swelling, mass, or lump  No concern for infection or lymphedema currently. Seems more likely to be cystic with concern for ganglionic cyst. Will investigate further with Xray and US.  Of note, anastrazole may also cause thrombophlebitis though currently exam more c/w cyst.  -     US Upper Extremity Non Vascular Right; Future  -     XR Wrist Right G/E 3 Views; Future    Options for  treatment and follow-up care were reviewed with the patient. Elisha GARCIA Murray engaged in the decision making process and verbalized understanding of the options discussed and agreed with the final plan.  This patient was discussed and seen with Dr. Pizarro    Feb 20, 2024  RAS Ny  Internal Medicine Resident (PGY2)  [facility]  Pager: [phone]    Attending Addendum:  Patient seen and examined with resident in clinic today.  Pertinent portions of history and exam were independently verified by myself.  I agree with the exam and plan as outlined above with the following modifications: none.  Debi Pizarro MD  Internal Medicine

## 2024-02-26 ENCOUNTER — ANCILLARY PROCEDURE (OUTPATIENT)
Dept: ULTRASOUND IMAGING | Facility: CLINIC | Age: 69
End: 2024-02-26
Payer: MEDICARE

## 2024-02-26 DIAGNOSIS — M25.531 PAIN IN RIGHT WRIST: ICD-10-CM

## 2024-02-26 DIAGNOSIS — R22.9 LOCALIZED SUPERFICIAL SWELLING, MASS, OR LUMP: ICD-10-CM

## 2024-02-26 PROCEDURE — 76882 US LMTD JT/FCL EVL NVASC XTR: CPT | Mod: RT | Performed by: RADIOLOGY

## 2024-03-05 ENCOUNTER — ANCILLARY PROCEDURE (OUTPATIENT)
Dept: CARDIOLOGY | Facility: CLINIC | Age: 69
End: 2024-03-05
Payer: MEDICARE

## 2024-03-05 DIAGNOSIS — R00.2 PALPITATIONS: ICD-10-CM

## 2024-03-05 LAB — LVEF ECHO: NORMAL

## 2024-03-05 PROCEDURE — 93306 TTE W/DOPPLER COMPLETE: CPT | Performed by: INTERNAL MEDICINE

## 2024-03-17 NOTE — PROGRESS NOTES
Oncology Visit:  Date on this visit: Mar 18, 2024    Diagnosis: right breast cancer.    Primary Physician: Debi Pizarro     History Of Present Illness:  Ms. Gao is a 68 year old female with right breast cancer.  Routine screening mammogram on 3/17/2022 showed an asymmetry in the upper outer right breast.  Diagnostic breast imaging confirmed a 4 mm spiculated mass at 11:00, 4 cm from the nipple of the right breast.  Biopsy was consistent with a grade 2 invasive ductal carcinoma, ER strong in 98%, [place] moderate in 40%, and HER2 negative by FISH and atypical ductal hyperplasia.  Patient underwent right breast lumpectomy and right axillary sentinel lymph node biopsy under the care of Dr. Garcia on 4/11/2022.  Pathology showed a grade 2 invasive ductal carcinoma measuring 9 mm with associated low grade DCIS.  Surgical margins were negative.  There was no lymphovascular invasion and two sentinel lymph nodes were benign.  She completed radiation, 2600 cGy in 5 fractions, to the right breast on 5/25/2022.  She has been on anastrozole since 6/15/2022.    Interval History:   Elisha presents by herself today for follow up. Overall she is doing well. She has had chronic right hip pain for which she recently had a steroid injection which improved her pain significantly. She recently had some right hand swelling and US demonstrated tenosynovitis. She plans on following up with this with her primary/rheumatology. She otherwise has no new concerns. No new lumps, bumps, swelling, focal pain, fatigue, weight loss, cough, SOB, abdominal pain, or changes in bowels.       Past Medical/Surgical History:  Past Medical History:   Diagnosis Date    Breast cancer (H) 03/2022    Chronic low back pain     for a couple years, worse at night, not evaluated    Depression     Essential tremor     Invasive ductal carcinoma of breast, female, right (H)     osteopenia     FRAX  11/2%   2022   Colonoscopy in 2012 w/o polyps, next due in 2022.    Past  Surgical History:   Procedure Laterality Date    ABDOMEN SURGERY  1987    BREAST SURGERY      COLONOSCOPY  2020    GYN SURGERY  1987    HYSTERECTOMY  1987    endometriosis, ovarian cysts, hormones x 10  years    LASIK Bilateral     LUMPECTOMY, BREAST, LOCALIZED USING RADIOFREQUENCY IDENTIFICATION Right 04/11/2022    Procedure: LUMPECTOMY, BREAST, LOCALIZED USING RADIOFREQUENCY IDENTIFICATION, sentinel lymph node biopsy;  Surgeon: Santy Garcia MD;  Location: [facility] OR     Allergies:  Allergies as of 03/18/2024 - Reviewed 03/18/2024   Allergen Reaction Noted    Alendronate Nausea and Vomiting 05/26/2010    Raloxifene Nausea 05/26/2010    Risedronate Nausea and Vomiting 05/26/2010     Current Medications:  Current Outpatient Medications   Medication Sig Dispense Refill    anastrozole (ARIMIDEX) 1 MG tablet Take 1 tablet (1 mg) by mouth daily 90 tablet 3    buPROPion (WELLBUTRIN XL) 150 MG 24 hr tablet Take 1 tablet (150 mg) by mouth every morning 90 tablet 1    cephALEXin (KEFLEX) 500 MG capsule Take 1 capsule (500 mg) by mouth 3 times daily 21 capsule 0    citalopram (CELEXA) 40 MG tablet Take 1 tablet (40 mg) by mouth daily 90 tablet 1    magnesium 250 MG tablet Take 1 tablet by mouth every morning       ondansetron (ZOFRAN ODT) 4 MG ODT tab Take 1 tablet (4 mg) by mouth every 8 hours as needed for nausea 18 tablet 0    simvastatin (ZOCOR) 20 MG tablet Take 1 tablet (20 mg) by mouth At Bedtime 90 tablet 3    Vitamin D, Cholecalciferol, 25 MCG (1000 UT) TABS Take 3 tablets by mouth every morning         Family and Social History:  See initial consultation dated 4/4/2022 for further details.  Breast Actionable panel on 4/4/2022 was negative for germline genetic mutation.    Physical Exam:  BP (!) 148/93 (BP Location: Right arm, Patient Position: Sitting, Cuff Size: Adult Regular)   Pulse 65   Temp 98.4  F (36.9  C) (Oral)   Resp 16   Wt 60.5 kg (133 lb 4.8 oz)   SpO2 98%   BMI 22.18 kg/m    General:  Well  appearing adult female in NAD.  Alert and oriented x 3.  HEENT:  Normocephalic.  Sclera anicteric.    Lymph:  No palpable cervical, supraclavicular, or axillary LAD.  Chest:  CTAB, normal work of breathing   Breast:  Right axillary and right upper outer breast incision are well healed. There is some fibrosis along the upper outer lumpectomy site unchanged from prior. There are no discretely palpable masses in either breast.  Bilateral nipples are everted.   Abd:  Soft/ND/NT +BS   Ext:  No edema of the bilateral lower extremities.   Neuro:  Cranial nerves grossly intact.  Gait stable.  Psych:  Mood and affect appear normal.  Skin:  No visible concerning skin rashes or lesions.    Laboratory/Imaging Studies   03/18/24 08:40   Creatinine 1.02 (H)   GFR Estimate 59 (L)   Calcium 9.7   Albumin 4.2       ASSESSMENT/PLAN:  69 yo female with stage Ia, [id number], grade 2, ER/UT positive, HER-2 negative invasive ductal carcinoma of the right breast.   She is s/p right breast lumpectomy and right axillary sentinel lymph node procedure.    1.  Right breast cancer:  Ms. Gao is 1 year, 11 months out from excision of a right breast cancer.  She is on treatment with anastrozole. She is tolerating well. Plan is to treat with a total 5 years of endocrine therapy.    There are no concerning signs or symptoms of recurrence today. She will call with any interval concerns otherwise will follow-up in about 3 months. Next mammogram due 12/2024.     2.  Osteopenia:  DEXA bone density scan 3/17/2022 with a lowest T-score of -2.0 and is consistent with osteopenia.  Zometa 4 mg IV once every 6 months for 3 years, to prevent bone loss on aromatase inhibitor therapy and also reduce the risk of breast cancer bone metastasis was started on 9/14/2022.    - C4 Zometa given after visit today   - DEXA is due. Will order and request to be done this spring.       JAMI Ruelas    The patient was seen in conjunction with JAMI Ruelas who served  as a scribe for today's visit. I have reviewed and edited the above note, and agree with the above findings and plan.    Caprice Santos PA-C

## 2024-03-18 ENCOUNTER — ONCOLOGY VISIT (OUTPATIENT)
Dept: ONCOLOGY | Facility: CLINIC | Age: 69
End: 2024-03-18
Attending: INTERNAL MEDICINE
Payer: MEDICARE

## 2024-03-18 ENCOUNTER — APPOINTMENT (OUTPATIENT)
Dept: LAB | Facility: CLINIC | Age: 69
End: 2024-03-18
Attending: INTERNAL MEDICINE
Payer: MEDICARE

## 2024-03-18 VITALS
RESPIRATION RATE: 16 BRPM | SYSTOLIC BLOOD PRESSURE: 148 MMHG | BODY MASS INDEX: 22.18 KG/M2 | WEIGHT: 133.3 LBS | OXYGEN SATURATION: 98 % | TEMPERATURE: 98.4 F | DIASTOLIC BLOOD PRESSURE: 93 MMHG | HEART RATE: 65 BPM

## 2024-03-18 DIAGNOSIS — C50.411 MALIGNANT NEOPLASM OF UPPER-OUTER QUADRANT OF RIGHT BREAST IN FEMALE, ESTROGEN RECEPTOR POSITIVE (H): Primary | ICD-10-CM

## 2024-03-18 DIAGNOSIS — Z17.0 MALIGNANT NEOPLASM OF UPPER-OUTER QUADRANT OF RIGHT BREAST IN FEMALE, ESTROGEN RECEPTOR POSITIVE (H): Primary | ICD-10-CM

## 2024-03-18 DIAGNOSIS — Z79.811 LONG TERM (CURRENT) USE OF AROMATASE INHIBITORS: ICD-10-CM

## 2024-03-18 LAB
ALBUMIN SERPL BCG-MCNC: 4.2 G/DL (ref 3.5–5.2)
CALCIUM SERPL-MCNC: 9.7 MG/DL (ref 8.8–10.2)
CREAT SERPL-MCNC: 1.02 MG/DL (ref 0.51–0.95)
EGFRCR SERPLBLD CKD-EPI 2021: 59 ML/MIN/1.73M2

## 2024-03-18 PROCEDURE — 96365 THER/PROPH/DIAG IV INF INIT: CPT

## 2024-03-18 PROCEDURE — 82040 ASSAY OF SERUM ALBUMIN: CPT | Performed by: INTERNAL MEDICINE

## 2024-03-18 PROCEDURE — 36415 COLL VENOUS BLD VENIPUNCTURE: CPT | Performed by: INTERNAL MEDICINE

## 2024-03-18 PROCEDURE — 82310 ASSAY OF CALCIUM: CPT | Performed by: INTERNAL MEDICINE

## 2024-03-18 PROCEDURE — 82565 ASSAY OF CREATININE: CPT | Performed by: INTERNAL MEDICINE

## 2024-03-18 PROCEDURE — 250N000011 HC RX IP 250 OP 636: Mod: JZ | Performed by: INTERNAL MEDICINE

## 2024-03-18 PROCEDURE — G0463 HOSPITAL OUTPT CLINIC VISIT: HCPCS | Performed by: PHYSICIAN ASSISTANT

## 2024-03-18 PROCEDURE — 99214 OFFICE O/P EST MOD 30 MIN: CPT | Performed by: PHYSICIAN ASSISTANT

## 2024-03-18 RX ORDER — ZOLEDRONIC ACID 0.04 MG/ML
4 INJECTION, SOLUTION INTRAVENOUS ONCE
Status: COMPLETED | OUTPATIENT
Start: 2024-03-18 | End: 2024-03-18

## 2024-03-18 RX ADMIN — ZOLEDRONIC ACID 4 MG: 0.04 INJECTION, SOLUTION INTRAVENOUS at 09:55

## 2024-03-18 ASSESSMENT — PAIN SCALES - GENERAL: PAINLEVEL: NO PAIN (0)

## 2024-03-18 NOTE — NURSING NOTE
"Oncology Rooming Note    March 18, 2024 8:55 AM   Elisha Gao is a 68 year old female who presents for:    Chief Complaint   Patient presents with    Blood Draw     Labs drawn via piv placed by RN in lab. VS taken.     Oncology Clinic Visit     Malignant neoplasm of upper-outer quadrant of right breast in female, estrogen receptor positive     Initial Vitals: BP (!) 148/93 (BP Location: Right arm, Patient Position: Sitting, Cuff Size: Adult Regular)   Pulse 65   Temp 98.4  F (36.9  C) (Oral)   Resp 16   Wt 60.5 kg (133 lb 4.8 oz)   SpO2 98%   BMI 22.18 kg/m   Estimated body mass index is 22.18 kg/m  as calculated from the following:    Height as of 12/14/23: 1.651 m (5' 5\").    Weight as of this encounter: 60.5 kg (133 lb 4.8 oz). Body surface area is 1.67 meters squared.  No Pain (0) Comment: Data Unavailable   No LMP recorded. Patient is postmenopausal.  Allergies reviewed: Yes  Medications reviewed: Yes    Medications: Medication refills not needed today.  Pharmacy name entered into [employer]:    [id number] - [place], [place] - [address] [place] [address] PHARMACY [place], [place] - [address] [place] [address] [id number]  [place], [place] - [address] [place] [address]    Frailty Screening:   Is the patient here for a new oncology consult visit in cancer care? 2. No      Clinical concerns: none       Pennie Samayoa              "

## 2024-03-18 NOTE — NURSING NOTE
Chief Complaint   Patient presents with    Blood Draw     Labs drawn via piv placed by RN in lab. VS taken.      Labs drawn from PIV placed by RN. Line flushed with saline. Vital signs taken. Pt checked in for appointment(s).    Sylwia Hayes RN

## 2024-03-18 NOTE — LETTER
3/18/2024         RE: Elisha Gao  [address] [id number]        Dear Colleague,    Thank you for referring your patient, Elisha Gao, to the [facility]. Please see a copy of my visit note below.    Oncology Visit:  Date on this visit: Mar 18, 2024    Diagnosis: right breast cancer.    Primary Physician: Debi Pizarro     History Of Present Illness:  Ms. Gao is a 68 year old female with right breast cancer.  Routine screening mammogram on 3/17/2022 showed an asymmetry in the upper outer right breast.  Diagnostic breast imaging confirmed a 4 mm spiculated mass at 11:00, 4 cm from the nipple of the right breast.  Biopsy was consistent with a grade 2 invasive ductal carcinoma, ER strong in 98%, [place] moderate in 40%, and HER2 negative by FISH and atypical ductal hyperplasia.  Patient underwent right breast lumpectomy and right axillary sentinel lymph node biopsy under the care of Dr. Garcia on 4/11/2022.  Pathology showed a grade 2 invasive ductal carcinoma measuring 9 mm with associated low grade DCIS.  Surgical margins were negative.  There was no lymphovascular invasion and two sentinel lymph nodes were benign.  She completed radiation, 2600 cGy in 5 fractions, to the right breast on 5/25/2022.  She has been on anastrozole since 6/15/2022.    Interval History:   Elisha presents by herself today for follow up. Overall she is doing well. She has had chronic right hip pain for which she recently had a steroid injection which improved her pain significantly. She recently had some right hand swelling and US demonstrated tenosynovitis. She plans on following up with this with her primary/rheumatology. She otherwise has no new concerns. No new lumps, bumps, swelling, focal pain, fatigue, weight loss, cough, SOB, abdominal pain, or changes in bowels.       Past Medical/Surgical History:  Past Medical History:   Diagnosis Date    Breast cancer (H) 03/2022    Chronic  low back pain     for a couple years, worse at night, not evaluated    Depression     Essential tremor     Invasive ductal carcinoma of breast, female, right (H)     osteopenia     FRAX  11/2%   2022   Colonoscopy in 2012 w/o polyps, next due in 2022.    Past Surgical History:   Procedure Laterality Date    ABDOMEN SURGERY  1987    BREAST SURGERY      COLONOSCOPY  2020    GYN SURGERY  1987    HYSTERECTOMY  1987    endometriosis, ovarian cysts, hormones x 10  years    LASIK Bilateral     LUMPECTOMY, BREAST, LOCALIZED USING RADIOFREQUENCY IDENTIFICATION Right 04/11/2022    Procedure: LUMPECTOMY, BREAST, LOCALIZED USING RADIOFREQUENCY IDENTIFICATION, sentinel lymph node biopsy;  Surgeon: Santy Garcia MD;  Location: [facility] OR     Allergies:  Allergies as of 03/18/2024 - Reviewed 03/18/2024   Allergen Reaction Noted    Alendronate Nausea and Vomiting 05/26/2010    Raloxifene Nausea 05/26/2010    Risedronate Nausea and Vomiting 05/26/2010     Current Medications:  Current Outpatient Medications   Medication Sig Dispense Refill    anastrozole (ARIMIDEX) 1 MG tablet Take 1 tablet (1 mg) by mouth daily 90 tablet 3    buPROPion (WELLBUTRIN XL) 150 MG 24 hr tablet Take 1 tablet (150 mg) by mouth every morning 90 tablet 1    cephALEXin (KEFLEX) 500 MG capsule Take 1 capsule (500 mg) by mouth 3 times daily 21 capsule 0    citalopram (CELEXA) 40 MG tablet Take 1 tablet (40 mg) by mouth daily 90 tablet 1    magnesium 250 MG tablet Take 1 tablet by mouth every morning       ondansetron (ZOFRAN ODT) 4 MG ODT tab Take 1 tablet (4 mg) by mouth every 8 hours as needed for nausea 18 tablet 0    simvastatin (ZOCOR) 20 MG tablet Take 1 tablet (20 mg) by mouth At Bedtime 90 tablet 3    Vitamin D, Cholecalciferol, 25 MCG (1000 UT) TABS Take 3 tablets by mouth every morning         Family and Social History:  See initial consultation dated 4/4/2022 for further details.  Breast Actionable panel on 4/4/2022 was negative for germline genetic  mutation.    Physical Exam:  BP (!) 148/93 (BP Location: Right arm, Patient Position: Sitting, Cuff Size: Adult Regular)   Pulse 65   Temp 98.4  F (36.9  C) (Oral)   Resp 16   Wt 60.5 kg (133 lb 4.8 oz)   SpO2 98%   BMI 22.18 kg/m    General:  Well appearing adult female in NAD.  Alert and oriented x 3.  HEENT:  Normocephalic.  Sclera anicteric.    Lymph:  No palpable cervical, supraclavicular, or axillary LAD.  Chest:  CTAB, normal work of breathing   Breast:  Right axillary and right upper outer breast incision are well healed. There is some fibrosis along the upper outer lumpectomy site unchanged from prior. There are no discretely palpable masses in either breast.  Bilateral nipples are everted.   Abd:  Soft/ND/NT +BS   Ext:  No edema of the bilateral lower extremities.   Neuro:  Cranial nerves grossly intact.  Gait stable.  Psych:  Mood and affect appear normal.  Skin:  No visible concerning skin rashes or lesions.    Laboratory/Imaging Studies   03/18/24 08:40   Creatinine 1.02 (H)   GFR Estimate 59 (L)   Calcium 9.7   Albumin 4.2       ASSESSMENT/PLAN:  69 yo female with stage Ia, [id number], grade 2, ER/ME positive, HER-2 negative invasive ductal carcinoma of the right breast.   She is s/p right breast lumpectomy and right axillary sentinel lymph node procedure.    1.  Right breast cancer:  Ms. Gao is 1 year, 11 months out from excision of a right breast cancer.  She is on treatment with anastrozole. She is tolerating well. Plan is to treat with a total 5 years of endocrine therapy.    There are no concerning signs or symptoms of recurrence today. She will call with any interval concerns otherwise will follow-up in about 3 months. Next mammogram due 12/2024.     2.  Osteopenia:  DEXA bone density scan 3/17/2022 with a lowest T-score of -2.0 and is consistent with osteopenia.  Zometa 4 mg IV once every 6 months for 3 years, to prevent bone loss on aromatase inhibitor therapy and also reduce the risk of  breast cancer bone metastasis was started on 9/14/2022.    - C4 Zometa given after visit today   - DEXA is due. Will order and request to be done this spring.       JAMI Ruelas    The patient was seen in conjunction with JAMI Ruelas who served as a scribe for today's visit. I have reviewed and edited the above note, and agree with the above findings and plan.    Caprice Santos PA-C

## 2024-03-18 NOTE — PROGRESS NOTES
Infusion Nursing Note:  Elisha Gao presents today for Zometa Infusion.    Patient seen and examined by Caprice Santos in clinic prior to infusion.    Note: Pt states she has been taking her calcium and vitamin D as ordered.      Intravenous Access:  Peripheral IV placed in lab    Treatment Conditions:  Component      Latest Ref Rng 3/18/2024  8:40 AM   Creatinine      0.51 - 0.95 mg/dL 1.02 (H)    GFR Estimate      >60 mL/min/1.73m2 59 (L)    Calcium      8.8 - 10.2 mg/dL 9.7    Albumin      3.5 - 5.2 g/dL 4.2         Results reviewed, labs MET treatment parameters, ok to proceed with treatment.      Post Infusion Assessment:  Patient tolerated infusion without incident.       Discharge Plan:     AVS to patient via [employer].  Patient will return 7/15/24 to see Dr Kaplan in clinic.  Patient discharged in stable condition accompanied by: self.  Departure Mode: Ambulatory.      Carie Rowe RN

## 2024-03-25 ENCOUNTER — MYC MEDICAL ADVICE (OUTPATIENT)
Dept: INTERNAL MEDICINE | Facility: CLINIC | Age: 69
End: 2024-03-25
Payer: MEDICARE

## 2024-05-06 ENCOUNTER — ANCILLARY PROCEDURE (OUTPATIENT)
Dept: BONE DENSITY | Facility: CLINIC | Age: 69
End: 2024-05-06
Attending: PHYSICIAN ASSISTANT
Payer: MEDICARE

## 2024-05-06 DIAGNOSIS — Z79.811 LONG TERM (CURRENT) USE OF AROMATASE INHIBITORS: ICD-10-CM

## 2024-05-06 DIAGNOSIS — C50.411 MALIGNANT NEOPLASM OF UPPER-OUTER QUADRANT OF RIGHT BREAST IN FEMALE, ESTROGEN RECEPTOR POSITIVE (H): ICD-10-CM

## 2024-05-06 DIAGNOSIS — Z17.0 MALIGNANT NEOPLASM OF UPPER-OUTER QUADRANT OF RIGHT BREAST IN FEMALE, ESTROGEN RECEPTOR POSITIVE (H): ICD-10-CM

## 2024-05-06 PROCEDURE — 77080 DXA BONE DENSITY AXIAL: CPT | Performed by: INTERNAL MEDICINE

## 2024-05-23 ENCOUNTER — OFFICE VISIT (OUTPATIENT)
Dept: INTERNAL MEDICINE | Facility: CLINIC | Age: 69
End: 2024-05-23
Payer: MEDICARE

## 2024-05-23 VITALS
WEIGHT: 136.7 LBS | BODY MASS INDEX: 22.75 KG/M2 | DIASTOLIC BLOOD PRESSURE: 81 MMHG | HEART RATE: 57 BPM | OXYGEN SATURATION: 96 % | SYSTOLIC BLOOD PRESSURE: 125 MMHG

## 2024-05-23 DIAGNOSIS — Z11.59 NEED FOR HEPATITIS C SCREENING TEST: Primary | ICD-10-CM

## 2024-05-23 DIAGNOSIS — M25.431 WRIST SWELLING, RIGHT: ICD-10-CM

## 2024-05-23 DIAGNOSIS — M65.949 TENOSYNOVITIS OF HAND: ICD-10-CM

## 2024-05-23 DIAGNOSIS — Z29.11 NEED FOR VACCINATION AGAINST RESPIRATORY SYNCYTIAL VIRUS: ICD-10-CM

## 2024-05-23 PROCEDURE — 99214 OFFICE O/P EST MOD 30 MIN: CPT | Performed by: INTERNAL MEDICINE

## 2024-05-23 RX ORDER — RESPIRATORY SYNCYTIAL VIRUS VACCINE 120MCG/0.5
0.5 KIT INTRAMUSCULAR ONCE
Qty: 1 EACH | Refills: 0 | Status: CANCELLED | OUTPATIENT
Start: 2024-05-23 | End: 2024-05-23

## 2024-05-23 NOTE — PROGRESS NOTES
Assessment & Plan       Wrist swelling, right  No clinical history of s/s on exam of inflammatory systemic OA, crystalline arthropathy, infection/trauma. Discussed getting labs but low suspicion for IA so will defer for now. Will proceed with MRI for further categorization, likely needs follow-up with ortho.  - Orthopedic  Referral; Future  - MR Wrist Right w/o & w Contrast; Future    Tenosynovitis of hand  - Orthopedic  Referral; Future  - MR Wrist Right w/o & w Contrast; Future                No follow-ups on file.    Alonso Tello is a 69 year old, presenting for the following health issues:  Swelling (Pt reports swelling in right arm that has persisted over the last 4 months and another Dr. Has recommended an mri to evaluate further)      5/23/2024     8:21 AM   Additional Questions   Roomed by CHRISTOPHER, EMT     History of Present Illness       Reason for visit:  Swollen hand follow up    She eats 2-3 servings of fruits and vegetables daily.She consumes 0 sweetened beverage(s) daily.She exercises with enough effort to increase her heart rate 30 to 60 minutes per day.  She exercises with enough effort to increase her heart rate 3 or less days per week.   She is taking medications regularly.     Wrist swelling, x 4 months, she is on arimidex  Got xray and U/S which showed OA, tenosynovitis  She reports mild pain with movement, 3-4/10,d doesn't feel medications are necessary  No other joint pain/stiffness/swelling  Tried ice/heat, taking ibuprofen  No trigger/injury  Family hx of SLE but no hx of inflammatory arthritis  R handed  No joint stiffness, no hx of gout    R  hip arthritis, got MRI and injection in R hip, feeling better                    Objective    /81 (BP Location: Right arm, Patient Position: Sitting, Cuff Size: Adult Regular)   Pulse 57   Wt 62 kg (136 lb 11.2 oz)   SpO2 96%   BMI 22.75 kg/m    Body mass index is 22.75 kg/m .  Physical Exam   GENERAL: alert and no  distress  MS: area of swelling distal to R wrist on volar surface over 2-4 MCPs, no erythema/warmth, no pain/synovitis of wrist/MCPs/PIPs/DIPs, remains of joint exam normal            Signed Electronically by: Debi Pizarro MD

## 2024-05-23 NOTE — PATIENT INSTRUCTIONS
[facility] scheduling information: [phone] (Imaging).    Let me know if you can't get it set up in next 2-3 weeks.

## 2024-06-02 ENCOUNTER — MYC REFILL (OUTPATIENT)
Dept: INTERNAL MEDICINE | Facility: CLINIC | Age: 69
End: 2024-06-02
Payer: MEDICARE

## 2024-06-02 DIAGNOSIS — E78.5 HYPERLIPIDEMIA LDL GOAL <100: ICD-10-CM

## 2024-06-02 RX ORDER — SIMVASTATIN 20 MG
20 TABLET ORAL AT BEDTIME
Qty: 90 TABLET | Refills: 3 | Status: CANCELLED | OUTPATIENT
Start: 2024-06-02

## 2024-06-03 RX ORDER — SIMVASTATIN 20 MG
20 TABLET ORAL AT BEDTIME
Qty: 90 TABLET | Refills: 3 | Status: SHIPPED | OUTPATIENT
Start: 2024-06-03 | End: 2024-08-29

## 2024-06-03 NOTE — TELEPHONE ENCOUNTER
Last Clinic Visit: 5/23/2024 [facility] [place]    simvastatin (ZOCOR) 20 MG tablet: passed protocol  - refills sent

## 2024-06-03 NOTE — TELEPHONE ENCOUNTER
simvastatin (ZOCOR) 20 MG tablet: addressed in other encounter  - refills sent 6/3/2024  - message sent to patient

## 2024-06-09 ENCOUNTER — HEALTH MAINTENANCE LETTER (OUTPATIENT)
Age: 69
End: 2024-06-09

## 2024-06-16 ENCOUNTER — ANCILLARY PROCEDURE (OUTPATIENT)
Dept: MRI IMAGING | Facility: CLINIC | Age: 69
End: 2024-06-16
Attending: INTERNAL MEDICINE
Payer: MEDICARE

## 2024-06-16 DIAGNOSIS — M65.949 TENOSYNOVITIS OF HAND: ICD-10-CM

## 2024-06-16 DIAGNOSIS — M25.431 WRIST SWELLING, RIGHT: ICD-10-CM

## 2024-06-16 PROCEDURE — A9585 GADOBUTROL INJECTION: HCPCS | Mod: JZ | Performed by: RADIOLOGY

## 2024-06-16 PROCEDURE — G1010 CDSM STANSON: HCPCS | Performed by: RADIOLOGY

## 2024-06-16 PROCEDURE — 73223 MRI JOINT UPR EXTR W/O&W/DYE: CPT | Mod: RT | Performed by: RADIOLOGY

## 2024-06-16 RX ORDER — GADOBUTROL 604.72 MG/ML
7.5 INJECTION INTRAVENOUS ONCE
Status: COMPLETED | OUTPATIENT
Start: 2024-06-16 | End: 2024-06-16

## 2024-06-16 RX ADMIN — GADOBUTROL 6 ML: 604.72 INJECTION INTRAVENOUS at 07:34

## 2024-06-18 ENCOUNTER — MYC MEDICAL ADVICE (OUTPATIENT)
Dept: INTERNAL MEDICINE | Facility: CLINIC | Age: 69
End: 2024-06-18
Payer: MEDICARE

## 2024-06-20 ENCOUNTER — OFFICE VISIT (OUTPATIENT)
Dept: ORTHOPEDICS | Facility: CLINIC | Age: 69
End: 2024-06-20
Attending: INTERNAL MEDICINE
Payer: MEDICARE

## 2024-06-20 ENCOUNTER — PRE VISIT (OUTPATIENT)
Dept: ORTHOPEDICS | Facility: CLINIC | Age: 69
End: 2024-06-20

## 2024-06-20 DIAGNOSIS — M65.949 TENOSYNOVITIS OF HAND: ICD-10-CM

## 2024-06-20 DIAGNOSIS — M25.431 WRIST SWELLING, RIGHT: ICD-10-CM

## 2024-06-20 PROCEDURE — 99204 OFFICE O/P NEW MOD 45 MIN: CPT | Performed by: FAMILY MEDICINE

## 2024-06-20 RX ORDER — DICLOFENAC SODIUM 75 MG/1
75 TABLET, DELAYED RELEASE ORAL 2 TIMES DAILY PRN
Qty: 28 TABLET | Refills: 1 | Status: SHIPPED | OUTPATIENT
Start: 2024-06-20 | End: 2024-08-29

## 2024-06-20 NOTE — PROGRESS NOTES
[facility]  SPORTS & ORTHOPEDIC CLINIC VISIT     Jun 20, 2024        ASSESSMENT & PLAN    69-year-old with ulnar-sided wrist pain that is multifactorial.  Acutely likely due to tenosynovitis demonstrated on MRI though has some evidence of discomfort and pain at the DRUJ and TFCC as well    Reviewed imaging and assessment with patient in detail  Provided with wrist brace to use as needed for comfort with activity  Strongly encouraged follow-up with hand therapy and referral was provided.  Given the current swelling and tenosynovitis present we will try a short course of anti-inflammatory such as diclofenac.  Prescription sent to the pharmacy.  We discussed indication for steroid injection and she will follow-up with us if she would like to pursue this option.    Jossue Hoang MD  [facility] SPORTS MEDICINE CLINIC [place]    -----  Chief Complaint   Patient presents with    Right Wrist - Pain       SUBJECTIVE  Elisha Gao is a/an 69 year old female who is seen in consultation at the request of  Debi Pizarro M.D. for evaluation of  right wrist pain and swelling.     The patient is seen by themselves.  The patient is Right handed    Onset: 4 month(s) ago. Reports insidious onset without acute precipitating event.  Location of Pain: right dorsal wrist/ hand   Worsened by: pushing up out of chair, use of hands  Better with: NA   Treatments tried: ibuprofen  Associated symptoms: swelling    Orthopedic/Surgical history: NO  Social History/Occupation: Retired       REVIEW OF SYSTEMS:  Do you have fever, chills, weight loss? No  Do you have any vision problems? No  Do you have any chest pain or edema? No  Do you have any shortness of breath or wheezing?  No  Do you have stomach problems? No  Do you have any numbness or focal weakness? No  Do you have diabetes? No  Do you have problems with bleeding or clotting? No  Do you have an rashes or other skin lesions? No    OBJECTIVE:  There  were no vitals taken for this visit.     General  - alert, pleasant, no distress  CV  - normal radial pulse, cap refill brisk  Musculoskeletal -right wrist  - inspection: normal joint alignment, no obvious deformity, mild swelling over the dorsal ulnar wrist  - palpation: TTP over the TFCC and ulnar dorsal carpal area.  Also mild tenderness over the DRUJ.  No bony or soft tissue tenderness, no tenderness at the anatomical snuffbox  - ROM:  90 deg flexion   70 deg extension with pain   25 deg abduction   65 deg adduction with pain  - strength: 5/5  strength, 5/5 flexion, extension, pronation, supination, adduction, abduction  - special tests:  (-) Tinel's  (-) Finkelstein  (-) Phalen  (-) Manuel click test  (-) ulnar impaction  Neuro  - no sensory or motor deficit, grossly normal coordination, normal muscle tone  Skin  - no ecchymosis, erythema, warmth, or induration, no obvious rash        RADIOLOGY:    Three-view x-rays of the right hand are performed 2/20/2024 and reviewed independently demonstrating no acute fracture or dislocation.  At least moderate DJD of the triscaphe joint mild DJD in the first CMC.  See EMR for formal radiology report.    Reviewed MRI dated 6/16/2024 of the right wrist.  Per radiology report:  IMPRESSION:     1. Tenosynovitis of the fourth extensor compartment (extensor  communis).     2. Ulnar negative variance with distal radioulnar joint degenerative  change.  *  Tearing of the central disc of the triangle fibrocartilage.

## 2024-06-20 NOTE — TELEPHONE ENCOUNTER
DIAGNOSIS: Wrist swelling, right [M25.431]  Tenosynovitis of hand [M65.9] / Debi Pizarro [place] [facility]     APPOINTMENT DATE: 6.20.24   NOTES STATUS DETAILS   OFFICE NOTE from referring provider Internal 5.23.24  [facility]  [facility]   OFFICE NOTE from other specialist Internal 2.20.24  [facility]  IM   MEDICATION LIST Internal    MRI Internal 6.16.24  MR Wrist Right   XRAYS (IMAGES & REPORTS) Internal 2.20.24  XR Wrist Right

## 2024-06-25 ENCOUNTER — THERAPY VISIT (OUTPATIENT)
Dept: OCCUPATIONAL THERAPY | Facility: CLINIC | Age: 69
End: 2024-06-25
Payer: MEDICARE

## 2024-06-25 DIAGNOSIS — M25.531 RIGHT WRIST PAIN: Primary | ICD-10-CM

## 2024-06-25 DIAGNOSIS — M25.431 WRIST SWELLING, RIGHT: ICD-10-CM

## 2024-06-25 DIAGNOSIS — M65.949 TENOSYNOVITIS OF HAND: ICD-10-CM

## 2024-06-25 PROCEDURE — 97110 THERAPEUTIC EXERCISES: CPT | Mod: GO | Performed by: OCCUPATIONAL THERAPIST

## 2024-06-25 PROCEDURE — 97166 OT EVAL MOD COMPLEX 45 MIN: CPT | Mod: GO | Performed by: OCCUPATIONAL THERAPIST

## 2024-06-25 NOTE — PROGRESS NOTES
"OCCUPATIONAL THERAPY EVALUATION  Type of Visit: Evaluation    See electronic medical record for Abuse and Falls Screening details.    Subjective   Presenting condition or subjective complaint:  Right wrist pain  Date of onset: About 4 months ago (Order date: 6/20/24)    Relevant medical history:   Past Medical History:   Diagnosis Date    Breast cancer (H) 03/2022    Chronic low back pain     for a couple years, worse at night, not evaluated    Depression     Essential tremor     Invasive ductal carcinoma of breast, female, right (H)     osteopenia     FRAX  11/2%   2022     Dates & types of surgery:   Past Surgical History:   Procedure Laterality Date    ABDOMEN SURGERY  1987    BREAST SURGERY      COLONOSCOPY  2020    GYN SURGERY  1987    HYSTERECTOMY  1987    endometriosis, ovarian cysts, hormones x 10  years    LASIK Bilateral     LUMPECTOMY, BREAST, LOCALIZED USING RADIOFREQUENCY IDENTIFICATION Right 04/11/2022    Procedure: LUMPECTOMY, BREAST, LOCALIZED USING RADIOFREQUENCY IDENTIFICATION, sentinel lymph node biopsy;  Surgeon: Santy Garcia MD;  Location: [facility] OR     Prior diagnostic imaging/testing results: X-ray and MRI.  X-ray from 2/20/24: No acute osseous abnormality. Mild degenerative changes of the first carpometacarpal and moderate to high grade degenerative change of the triscaphe joints, specifically the scaphoid trapezium articulation.\"    MRI from 6/16/24: Tenosynovitis of the fourth extensor compartment (extensor communis). Ulnar negative variance with distal radioulnar joint degenerative change. Tearing of the central disc of the triangular fibrocartilage complex.    Prior therapy history for the same diagnosis, illness or injury: None    Prior level of function:  Transfers: Independent  Ambulation: Independent  ADL: Independent  IADL: Independent     Living environment: Patient is independent at home and there are no concerns about accessibility and mobility in the home.    Employment: Retired "   Hobbies/Interests: Golf    Patient goals for therapy: To have less pain with use of right wrist and hand     Objective   Right hand dominant  Patient reports symptoms of pain, stiffness/loss of motion, and edema    Pain Level (Scale 0-10)   6/25/2024   At Rest 2/10   With Use 9/10     Pain Description  Date 6/25/2024   Location Dorsum of the wrist and hand   Pain Quality Ache   Frequency intermittent     Pain is worst Daytime   Exacerbated by  Pushing up from a chair or out of bed   Relieved by Wearing wrist brace   Progression Unchanged     Edema    Mild edema present on the dorsum of the hand.    Wrist edema  Circumference:  (Measured in cm)  Distal wrist crease 6/25/2024   Right 15.0   Left 14.8     Hand edema  Circumference:  (Measured in cm)  Figure-8 6/25/2024   Right 39.0   Left 35.0       Sensation   WNL throughout all nerve distributions; per patient report    ROM  Pain Report: - none  + mild    ++ moderate    +++ severe   Wrist 6/25/2024 6/25/2024   AROM (PROM) Left Right   Extension 60 40++, dorsum of hand   Flexion 70 65+, dorsum of the hand   RD 25 25   UD 30 40   Supination 85 85   Pronation 85 85   6/25/2024- No pain with passive finger flexion. Pain with passive finger flexion combined with passive wrist flexion.  Resisted Testing  Pain Report: - none  + mild    ++ moderate    +++ severe    6/25/2024      Right   Wrist extension, RD +,crepitus   Wrist extension, UD -   Wrist flexion, RD -   Wrist flexion, UD -   Finger extension -   Finger flexion -     Strength   (Measured in pounds)  Pain Report: - none  + mild    ++ moderate    +++ severe    6/25/2024 6/25/2024   Trials Left Right   1  2 43  44 46  46   Average 43.5 46       Assessment & Plan   CLINICAL IMPRESSIONS  Medical Diagnosis: Right wrist pain    Treatment Diagnosis: Right wrist pain    Impression/Assessment: Pt is a 69 year old female presenting to Occupational Therapy due to the above condition.  The following significant findings  have been identified: Impaired activity tolerance, Impaired ROM, and Pain.  These identified deficits interfere with their ability to perform recreational activities and household chores as compared to previous level of function.   Patient's limitations or Problem List includes: Pain, Decreased ROM/motion, and Increased edema of the right wrist which interferes with the patient's ability to perform Sleep Patterns, Recreational Activities, and Household Chores as compared to previous level of function.    Clinical Decision Making (Complexity):  Assessment of Occupational Performance: 3-5 Performance Deficits  Occupational Performance Limitations: home establishment and management, sleep, and leisure activities  Clinical Decision Making (Complexity): Low complexity    PLAN OF CARE  Treatment Interventions:  Modalities:  US  Therapeutic Exercise:  AROM, AAROM, PROM, Tendon Gliding, Isotonics, and Isometrics  Manual Techniques:  Myofascial release and Manual edema mobilization  Orthotic Fabrication:  As indiciated    Long Term Goals   OT Goal 1  Goal Identifier: ADL  Goal Description: Patient will be able to push up from bed with 2/10 wrist pain or less.  Target Date: 08/06/24      Frequency of Treatment: 1x/week  Duration of Treatment: 6 weeks     Recommended Referrals to Other Professionals:  N/A  Education Assessment: Learner/Method: Patient;No Barriers to Learning     Risks and benefits of evaluation/treatment have been explained.   Patient/Family/caregiver agrees with Plan of Care.     Evaluation Time:    OT Eval, Moderate Complexity Minutes (51799): 30    Signing Clinician: Viviana Deal OT        [facility]                                                                                   OUTPATIENT OCCUPATIONAL THERAPY      PLAN OF TREATMENT FOR OUTPATIENT REHABILITATION   Patient's Last Name, First Name, Elisha Crowley YOB: 1955   Provider's Name   [facility] [place]  [facility]   Medical Record No.  [id number]     Onset Date: 06/20/24 (Symptom onset about 4 months ago) Start of Care Date: 06/25/24     Medical Diagnosis:  Right wrist pain      OT Treatment Diagnosis:  Right wrist pain Plan of Treatment  Frequency/Duration:1x/week/6 weeks    Certification date from 06/25/24   To 08/06/24        See note for plan of treatment details and functional goals     Viviana Deal OT                         I CERTIFY THE NEED FOR THESE SERVICES FURNISHED UNDER        THIS PLAN OF TREATMENT AND WHILE UNDER MY CARE     (Physician attestation of this document indicates review and certification of the therapy plan).              Referring Provider:  Jossue Hoang MD    Initial Assessment  See Epic Evaluation- 06/25/24

## 2024-07-03 ENCOUNTER — TELEPHONE (OUTPATIENT)
Dept: ORTHOPEDICS | Facility: CLINIC | Age: 69
End: 2024-07-03
Payer: MEDICARE

## 2024-07-03 NOTE — TELEPHONE ENCOUNTER
Left Voicemail (1st Attempt) and Sent Mychart (1st Attempt) for the patient to call back and schedule the following:    Appointment type: New hand/wrist  Provider: Dr. Traylor  Return date: 7/31/24 open spot ok per Gaetano

## 2024-07-03 NOTE — TELEPHONE ENCOUNTER
----- Message from Gaetano CASTRO sent at 7/3/2024  9:25 AM CDT -----  Regarding: Schedule  Please reach out to patient and help her get scheduled with Dr Traylor on 7/31/24 for Right Wrist tenosynovitis      Thank you    GAETANO MOORE, ATC

## 2024-07-05 ENCOUNTER — THERAPY VISIT (OUTPATIENT)
Dept: OCCUPATIONAL THERAPY | Facility: CLINIC | Age: 69
End: 2024-07-05
Payer: MEDICARE

## 2024-07-05 ENCOUNTER — MYC MEDICAL ADVICE (OUTPATIENT)
Dept: INTERNAL MEDICINE | Facility: CLINIC | Age: 69
End: 2024-07-05

## 2024-07-05 DIAGNOSIS — M25.531 RIGHT WRIST PAIN: Primary | ICD-10-CM

## 2024-07-05 PROCEDURE — 97035 APP MDLTY 1+ULTRASOUND EA 15: CPT | Mod: GO | Performed by: OCCUPATIONAL THERAPIST

## 2024-07-05 PROCEDURE — 97140 MANUAL THERAPY 1/> REGIONS: CPT | Mod: GO | Performed by: OCCUPATIONAL THERAPIST

## 2024-07-05 NOTE — TELEPHONE ENCOUNTER
Left Voicemail (2nd Attempt) and Sent Mychart (2nd Attempt) for the patient to call back and schedule the following:    Appointment type: New hand/wrist  Provider: Dr. Traylor  Return date: next available

## 2024-07-09 ENCOUNTER — MYC REFILL (OUTPATIENT)
Dept: ONCOLOGY | Facility: CLINIC | Age: 69
End: 2024-07-09
Payer: MEDICARE

## 2024-07-09 DIAGNOSIS — Z17.0 MALIGNANT NEOPLASM OF UPPER-OUTER QUADRANT OF RIGHT BREAST IN FEMALE, ESTROGEN RECEPTOR POSITIVE (H): ICD-10-CM

## 2024-07-09 DIAGNOSIS — C50.411 MALIGNANT NEOPLASM OF UPPER-OUTER QUADRANT OF RIGHT BREAST IN FEMALE, ESTROGEN RECEPTOR POSITIVE (H): ICD-10-CM

## 2024-07-09 RX ORDER — ANASTROZOLE 1 MG/1
1 TABLET ORAL DAILY
Qty: 90 TABLET | Refills: 3 | Status: SHIPPED | OUTPATIENT
Start: 2024-07-09

## 2024-07-09 NOTE — TELEPHONE ENCOUNTER
Pending Prescriptions:                       Disp   Refills    anastrozole (ARIMIDEX) 1 MG tablet        90 tab*3            Sig: Take 1 tablet (1 mg) by mouth daily    Last prescribing provider: Caprice Santos    Last clinic visit date: 03/18/24 w/Caprice Santos    Recommendations for requested medication (if none, N/A): Copied from last OV note:  She is on treatment with anastrozole. She is tolerating well. Plan is to treat with a total 5 years of endocrine therapy.     Any other pertinent information (if none, N/A): N/A    Refilled: Y/N, if NO, why?

## 2024-07-14 RX ORDER — HEPARIN SODIUM,PORCINE 10 UNIT/ML
5 VIAL (ML) INTRAVENOUS
Status: CANCELLED | OUTPATIENT
Start: 2024-09-16

## 2024-07-14 RX ORDER — HEPARIN SODIUM (PORCINE) LOCK FLUSH IV SOLN 100 UNIT/ML 100 UNIT/ML
5 SOLUTION INTRAVENOUS
Status: CANCELLED | OUTPATIENT
Start: 2024-09-16

## 2024-07-14 RX ORDER — ZOLEDRONIC ACID 0.04 MG/ML
4 INJECTION, SOLUTION INTRAVENOUS ONCE
Status: CANCELLED | OUTPATIENT
Start: 2024-09-16 | End: 2024-09-16

## 2024-07-14 NOTE — PROGRESS NOTES
Oncology Visit:  Date on this visit: Jul 15, 2024    Diagnosis: right breast cancer.    Primary Physician: Debi Pizarro     History Of Present Illness:  Ms. Gao is a 69 year old female with right breast cancer.  Routine screening mammogram on 3/17/2022 showed an asymmetry in the upper outer right breast.  Diagnostic breast imaging confirmed a 4 mm spiculated mass at 11:00, 4 cm from the nipple of the right breast.  Biopsy was consistent with a grade 2 invasive ductal carcinoma, ER strong in 98%, WA moderate in 40%, and HER2 negative by FISH and atypical ductal hyperplasia.  Patient underwent right breast lumpectomy and right axillary sentinel lymph node biopsy under the care of Dr. Garcia on 4/11/2022.  Pathology showed a grade 2 invasive ductal carcinoma measuring 9 mm with associated low grade DCIS.  Surgical margins were negative.  There was no lymphovascular invasion and two sentinel lymph nodes were benign.  She completed radiation, 2600 cGy in 5 fractions, to the right breast on 5/25/2022.  She has been on anastrozole since 6/15/2022.    Interval History:   Ms. Gao comes into clinic today for an on treatment visit.  She continues on adjuvant curative treatment with anastrozole.  Overall, she has been doing well since last visit. She is seeing a hand specialist (Dr Hoang on 8/5) for her right hand due to right hand swelling. She has intermittent right hand pain. This started about 4 months ago. MRI of the right wrist from 6/16 showed tenosynovitis of fourth extensor compartment and ulnar negative varance with distal radioulnar joint degenerative changes.     She denies chest pain, dyspnea, new lumps/bumps, unintentional weight loss, n/v/d, abdominal pain, fevers, or infections. She has not had any recent falls. She continues to take Calcium and Vitamin D supplements.     Past Medical/Surgical History:  Past Medical History:   Diagnosis Date    Breast cancer (H) 03/2022    Chronic low back pain     for a  couple years, worse at night, not evaluated    Depression     Essential tremor     Invasive ductal carcinoma of breast, female, right (H)     osteopenia     FRAX  11/2%   2022   Colonoscopy in 2012 w/o polyps, next due in 2022.    Past Surgical History:   Procedure Laterality Date    ABDOMEN SURGERY  1987    BREAST SURGERY      COLONOSCOPY  2020    GYN SURGERY  1987    HYSTERECTOMY  1987    endometriosis, ovarian cysts, hormones x 10  years    LASIK Bilateral     LUMPECTOMY, BREAST, LOCALIZED USING RADIOFREQUENCY IDENTIFICATION Right 04/11/2022    Procedure: LUMPECTOMY, BREAST, LOCALIZED USING RADIOFREQUENCY IDENTIFICATION, sentinel lymph node biopsy;  Surgeon: Santy Garcia MD;  Location: [facility] OR     Allergies:  Allergies as of 07/15/2024 - Reviewed 06/20/2024   Allergen Reaction Noted    Alendronate Nausea and Vomiting 05/26/2010    Raloxifene Nausea 05/26/2010    Risedronate Nausea and Vomiting 05/26/2010     Current Medications:  Current Outpatient Medications   Medication Sig Dispense Refill    anastrozole (ARIMIDEX) 1 MG tablet Take 1 tablet (1 mg) by mouth daily 90 tablet 3    buPROPion (WELLBUTRIN XL) 150 MG 24 hr tablet Take 1 tablet (150 mg) by mouth every morning 90 tablet 1    cephALEXin (KEFLEX) 500 MG capsule Take 1 capsule (500 mg) by mouth 3 times daily 21 capsule 0    citalopram (CELEXA) 40 MG tablet Take 1 tablet (40 mg) by mouth daily 90 tablet 1    diclofenac (VOLTAREN) 75 MG EC tablet Take 1 tablet (75 mg) by mouth 2 times daily as needed for moderate pain 28 tablet 1    magnesium 250 MG tablet Take 1 tablet by mouth every morning       ondansetron (ZOFRAN ODT) 4 MG ODT tab Take 1 tablet (4 mg) by mouth every 8 hours as needed for nausea 18 tablet 0    simvastatin (ZOCOR) 20 MG tablet Take 1 tablet (20 mg) by mouth at bedtime 90 tablet 3    Vitamin D, Cholecalciferol, 25 MCG (1000 UT) TABS Take 3 tablets by mouth every morning         Family and Social History:  See initial consultation dated  4/4/2022 for further details.  Breast Actionable panel on 4/4/2022 was negative for germline genetic mutation.    Physical Exam:  BP (!) 142/96 (BP Location: Right arm, Patient Position: Sitting, Cuff Size: Adult Regular)   Pulse 65   Temp 98.5  F (36.9  C) (Oral)   Resp 20   Wt 61.1 kg (134 lb 9.6 oz)   SpO2 96%   BMI 22.40 kg/m    General:  Well appearing adult female in NAD.  Alert and oriented x 3.  HEENT:  Normocephalic.  Sclera anicteric.    Lymph:  No palpable cervical, supraclavicular, or axillary LAD.  Chest:  CTAB, normal work of breathing   Breast:  Right axillary and right upper outer breast incision are well healed. There is some fibrosis along the upper outer lumpectomy site unchanged from prior. There are no discretely palpable masses in either breast.  Bilateral nipples are everted.   Abd:  Soft/ND/NT   Ext:  No edema of the bilateral lower extremities.   Neuro:  Cranial nerves grossly intact.  Gait stable.  Musculo:  Right hand swelling, in a brace.  Psych:  Mood and affect appear normal.  Skin:  No visible concerning skin rashes or lesions.    Laboratory/Imaging Studies  I personally reviewed the below images while in clinic today:    5/6/2024 DEXA bone density scan:  Results   Lumbar spine   T-score -0.9 (L1-3), BMD is 1.063 g/cm2     Left femoral neck  T-score -1.5, BMD is 0.833 g/cm2     Right femoral neck  T-score -2.1, BMD is 0.740 g/cm2     Left total hip  T-score -0.9, BMD is 0.892 g/cm2     Right total hip  T-score -1.4, BMD is 0.827 g/cm2      ASSESSMENT/PLAN:  68 yo female with stage Ia, [id number], grade 2, ER/NC positive, HER-2 negative invasive ductal carcinoma of the right breast.   She is s/p right breast lumpectomy and right axillary sentinel lymph node procedure.    1.  Right breast cancer:  Ms. Gao is 2 years, 3 months out from excision of a right breast cancer.  She is on adjuvant curative intent treatment with anastrozole. She is tolerating well. Plan is to treat with a  total 5 years of endocrine therapy.    There are no concerning signs or symptoms of recurrence today.   - Bilateral screening mammograms are due around 12/18/2024, we will see her back at the same time.    2.  Osteopenia:  DEXA bone density scan 5/6/2024 was reviewed and shows a lowest T-score of -2.1 c/w osteopenia (was -2.0 in 2022).  She is at high risk of osteoporosis on anastrozole therapy.  - Zometa 4 mg IV once every 6 months for 3 years, to prevent bone loss on aromatase inhibitor therapy and also reduce the risk of breast cancer bone metastasis was started on 9/14/2022.    - C5 Zometa is due around 9/16/2024   - Recommend she take calcium 1200 mg and vitamin D 1000 IUs daily  - Walk 30 minutes daily.    3.  Right hand tenosynovitis:  She has right hand swelling today and is in a brace.    - Ongoing follow up with orthopedic surgery.    4.  Follow Up:  Labs and Zometa infusion around 9/16/2024.  Bilateral screening mammograms and visit with me 12/18/2024 or later.    Patient was seen and discussed with oncology fellow, Dr. Moises Walton. The oncology fellow was personally supervised by me during the patient examination. I personally verified the medical history, performed a physical exam and the medical decision-making. I made appropriate changes to the documentation and the assessment and plan based on my verification, exam, and medical decision making.     Gardenia Kaplan MD

## 2024-07-15 ENCOUNTER — ONCOLOGY VISIT (OUTPATIENT)
Dept: ONCOLOGY | Facility: CLINIC | Age: 69
End: 2024-07-15
Attending: INTERNAL MEDICINE
Payer: MEDICARE

## 2024-07-15 VITALS
OXYGEN SATURATION: 96 % | BODY MASS INDEX: 22.4 KG/M2 | TEMPERATURE: 98.5 F | SYSTOLIC BLOOD PRESSURE: 142 MMHG | HEART RATE: 65 BPM | WEIGHT: 134.6 LBS | RESPIRATION RATE: 20 BRPM | DIASTOLIC BLOOD PRESSURE: 96 MMHG

## 2024-07-15 DIAGNOSIS — Z17.0 MALIGNANT NEOPLASM OF UPPER-OUTER QUADRANT OF RIGHT BREAST IN FEMALE, ESTROGEN RECEPTOR POSITIVE (H): Primary | ICD-10-CM

## 2024-07-15 DIAGNOSIS — Z12.31 VISIT FOR SCREENING MAMMOGRAM: ICD-10-CM

## 2024-07-15 DIAGNOSIS — Z79.811 LONG TERM CURRENT USE OF AROMATASE INHIBITOR: ICD-10-CM

## 2024-07-15 DIAGNOSIS — C50.411 MALIGNANT NEOPLASM OF UPPER-OUTER QUADRANT OF RIGHT BREAST IN FEMALE, ESTROGEN RECEPTOR POSITIVE (H): Primary | ICD-10-CM

## 2024-07-15 DIAGNOSIS — M85.89 OSTEOPENIA OF MULTIPLE SITES: ICD-10-CM

## 2024-07-15 PROCEDURE — 99213 OFFICE O/P EST LOW 20 MIN: CPT | Mod: GC | Performed by: INTERNAL MEDICINE

## 2024-07-15 PROCEDURE — G0463 HOSPITAL OUTPT CLINIC VISIT: HCPCS | Performed by: INTERNAL MEDICINE

## 2024-07-15 ASSESSMENT — PAIN SCALES - GENERAL: PAINLEVEL: NO PAIN (0)

## 2024-07-15 NOTE — LETTER
7/15/2024      Elisha Gao  [address]  [place] [address]      Dear Colleague,    Thank you for referring your patient, Elisha Gao, to the [facility]. Please see a copy of my visit note below.    Oncology Visit:  Date on this visit: Jul 15, 2024    Diagnosis: right breast cancer.    Primary Physician: Debi Pizarro     History Of Present Illness:  Ms. Gao is a 69 year old female with right breast cancer.  Routine screening mammogram on 3/17/2022 showed an asymmetry in the upper outer right breast.  Diagnostic breast imaging confirmed a 4 mm spiculated mass at 11:00, 4 cm from the nipple of the right breast.  Biopsy was consistent with a grade 2 invasive ductal carcinoma, ER strong in 98%, LA moderate in 40%, and HER2 negative by FISH and atypical ductal hyperplasia.  Patient underwent right breast lumpectomy and right axillary sentinel lymph node biopsy under the care of Dr. Garcia on 4/11/2022.  Pathology showed a grade 2 invasive ductal carcinoma measuring 9 mm with associated low grade DCIS.  Surgical margins were negative.  There was no lymphovascular invasion and two sentinel lymph nodes were benign.  She completed radiation, 2600 cGy in 5 fractions, to the right breast on 5/25/2022.  She has been on anastrozole since 6/15/2022.    Interval History:   Ms. Gao comes into clinic today for an on treatment visit.  She continues on adjuvant curative treatment with anastrozole.  Overall, she has been doing well since last visit. She is seeing a hand specialist (Dr Hoang on 8/5) for her right hand due to right hand swelling. She has intermittent right hand pain. This started about 4 months ago. MRI of the right wrist from 6/16 showed tenosynovitis of fourth extensor compartment and ulnar negative varance with distal radioulnar joint degenerative changes.     She denies chest pain, dyspnea, new lumps/bumps, unintentional weight loss, n/v/d, abdominal pain, fevers,  or infections. She has not had any recent falls. She continues to take Calcium and Vitamin D supplements.     Past Medical/Surgical History:  Past Medical History:   Diagnosis Date     Breast cancer (H) 03/2022     Chronic low back pain     for a couple years, worse at night, not evaluated     Depression      Essential tremor      Invasive ductal carcinoma of breast, female, right (H)      osteopenia     FRAX  11/2%   2022   Colonoscopy in 2012 w/o polyps, next due in 2022.    Past Surgical History:   Procedure Laterality Date     ABDOMEN SURGERY  1987     BREAST SURGERY       COLONOSCOPY  2020     GYN SURGERY  1987     HYSTERECTOMY  1987    endometriosis, ovarian cysts, hormones x 10  years     LASIK Bilateral      LUMPECTOMY, BREAST, LOCALIZED USING RADIOFREQUENCY IDENTIFICATION Right 04/11/2022    Procedure: LUMPECTOMY, BREAST, LOCALIZED USING RADIOFREQUENCY IDENTIFICATION, sentinel lymph node biopsy;  Surgeon: Santy Garcia MD;  Location: [facility] OR     Allergies:  Allergies as of 07/15/2024 - Reviewed 06/20/2024   Allergen Reaction Noted     Alendronate Nausea and Vomiting 05/26/2010     Raloxifene Nausea 05/26/2010     Risedronate Nausea and Vomiting 05/26/2010     Current Medications:  Current Outpatient Medications   Medication Sig Dispense Refill     anastrozole (ARIMIDEX) 1 MG tablet Take 1 tablet (1 mg) by mouth daily 90 tablet 3     buPROPion (WELLBUTRIN XL) 150 MG 24 hr tablet Take 1 tablet (150 mg) by mouth every morning 90 tablet 1     cephALEXin (KEFLEX) 500 MG capsule Take 1 capsule (500 mg) by mouth 3 times daily 21 capsule 0     citalopram (CELEXA) 40 MG tablet Take 1 tablet (40 mg) by mouth daily 90 tablet 1     diclofenac (VOLTAREN) 75 MG EC tablet Take 1 tablet (75 mg) by mouth 2 times daily as needed for moderate pain 28 tablet 1     magnesium 250 MG tablet Take 1 tablet by mouth every morning        ondansetron (ZOFRAN ODT) 4 MG ODT tab Take 1 tablet (4 mg) by mouth every 8 hours as needed for  nausea 18 tablet 0     simvastatin (ZOCOR) 20 MG tablet Take 1 tablet (20 mg) by mouth at bedtime 90 tablet 3     Vitamin D, Cholecalciferol, 25 MCG (1000 UT) TABS Take 3 tablets by mouth every morning         Family and Social History:  See initial consultation dated 4/4/2022 for further details.  Breast Actionable panel on 4/4/2022 was negative for germline genetic mutation.    Physical Exam:  BP (!) 142/96 (BP Location: Right arm, Patient Position: Sitting, Cuff Size: Adult Regular)   Pulse 65   Temp 98.5  F (36.9  C) (Oral)   Resp 20   Wt 61.1 kg (134 lb 9.6 oz)   SpO2 96%   BMI 22.40 kg/m    General:  Well appearing adult female in NAD.  Alert and oriented x 3.  HEENT:  Normocephalic.  Sclera anicteric.    Lymph:  No palpable cervical, supraclavicular, or axillary LAD.  Chest:  CTAB, normal work of breathing   Breast:  Right axillary and right upper outer breast incision are well healed. There is some fibrosis along the upper outer lumpectomy site unchanged from prior. There are no discretely palpable masses in either breast.  Bilateral nipples are everted.   Abd:  Soft/ND/NT   Ext:  No edema of the bilateral lower extremities.   Neuro:  Cranial nerves grossly intact.  Gait stable.  Musculo:  Right hand swelling, in a brace.  Psych:  Mood and affect appear normal.  Skin:  No visible concerning skin rashes or lesions.    Laboratory/Imaging Studies  I personally reviewed the below images while in clinic today:    5/6/2024 DEXA bone density scan:  Results   Lumbar spine   T-score -0.9 (L1-3), BMD is 1.063 g/cm2     Left femoral neck  T-score -1.5, BMD is 0.833 g/cm2     Right femoral neck  T-score -2.1, BMD is 0.740 g/cm2     Left total hip  T-score -0.9, BMD is 0.892 g/cm2     Right total hip  T-score -1.4, BMD is 0.827 g/cm2      ASSESSMENT/PLAN:  68 yo female with stage Ia, [id number], grade 2, ER/CA positive, HER-2 negative invasive ductal carcinoma of the right breast.   She is s/p right breast lumpectomy  and right axillary sentinel lymph node procedure.    1.  Right breast cancer:  Ms. Gao is 2 years, 3 months out from excision of a right breast cancer.  She is on adjuvant curative intent treatment with anastrozole. She is tolerating well. Plan is to treat with a total 5 years of endocrine therapy.    There are no concerning signs or symptoms of recurrence today.   - Bilateral screening mammograms are due around 12/18/2024, we will see her back at the same time.    2.  Osteopenia:  DEXA bone density scan 5/6/2024 was reviewed and shows a lowest T-score of -2.1 c/w osteopenia (was -2.0 in 2022).  She is at high risk of osteoporosis on anastrozole therapy.  - Zometa 4 mg IV once every 6 months for 3 years, to prevent bone loss on aromatase inhibitor therapy and also reduce the risk of breast cancer bone metastasis was started on 9/14/2022.    - C5 Zometa is due around 9/16/2024   - Recommend she take calcium 1200 mg and vitamin D 1000 IUs daily  - Walk 30 minutes daily.    3.  Right hand tenosynovitis:  She has right hand swelling today and is in a brace.    - Ongoing follow up with orthopedic surgery.    4.  Follow Up:  Labs and Zometa infusion around 9/16/2024.  Bilateral screening mammograms and visit with me 12/18/2024 or later.    Patient was seen and discussed with oncology fellow, Dr. Moises Walton. The oncology fellow was personally supervised by me during the patient examination. I personally verified the medical history, performed a physical exam and the medical decision-making. I made appropriate changes to the documentation and the assessment and plan based on my verification, exam, and medical decision making.     Gardenia Kaplan MD        Again, thank you for allowing me to participate in the care of your patient.        Sincerely,        Gardenia Kaplan MD

## 2024-07-15 NOTE — NURSING NOTE
"Oncology Rooming Note    July 15, 2024 9:25 AM   Elisha Gao is a 69 year old female who presents for:    Chief Complaint   Patient presents with    Oncology Clinic Visit     Malignant neoplasm of upper-outer quadrant of right breast     Initial Vitals: BP (!) 142/96 (BP Location: Right arm, Patient Position: Sitting, Cuff Size: Adult Regular)   Pulse 65   Temp 98.5  F (36.9  C) (Oral)   Resp 20   Wt 61.1 kg (134 lb 9.6 oz)   SpO2 96%   BMI 22.40 kg/m   Estimated body mass index is 22.4 kg/m  as calculated from the following:    Height as of 12/14/23: 1.651 m (5' 5\").    Weight as of this encounter: 61.1 kg (134 lb 9.6 oz). Body surface area is 1.67 meters squared.  No Pain (0) Comment: Data Unavailable   No LMP recorded. Patient is postmenopausal.  Allergies reviewed: Yes  Medications reviewed: Yes    Medications: Medication refills not needed today.  Pharmacy name entered into [facility]:    [facility] - [place], [place] - [address] [place] [address] PHARMACY [place], [place] - [address] [place] [address] [phone]  [place], [place] - [address] [place] [address]    Frailty Screening:   Is the patient here for a new oncology consult visit in cancer care? 2. No      Clinical concerns: none    Marjan Albert              "

## 2024-07-23 ENCOUNTER — MYC REFILL (OUTPATIENT)
Dept: INTERNAL MEDICINE | Facility: CLINIC | Age: 69
End: 2024-07-23
Payer: MEDICARE

## 2024-07-23 DIAGNOSIS — F32.4 MAJOR DEPRESSIVE DISORDER IN PARTIAL REMISSION, UNSPECIFIED WHETHER RECURRENT (H): ICD-10-CM

## 2024-07-23 RX ORDER — CITALOPRAM HYDROBROMIDE 40 MG/1
40 TABLET ORAL DAILY
Qty: 90 TABLET | Refills: 1 | Status: CANCELLED | OUTPATIENT
Start: 2024-07-23

## 2024-07-23 RX ORDER — BUPROPION HYDROCHLORIDE 150 MG/1
150 TABLET ORAL EVERY MORNING
Qty: 90 TABLET | Refills: 1 | Status: CANCELLED | OUTPATIENT
Start: 2024-07-23

## 2024-07-26 NOTE — TELEPHONE ENCOUNTER
"buPROPion (WELLBUTRIN XL) 150 MG 24 hr tablet       Last Written Prescription Date:  1/17/24  Last Fill Quantity: 90,   # refills: 1  Last Office Visit : 5/23/24  Future Office visit:  8/29/24    Routing refill request to provider for review/approval because:  SSRIs Protocol [id number]/23/2024 09:10 AM   Protocol Details PHQ-9 score less than 5 in past 6 months   Last PHQ9  2  (2/10/23)    citalopram (CELEXA) 40 MG tablet       Last Written Prescription Date:  1/17/24  Last Fill Quantity: 90,   # refills: 1  Last Office Visit : 5/23/24  Future Office visit:  8/29/24    Routing refill request to provider for review/approval because:  Rx Protocol Bupropion [id number]/23/2024 09:10 AM   Protocol Details PHQ-9 score of less than 5 in past 6 months \"2\" on (2-10-23)      Blood pressure on file in the past 12 months     BP Readings from Last 3 Encounters:   07/15/24 (!) 142/96   05/23/24 125/81   03/18/24 (!) 148/93     "

## 2024-07-28 ENCOUNTER — MYC REFILL (OUTPATIENT)
Dept: INTERNAL MEDICINE | Facility: CLINIC | Age: 69
End: 2024-07-28
Payer: MEDICARE

## 2024-07-28 DIAGNOSIS — F32.4 MAJOR DEPRESSIVE DISORDER IN PARTIAL REMISSION, UNSPECIFIED WHETHER RECURRENT (H): ICD-10-CM

## 2024-07-28 RX ORDER — BUPROPION HYDROCHLORIDE 150 MG/1
150 TABLET ORAL EVERY MORNING
Qty: 90 TABLET | Refills: 1 | Status: CANCELLED | OUTPATIENT
Start: 2024-07-28

## 2024-07-28 RX ORDER — CITALOPRAM HYDROBROMIDE 40 MG/1
40 TABLET ORAL DAILY
Qty: 90 TABLET | Refills: 1 | Status: CANCELLED | OUTPATIENT
Start: 2024-07-28

## 2024-07-29 ENCOUNTER — MYC MEDICAL ADVICE (OUTPATIENT)
Dept: INTERNAL MEDICINE | Facility: CLINIC | Age: 69
End: 2024-07-29
Payer: MEDICARE

## 2024-07-29 DIAGNOSIS — F32.4 MAJOR DEPRESSIVE DISORDER IN PARTIAL REMISSION, UNSPECIFIED WHETHER RECURRENT (H): ICD-10-CM

## 2024-07-29 NOTE — TELEPHONE ENCOUNTER
Duplicate refill request x3.  I already re-routed the refill as high priority as its overdue for refills to the RN Pool      Hadley Rod CMA ([facility]) at 7:55 AM on 7/29/2024

## 2024-07-29 NOTE — TELEPHONE ENCOUNTER
Duplicate.  Resend refill encounter to RN pool, high priority.        Hadley Rod CMA ([facility]) at 6:32 AM on 7/29/2024

## 2024-07-30 RX ORDER — CITALOPRAM HYDROBROMIDE 40 MG/1
40 TABLET ORAL DAILY
Qty: 90 TABLET | Refills: 0 | Status: SHIPPED | OUTPATIENT
Start: 2024-07-30 | End: 2024-08-29

## 2024-07-30 RX ORDER — BUPROPION HYDROCHLORIDE 150 MG/1
150 TABLET ORAL EVERY MORNING
Qty: 90 TABLET | Refills: 0 | OUTPATIENT
Start: 2024-07-30

## 2024-07-30 RX ORDER — BUPROPION HYDROCHLORIDE 150 MG/1
150 TABLET ORAL EVERY MORNING
Qty: 90 TABLET | Refills: 0 | Status: SHIPPED | OUTPATIENT
Start: 2024-07-30 | End: 2024-08-29

## 2024-07-30 RX ORDER — CITALOPRAM HYDROBROMIDE 40 MG/1
40 TABLET ORAL DAILY
Qty: 90 TABLET | Refills: 0 | OUTPATIENT
Start: 2024-07-30

## 2024-07-31 ENCOUNTER — THERAPY VISIT (OUTPATIENT)
Dept: OCCUPATIONAL THERAPY | Facility: CLINIC | Age: 69
End: 2024-07-31
Payer: MEDICARE

## 2024-07-31 DIAGNOSIS — M25.531 RIGHT WRIST PAIN: Primary | ICD-10-CM

## 2024-07-31 PROCEDURE — 97140 MANUAL THERAPY 1/> REGIONS: CPT | Mod: GO | Performed by: OCCUPATIONAL THERAPIST

## 2024-07-31 PROCEDURE — 97035 APP MDLTY 1+ULTRASOUND EA 15: CPT | Mod: GO | Performed by: OCCUPATIONAL THERAPIST

## 2024-08-05 ENCOUNTER — OFFICE VISIT (OUTPATIENT)
Dept: ORTHOPEDICS | Facility: CLINIC | Age: 69
End: 2024-08-05
Payer: MEDICARE

## 2024-08-05 DIAGNOSIS — M65.949 TENOSYNOVITIS OF HAND: Primary | ICD-10-CM

## 2024-08-05 PROCEDURE — 76942 ECHO GUIDE FOR BIOPSY: CPT | Performed by: FAMILY MEDICINE

## 2024-08-05 PROCEDURE — 20550 NJX 1 TENDON SHEATH/LIGAMENT: CPT | Mod: RT | Performed by: FAMILY MEDICINE

## 2024-08-05 RX ADMIN — LIDOCAINE HYDROCHLORIDE 1 ML: 10 INJECTION, SOLUTION EPIDURAL; INFILTRATION; INTRACAUDAL; PERINEURAL at 15:40

## 2024-08-05 RX ADMIN — BETAMETHASONE SODIUM PHOSPHATE AND BETAMETHASONE ACETATE 6 MG: 3; 3 INJECTION, SUSPENSION INTRA-ARTICULAR; INTRALESIONAL; INTRAMUSCULAR; SOFT TISSUE at 15:40

## 2024-08-05 NOTE — PROGRESS NOTES
[facility]  SPORTS & ORTHOPEDIC CLINIC VISIT     Aug 5, 2024                 Ultrasound-guided extensor tendon injection of the right wrist    Date/Time: 8/5/2024 3:40 PM    Performed by: Jossue Hoang MD  Authorized by: Jossue Hoang MD    Indications:  Pain  Needle Size:  25 G  Guidance: ultrasound    Approach:  Dorsal  Location:  Wrist  Location comment:  R wrist 4th dorsal compartment   Medications:  6 mg betamethasone acet & sod phos 6 (3-3) MG/ML; 1 mL lidocaine (PF) 1 %  Outcome:  Tolerated well, no immediate complications  Procedure discussed: discussed risks, benefits, and alternatives    Consent Given by:  Patient  Timeout: timeout called immediately prior to procedure    Prep: patient was prepped and draped in usual sterile fashion     Patient was positioned seated with right hand on exam table in a pronated position.  The area overlying the dorsal aspect of the wrist and the area of most swelling overlying the fourth dorsal compartment was identified with ultrasound.  Ultrasound imaging demonstrated significant hypoechoic fluid collection surrounding the tendons as well as enhancement of the surrounding synovial tissue.  The dorsal wrist was cleaned with a chlorhexidine swab.    Ultrasound was necessary for the procedure to ensure proper placement of the needle clear of nearby vascular and neural structures. The skin was anesthetized with ethyl chloride spray.  A 25-gauge needle was then inserted into the tendon sheath  under direct and continuous ultrasound guidance using an in plane approach. A solution of 6 mg Celestone and 1.0 mL 1% lidocaine was injected and seen flowing around the nerve.  Images were captured and saved to the permanent record.  The patient tolerated the procedure well and there were no immediate complications.  Patient was given routine postinjection instructions and advised to follow-up with any increase in pain, redness, swelling or  drainage from the injection site.     Jossue Hoang MD

## 2024-08-05 NOTE — NURSING NOTE
[address] [address]  Dept: [phone]  ______________________________________________________________________________    Patient: Elisha Gao   : 1955   MRN: [id number]   2024    INVASIVE PROCEDURE SAFETY CHECKLIST    Date: 2024   Procedure:R Wrist USG CSI   Patient Name: Elisha Gao  MRN: [id number]  YOB: 1955    Action: Complete sections as appropriate. Any discrepancy results in a HARD COPY until resolved.     PRE PROCEDURE:  Patient ID verified with 2 identifiers (name and  or MRN): Yes  Procedure and site verified with patient/designee (when able): Yes  Accurate consent documentation in medical record: Yes  H&P (or appropriate assessment) documented in medical record: Yes  H&P must be up to 20 days prior to procedure and updates within 24 hours of procedure as applicable: Yes  Relevant diagnostic and radiology test results appropriately labeled and displayed as applicable: Yes  Procedure site(s) marked with provider initials: NA    TIMEOUT:  Time-Out performed immediately prior to starting procedure, including verbal and active participation of all team members addressing the following:Yes  * Correct patient identify  * Confirmed that the correct side and site are marked  * An accurate procedure consent form  * Agreement on the procedure to be done  * Correct patient position  * Relevant images and results are properly labeled and appropriately displayed  * The need to administer antibiotics or fluids for irrigation purposes during the procedure as applicable   * Safety precautions based on patient history or medication use    DURING PROCEDURE: Verification of correct person, site, and procedures any time the responsibility for care of the patient is transferred to another member of the care team.       Prior to injection, verified patient identity using patient's name and date of birth.  Due to  injection administration, patient instructed to remain in clinic for 15 minutes  afterwards, and to report any adverse reaction to me immediately.    Tendon sheath injection was performed.     Drug Amount Wasted:  Yes: 4 mg/ml   Vial/Syringe: Multi dose vial  Expiration Date:  4/1/28, 1/1/25      Jazmine An ATC  August 5, 2024

## 2024-08-05 NOTE — LETTER
8/5/2024      RE: Elisha Gao  [address] [facility] [address]     Dear Colleague,    Thank you for referring your patient, Elisha Gao, to the [facility] [place]. Please see a copy of my visit note below.      [facility]  SPORTS & ORTHOPEDIC CLINIC VISIT     Aug 5, 2024                 Ultrasound-guided extensor tendon injection of the right wrist    Date/Time: 8/5/2024 3:40 PM    Performed by: Jossue Hoang MD  Authorized by: Jossue Hoang MD    Indications:  Pain  Needle Size:  25 G  Guidance: ultrasound    Approach:  Dorsal  Location:  Wrist  Location comment:  R wrist 4th dorsal compartment   Medications:  6 mg betamethasone acet & sod phos 6 (3-3) MG/ML; 1 mL lidocaine (PF) 1 %  Outcome:  Tolerated well, no immediate complications  Procedure discussed: discussed risks, benefits, and alternatives    Consent Given by:  Patient  Timeout: timeout called immediately prior to procedure    Prep: patient was prepped and draped in usual sterile fashion     Patient was positioned seated with right hand on exam table in a pronated position.  The area overlying the dorsal aspect of the wrist and the area of most swelling overlying the fourth dorsal compartment was identified with ultrasound.  Ultrasound imaging demonstrated significant hypoechoic fluid collection surrounding the tendons as well as enhancement of the surrounding synovial tissue.  The dorsal wrist was cleaned with a chlorhexidine swab.    Ultrasound was necessary for the procedure to ensure proper placement of the needle clear of nearby vascular and neural structures. The skin was anesthetized with ethyl chloride spray.  A 25-gauge needle was then inserted into the tendon sheath  under direct and continuous ultrasound guidance using an in plane approach. A solution of 6 mg Celestone and 1.0 mL 1% lidocaine was injected and seen flowing around the nerve.   Images were captured and saved to the permanent record.  The patient tolerated the procedure well and there were no immediate complications.  Patient was given routine postinjection instructions and advised to follow-up with any increase in pain, redness, swelling or drainage from the injection site.     Jossue Hoang MD            Again, thank you for allowing me to participate in the care of your patient.      Sincerely,    Jossue Hoang MD

## 2024-08-07 RX ORDER — LIDOCAINE HYDROCHLORIDE 10 MG/ML
1 INJECTION, SOLUTION EPIDURAL; INFILTRATION; INTRACAUDAL; PERINEURAL
Status: SHIPPED | OUTPATIENT
Start: 2024-08-05

## 2024-08-07 RX ORDER — BETAMETHASONE SODIUM PHOSPHATE AND BETAMETHASONE ACETATE 3; 3 MG/ML; MG/ML
6 INJECTION, SUSPENSION INTRA-ARTICULAR; INTRALESIONAL; INTRAMUSCULAR; SOFT TISSUE
Status: SHIPPED | OUTPATIENT
Start: 2024-08-05

## 2024-08-26 SDOH — HEALTH STABILITY: PHYSICAL HEALTH: ON AVERAGE, HOW MANY MINUTES DO YOU ENGAGE IN EXERCISE AT THIS LEVEL?: 40 MIN

## 2024-08-26 SDOH — HEALTH STABILITY: PHYSICAL HEALTH: ON AVERAGE, HOW MANY DAYS PER WEEK DO YOU ENGAGE IN MODERATE TO STRENUOUS EXERCISE (LIKE A BRISK WALK)?: 4 DAYS

## 2024-08-26 ASSESSMENT — SOCIAL DETERMINANTS OF HEALTH (SDOH): HOW OFTEN DO YOU GET TOGETHER WITH FRIENDS OR RELATIVES?: TWICE A WEEK

## 2024-08-29 ENCOUNTER — LAB (OUTPATIENT)
Dept: LAB | Facility: CLINIC | Age: 69
End: 2024-08-29
Payer: MEDICARE

## 2024-08-29 ENCOUNTER — OFFICE VISIT (OUTPATIENT)
Dept: INTERNAL MEDICINE | Facility: CLINIC | Age: 69
End: 2024-08-29
Payer: MEDICARE

## 2024-08-29 VITALS
DIASTOLIC BLOOD PRESSURE: 86 MMHG | RESPIRATION RATE: 14 BRPM | SYSTOLIC BLOOD PRESSURE: 130 MMHG | BODY MASS INDEX: 22.66 KG/M2 | OXYGEN SATURATION: 100 % | HEIGHT: 65 IN | WEIGHT: 136 LBS | HEART RATE: 67 BPM

## 2024-08-29 DIAGNOSIS — Z23 NEED FOR VACCINATION: ICD-10-CM

## 2024-08-29 DIAGNOSIS — M65.949 TENOSYNOVITIS OF HAND: ICD-10-CM

## 2024-08-29 DIAGNOSIS — Z17.0 MALIGNANT NEOPLASM OF UPPER-OUTER QUADRANT OF RIGHT BREAST IN FEMALE, ESTROGEN RECEPTOR POSITIVE (H): ICD-10-CM

## 2024-08-29 DIAGNOSIS — M85.89 OSTEOPENIA OF MULTIPLE SITES: ICD-10-CM

## 2024-08-29 DIAGNOSIS — F32.4 MAJOR DEPRESSIVE DISORDER IN PARTIAL REMISSION, UNSPECIFIED WHETHER RECURRENT (H): ICD-10-CM

## 2024-08-29 DIAGNOSIS — Z11.59 SCREENING FOR VIRAL DISEASE: ICD-10-CM

## 2024-08-29 DIAGNOSIS — Z13.1 SCREENING FOR DIABETES MELLITUS: ICD-10-CM

## 2024-08-29 DIAGNOSIS — Z00.00 ENCOUNTER FOR MEDICARE ANNUAL WELLNESS EXAM: Primary | ICD-10-CM

## 2024-08-29 DIAGNOSIS — M17.0 PRIMARY OSTEOARTHRITIS OF BOTH KNEES: ICD-10-CM

## 2024-08-29 DIAGNOSIS — E78.5 HYPERLIPIDEMIA LDL GOAL <100: ICD-10-CM

## 2024-08-29 DIAGNOSIS — M76.62 ACHILLES TENDONITIS, BILATERAL: ICD-10-CM

## 2024-08-29 DIAGNOSIS — C50.411 MALIGNANT NEOPLASM OF UPPER-OUTER QUADRANT OF RIGHT BREAST IN FEMALE, ESTROGEN RECEPTOR POSITIVE (H): ICD-10-CM

## 2024-08-29 DIAGNOSIS — M76.61 ACHILLES TENDONITIS, BILATERAL: ICD-10-CM

## 2024-08-29 LAB
ALBUMIN SERPL BCG-MCNC: 4.1 G/DL (ref 3.5–5.2)
ALP SERPL-CCNC: 80 U/L (ref 40–150)
ALT SERPL W P-5'-P-CCNC: 14 U/L (ref 0–50)
ANION GAP SERPL CALCULATED.3IONS-SCNC: 9 MMOL/L (ref 7–15)
AST SERPL W P-5'-P-CCNC: 17 U/L (ref 0–45)
BILIRUB SERPL-MCNC: 0.6 MG/DL
BUN SERPL-MCNC: 16.1 MG/DL (ref 8–23)
CALCIUM SERPL-MCNC: 9.4 MG/DL (ref 8.8–10.4)
CHLORIDE SERPL-SCNC: 105 MMOL/L (ref 98–107)
CHOLEST SERPL-MCNC: 179 MG/DL
CREAT SERPL-MCNC: 0.85 MG/DL (ref 0.51–0.95)
EGFRCR SERPLBLD CKD-EPI 2021: 73 ML/MIN/1.73M2
FASTING STATUS PATIENT QL REPORTED: YES
FASTING STATUS PATIENT QL REPORTED: YES
GLUCOSE SERPL-MCNC: 102 MG/DL (ref 70–99)
HBA1C MFR BLD: 6.2 %
HCO3 SERPL-SCNC: 24 MMOL/L (ref 22–29)
HCV AB SERPL QL IA: NONREACTIVE
HDLC SERPL-MCNC: 74 MG/DL
LDLC SERPL CALC-MCNC: 95 MG/DL
NONHDLC SERPL-MCNC: 105 MG/DL
POTASSIUM SERPL-SCNC: 4.9 MMOL/L (ref 3.4–5.3)
PROT SERPL-MCNC: 6.7 G/DL (ref 6.4–8.3)
SODIUM SERPL-SCNC: 138 MMOL/L (ref 135–145)
TRIGL SERPL-MCNC: 50 MG/DL

## 2024-08-29 PROCEDURE — 86803 HEPATITIS C AB TEST: CPT | Performed by: INTERNAL MEDICINE

## 2024-08-29 PROCEDURE — 99000 SPECIMEN HANDLING OFFICE-LAB: CPT | Performed by: PATHOLOGY

## 2024-08-29 PROCEDURE — 36415 COLL VENOUS BLD VENIPUNCTURE: CPT | Performed by: PATHOLOGY

## 2024-08-29 PROCEDURE — 80053 COMPREHEN METABOLIC PANEL: CPT | Performed by: PATHOLOGY

## 2024-08-29 PROCEDURE — 80061 LIPID PANEL: CPT | Performed by: PATHOLOGY

## 2024-08-29 PROCEDURE — 83036 HEMOGLOBIN GLYCOSYLATED A1C: CPT | Performed by: INTERNAL MEDICINE

## 2024-08-29 PROCEDURE — G0439 PPPS, SUBSEQ VISIT: HCPCS | Performed by: INTERNAL MEDICINE

## 2024-08-29 RX ORDER — SIMVASTATIN 20 MG
20 TABLET ORAL AT BEDTIME
Qty: 90 TABLET | Refills: 3 | Status: SHIPPED | OUTPATIENT
Start: 2024-08-29

## 2024-08-29 RX ORDER — CITALOPRAM HYDROBROMIDE 40 MG/1
40 TABLET ORAL DAILY
Qty: 90 TABLET | Refills: 3 | Status: SHIPPED | OUTPATIENT
Start: 2024-08-29

## 2024-08-29 RX ORDER — BUPROPION HYDROCHLORIDE 150 MG/1
150 TABLET ORAL EVERY MORNING
Qty: 90 TABLET | Refills: 3 | Status: SHIPPED | OUTPATIENT
Start: 2024-08-29

## 2024-08-29 ASSESSMENT — PATIENT HEALTH QUESTIONNAIRE - PHQ9
SUM OF ALL RESPONSES TO PHQ QUESTIONS 1-9: 2
10. IF YOU CHECKED OFF ANY PROBLEMS, HOW DIFFICULT HAVE THESE PROBLEMS MADE IT FOR YOU TO DO YOUR WORK, TAKE CARE OF THINGS AT HOME, OR GET ALONG WITH OTHER PEOPLE: NOT DIFFICULT AT ALL
SUM OF ALL RESPONSES TO PHQ QUESTIONS 1-9: 2

## 2024-08-29 NOTE — PROGRESS NOTES
Preventive Care Visit  [facility]  Debi Pizarro MD, Internal Medicine  Aug 29, 2024      Assessment & Plan     Encounter for Medicare annual wellness exam  Routine health care reviewed and updated as appropriate. Counseling on health risks, including diet, exercise, substance use, sun protection, etc were discussed as relevant. Vaccinations due/eligible reviewed with patient and advised to get at pharmacy.    Achilles tendonitis, bilateral  Improved with OTC shoe inserts, will refer to orthotics  - Orthotics, Mastectomy and Custom Compression Orders    Need for vaccination  Discussed/advised shingles, RSV and COVID at pharmacy    Screening for diabetes mellitus  - Hemoglobin A1c; Future    Screening for viral disease  - Hepatitis C Screen Reflex to HCV RNA Quant and Genotype; Future    Tenosynovitis of hand  Working with ortho, s/p CSI    Hyperlipidemia LDL goal <100  - Lipid panel reflex to direct LDL Fasting; Future  - Comprehensive metabolic panel (BMP + Alb, Alk Phos, ALT, AST, Total. Bili, TP); Future    Major depressive disorder in partial remission, unspecified whether recurrent (H24)  STable on current meds    Osteopenia of multiple sites  Improved on BP    Primary osteoarthritis of both knees  Will schedule repeat injections this fall/winter    Malignant neoplasm of upper-outer quadrant of right breast in female, estrogen receptor positive (H)  Followed by Onc.                  No follow-ups on file.    Alonso Tello is a 69 year old, presenting for the following:  Medicare Visit        8/29/2024     7:47 AM   Additional Questions   Roomed by shlomo         Health Care Directive  Patient does not have a Health Care Directive or Living Will: Patient states has Advance Directive and will bring in a copy to clinic.    SASCHA Tello has PMH of R breast cancer dx 3/22, stage T1b, ER/NM+ Her neg, s/p lumpectomy, XRT and anastrozole, osteopenia on zometa,  depression/anxiety, tremor, wrist tenosynovitis, knee OA    Rough winter, mom  96 yo, horse , states mental health is holding up, still on celexa and wellbutrin    Dealing with R wrist tenosynovitis, seeing ortho, got a steroid injection last month but marginal benefits, she has also tried ibuprofen/bracing    Last injections were in December for knees, starting to hurt again, interested in getting more injections soon    Elisha reports tremors, onset more than 10 years ago, bilat hands, worse with action tremor, brothers have tremor, mom did, no family hx of parkinsons    Going to [facility] soon, wants to ride [place] horses    Routine Health Maintenence:  Lung Ca Screening (>30 pk age 55-79 or >20 py age 50-79 + RF): does not qualify  Colonoscopy (50-75 yrs): , , divertic, 10 year follow up  Dexa (>65W or 70M yrs):   Interval change  Based on historical least significant change data, bone density compared to the prior study has changed as follows:  - increased 10.3% at the lumbar spine L1-3, this change is significant  - increased 2.7% at the right total hip, this change is significant  - no significant change at the left total hip    Mammogram (40-75 yrs):   Pap (21-65 yrs): No results found for: PAP  Tob/EtOH: screen neg    Prema Palm is wife and decision maker              2024   General Health   How would you rate your overall physical health? Good   Feel stress (tense, anxious, or unable to sleep) Only a little      (!) STRESS CONCERN      2024   Nutrition   Diet: Regular (no restrictions)            2024   Exercise   Days per week of moderate/strenous exercise 4 days   Average minutes spent exercising at this level 40 min      Walk, bikes, horseback riding        2024   Social Factors   Frequency of gathering with friends or relatives Twice a week   Worry food won't last until get money to buy more No   Food not last or not have enough money for food? No   Do you  have housing? (Housing is defined as stable permanent housing and does not include staying ouside in a car, in a tent, in an abandoned building, in an overnight shelter, or couch-surfing.) Yes   Are you worried about losing your housing? No   Lack of transportation? No   Unable to get utilities (heat,electricity)? No            2024   Fall Risk   Reason Gait Speed Test Not Completed Patient declines   Gait Speed Test Interpretation Less than or equal to 5.00 seconds - PASS             2024   Activities of Daily Living- Home Safety   Needs help with the following daily activites None of the above   Safety concerns in the home None of the above            2024   Dental   Dentist two times every year? Yes            2024   Hearing Screening   Hearing concerns? None of the above            2024   Driving Risk Screening   Patient/family members have concerns about driving No            2024   General Alertness/Fatigue Screening   Have you been more tired than usual lately? No            2024   Urinary Incontinence Screening   Bothered by leaking urine in past 6 months No            2024   TB Screening   Were you born outside of the [place]? No          Today's PHQ-9 Score:       2024     7:43 AM   PHQ-9 SCORE   PHQ-9 Total Score MyChart 2 (Minimal depression)   PHQ-9 Total Score 2         2024   Substance Use   Alcohol more than 3/day or more than 7/wk No   Do you have a current opioid prescription? No   How severe/bad is pain from 1 to 10? 5/10   Do you use any other substances recreationally? No        Social History     Tobacco Use    Smoking status: Former     Current packs/day: 0.00     Average packs/day: 0.5 packs/day for 15.0 years (7.5 ttl pk-yrs)     Types: Cigarettes     Start date: 1972     Quit date: 1987     Years since quittin.2    Smokeless tobacco: Never   Vaping Use    Vaping status: Never Used   Substance Use Topics    Alcohol use: Yes      Alcohol/week: 3.0 standard drinks of alcohol     Types: 3 Glasses of wine per week     Comment: Social    Drug use: Never           12/18/2023   LAST FHS-7 RESULTS   1st degree relative breast or ovarian cancer No   Any relative bilateral breast cancer No   Any male have breast cancer No   Any ONE woman have BOTH breast AND ovarian cancer No   Any woman with breast cancer before 50yrs No   2 or more relatives with breast AND/OR ovarian cancer No   2 or more relatives with breast AND/OR bowel cancer No                 ASCVD Risk   The 10-year ASCVD risk score (Fran SÁNCHEZ, et al., 2019) is: 8.8%    Values used to calculate the score:      Age: 69 years      Sex: Female      Is Non- : No      Diabetic: No      Tobacco smoker: No      Systolic Blood Pressure: 130 mmHg      Is BP treated: No      HDL Cholesterol: 81 mg/dL      Total Cholesterol: 181 mg/dL            Reviewed and updated as needed this visit by Provider                      Current providers sharing in care for this patient include:  Patient Care Team:  Debi Pizarro MD as PCP - General (Internal Medicine)  Debi Pizarro MD as Assigned PCP  Lissy Broussard MD as MD (Dermatology)  Debi Pizarro MD as Referring Physician (Internal Medicine)  Esther Souza NP as Nurse Practitioner (ENT-Otolaryngology)  Dar Bill MD as Assigned Surgical Provider  Jossue Hoang MD as Assigned Musculoskeletal Provider  Gardenia Kaplan MD as Assigned Cancer Care Provider    The following health maintenance items are reviewed in Epic and correct as of today:  Health Maintenance   Topic Date Due    ADVANCE CARE PLANNING  Never done    DEPRESSION ACTION PLAN  Never done    HEPATITIS C SCREENING  Never done    ZOSTER IMMUNIZATION (1 of 2) Never done    RSV VACCINE (Pregnancy & 60+) (1 - 1-dose 60+ series) Never done    COVID-19 Vaccine (7 - 2023-24 season) 11/29/2023    MEDICARE ANNUAL WELLNESS VISIT  05/01/2024     "INFLUENZA VACCINE (1) 09/01/2024    LIPID  12/14/2024    ANNUAL REVIEW OF HM ORDERS  12/14/2024    MAMMO SCREENING  12/18/2024    PHQ-9  02/28/2025    FALL RISK ASSESSMENT  08/29/2025    GLUCOSE  02/20/2027    COLORECTAL CANCER SCREENING  04/10/2029    DTAP/TDAP/TD IMMUNIZATION (3 - Td or Tdap) 11/15/2031    DEXA  05/06/2039    Pneumococcal Vaccine: 65+ Years  Completed    HPV IMMUNIZATION  Aged Out    MENINGITIS IMMUNIZATION  Aged Out    RSV MONOCLONAL ANTIBODY  Aged Out            Objective    Exam  /86 (BP Location: Right arm, Patient Position: Sitting, Cuff Size: Adult Regular)   Pulse 67   Resp 14   Ht 1.651 m (5' 5\")   Wt 61.7 kg (136 lb)   SpO2 100%   Breastfeeding No   BMI 22.63 kg/m     Estimated body mass index is 22.63 kg/m  as calculated from the following:    Height as of this encounter: 1.651 m (5' 5\").    Weight as of this encounter: 61.7 kg (136 lb).    Physical Exam  Constitutional: Alert, oriented, pleasant, no acute distress  Head: Normocephalic, atraumatic  Eyes: Extra-ocular movements intact, no scleral icterus  ENT: Oropharynx clear, moist mucus membranes  Neck: Supple, no lymphadenopathy  Cardiovascular: Regular rate and rhythm, no murmurs, rubs or gallops, peripheral pulses full/symmetric  Respiratory: Good air movement bilaterally, lungs clear, no wheezes/rales/rhonchi  GI: Abdomen soft, bowel sounds present, nondistended, nontender, no organomegaly or masses, no rebound/guarding  Musculoskeletal: No edema, normal muscle tone, normal gait, swelling of R dorsal wrist  Neurologic: Alert and oriented, cranial nerves 2-12 intact, no focal deficits  Skin: No rashes/lesions  Psychiatric: normal mentation, affect and mood          8/29/2024   Mini Cog   Clock Draw Score 2 Normal   3 Item Recall 3 objects recalled   Mini Cog Total Score 5                 Signed Electronically by: Debi Pizarro MD    "

## 2024-09-16 ENCOUNTER — INFUSION THERAPY VISIT (OUTPATIENT)
Dept: ONCOLOGY | Facility: CLINIC | Age: 69
End: 2024-09-16
Attending: INTERNAL MEDICINE
Payer: MEDICARE

## 2024-09-16 ENCOUNTER — APPOINTMENT (OUTPATIENT)
Dept: LAB | Facility: CLINIC | Age: 69
End: 2024-09-16
Attending: INTERNAL MEDICINE
Payer: MEDICARE

## 2024-09-16 VITALS
OXYGEN SATURATION: 96 % | SYSTOLIC BLOOD PRESSURE: 143 MMHG | RESPIRATION RATE: 16 BRPM | WEIGHT: 138 LBS | HEART RATE: 75 BPM | TEMPERATURE: 98.3 F | BODY MASS INDEX: 22.96 KG/M2 | DIASTOLIC BLOOD PRESSURE: 85 MMHG

## 2024-09-16 DIAGNOSIS — Z17.0 MALIGNANT NEOPLASM OF UPPER-OUTER QUADRANT OF RIGHT BREAST IN FEMALE, ESTROGEN RECEPTOR POSITIVE (H): Primary | ICD-10-CM

## 2024-09-16 DIAGNOSIS — M85.89 OSTEOPENIA OF MULTIPLE SITES: ICD-10-CM

## 2024-09-16 DIAGNOSIS — C50.411 MALIGNANT NEOPLASM OF UPPER-OUTER QUADRANT OF RIGHT BREAST IN FEMALE, ESTROGEN RECEPTOR POSITIVE (H): Primary | ICD-10-CM

## 2024-09-16 LAB
ALBUMIN SERPL BCG-MCNC: 4.2 G/DL (ref 3.5–5.2)
CALCIUM SERPL-MCNC: 9.2 MG/DL (ref 8.8–10.4)
CALCIUM SERPL-MCNC: 9.4 MG/DL (ref 8.8–10.4)
CREAT SERPL-MCNC: 0.71 MG/DL (ref 0.51–0.95)
EGFRCR SERPLBLD CKD-EPI 2021: >90 ML/MIN/1.73M2

## 2024-09-16 PROCEDURE — 82310 ASSAY OF CALCIUM: CPT | Performed by: INTERNAL MEDICINE

## 2024-09-16 PROCEDURE — 96365 THER/PROPH/DIAG IV INF INIT: CPT

## 2024-09-16 PROCEDURE — 82040 ASSAY OF SERUM ALBUMIN: CPT | Performed by: INTERNAL MEDICINE

## 2024-09-16 PROCEDURE — 258N000003 HC RX IP 258 OP 636: Performed by: INTERNAL MEDICINE

## 2024-09-16 PROCEDURE — 82310 ASSAY OF CALCIUM: CPT

## 2024-09-16 PROCEDURE — 82565 ASSAY OF CREATININE: CPT | Performed by: INTERNAL MEDICINE

## 2024-09-16 PROCEDURE — 250N000011 HC RX IP 250 OP 636: Performed by: INTERNAL MEDICINE

## 2024-09-16 PROCEDURE — 36415 COLL VENOUS BLD VENIPUNCTURE: CPT | Performed by: INTERNAL MEDICINE

## 2024-09-16 RX ORDER — ZOLEDRONIC ACID 0.04 MG/ML
4 INJECTION, SOLUTION INTRAVENOUS ONCE
Status: COMPLETED | OUTPATIENT
Start: 2024-09-16 | End: 2024-09-16

## 2024-09-16 RX ADMIN — ZOLEDRONIC ACID 4 MG: 0.04 INJECTION, SOLUTION INTRAVENOUS at 11:43

## 2024-09-16 RX ADMIN — SODIUM CHLORIDE 250 ML: 9 INJECTION, SOLUTION INTRAVENOUS at 11:39

## 2024-09-16 ASSESSMENT — PAIN SCALES - GENERAL: PAINLEVEL: NO PAIN (0)

## 2024-09-16 NOTE — PATIENT INSTRUCTIONS
[facility] Triage and after hours / weekends / holidays:  [phone]    Please call the triage or after hours line if you experience a temperature greater than or equal to 100.4, shaking chills, have uncontrolled nausea, vomiting and/or diarrhea, dizziness, shortness of breath, chest pain, bleeding, unexplained bruising, or if you have any other new/concerning symptoms, questions or concerns.      If you are having any concerning symptoms or wish to speak to a provider before your next infusion visit, please call triage to notify them so we can adequately serve you.     If you need a refill on a narcotic prescription or other medication, please call before your infusion appointment.

## 2024-09-16 NOTE — PROGRESS NOTES
Infusion Nursing Note:  Elisha Gao presents today for Zometa Infusion.    Patient seen by provider today: No   present during visit today: Not Applicable.    Note: Patient reports feeling well on arrival to infusion suite today. Denies the following signs of infection: no fever, cough chills, rash, chest pain, shortness of breath, or diarrhea. Confirms taking calcium and vitamin D supplements as ordered and has no dental concerns. Wishes to proceed with treatment today.     Intravenous Access:  Peripheral IV placed.    Treatment Conditions:  Lab Results   Component Value Date     08/29/2024    POTASSIUM 4.9 08/29/2024    CR 0.71 09/16/2024    JOSE 9.4 09/16/2024    JOSE 9.2 09/16/2024    BILITOTAL 0.6 08/29/2024    ALBUMIN 4.2 09/16/2024    ALT 14 08/29/2024    AST 17 08/29/2024       Results reviewed, labs MET treatment parameters, ok to proceed with treatment.      Post Infusion Assessment:  Patient tolerated infusion without incident.  Blood return noted pre and post infusion.  Site patent and intact, free from redness, edema or discomfort.  No evidence of extravasations.  Access discontinued per protocol.       Discharge Plan:   Patient declined prescription refills.  Discharge instructions reviewed with: Patient.  Patient and/or family verbalized understanding of discharge instructions and all questions answered.  AVS to patient via [employer].  Patient will return in 6 months for next appointment.   Patient discharged in stable condition accompanied by: self.  Departure Mode: Ambulatory.      Carmelita Solitario RN

## 2024-09-16 NOTE — NURSING NOTE
Chief Complaint   Patient presents with    Blood Draw     Labs drawn via piv placed by RN in lab. VS taken.     Labs drawn from PIV placed by RN. Line flushed with saline. Vitals taken. Pt checked in for appointment(s).    Tawanna Peace RN

## 2024-10-28 ENCOUNTER — MYC REFILL (OUTPATIENT)
Dept: INTERNAL MEDICINE | Facility: CLINIC | Age: 69
End: 2024-10-28
Payer: MEDICARE

## 2024-10-28 DIAGNOSIS — F32.4 MAJOR DEPRESSIVE DISORDER IN PARTIAL REMISSION, UNSPECIFIED WHETHER RECURRENT (H): ICD-10-CM

## 2024-10-28 RX ORDER — CITALOPRAM HYDROBROMIDE 40 MG/1
40 TABLET ORAL DAILY
Qty: 90 TABLET | Refills: 3 | Status: CANCELLED | OUTPATIENT
Start: 2024-10-28

## 2024-10-28 RX ORDER — BUPROPION HYDROCHLORIDE 150 MG/1
150 TABLET ORAL EVERY MORNING
Qty: 90 TABLET | Refills: 3 | Status: CANCELLED | OUTPATIENT
Start: 2024-10-28

## 2024-10-29 ENCOUNTER — MYC MEDICAL ADVICE (OUTPATIENT)
Dept: INTERNAL MEDICINE | Facility: CLINIC | Age: 69
End: 2024-10-29
Payer: MEDICARE

## 2024-12-05 ENCOUNTER — OFFICE VISIT (OUTPATIENT)
Dept: INTERNAL MEDICINE | Facility: CLINIC | Age: 69
End: 2024-12-05
Payer: MEDICARE

## 2024-12-05 VITALS
HEIGHT: 65 IN | DIASTOLIC BLOOD PRESSURE: 86 MMHG | SYSTOLIC BLOOD PRESSURE: 127 MMHG | WEIGHT: 137.2 LBS | TEMPERATURE: 97.8 F | RESPIRATION RATE: 15 BRPM | OXYGEN SATURATION: 99 % | HEART RATE: 66 BPM | BODY MASS INDEX: 22.86 KG/M2

## 2024-12-05 DIAGNOSIS — M25.562 ARTHRALGIA OF BOTH LOWER LEGS: ICD-10-CM

## 2024-12-05 DIAGNOSIS — M25.561 ARTHRALGIA OF BOTH LOWER LEGS: ICD-10-CM

## 2024-12-05 DIAGNOSIS — M17.10 PRIMARY LOCALIZED OSTEOARTHROSIS OF LOWER LEG, UNSPECIFIED LATERALITY: Primary | ICD-10-CM

## 2024-12-05 DIAGNOSIS — Z29.11 NEED FOR VACCINATION AGAINST RESPIRATORY SYNCYTIAL VIRUS: ICD-10-CM

## 2024-12-05 RX ORDER — TRIAMCINOLONE ACETONIDE 40 MG/ML
80 INJECTION, SUSPENSION INTRA-ARTICULAR; INTRAMUSCULAR ONCE
Status: DISCONTINUED | OUTPATIENT
Start: 2024-12-05 | End: 2024-12-05

## 2024-12-05 RX ORDER — TRIAMCINOLONE ACETONIDE 40 MG/ML
40 INJECTION, SUSPENSION INTRA-ARTICULAR; INTRAMUSCULAR ONCE
Status: COMPLETED | OUTPATIENT
Start: 2024-12-05 | End: 2024-12-05

## 2024-12-05 RX ORDER — LIDOCAINE HYDROCHLORIDE 10 MG/ML
8 INJECTION, SOLUTION EPIDURAL; INFILTRATION; INTRACAUDAL; PERINEURAL ONCE
Status: DISCONTINUED | OUTPATIENT
Start: 2024-12-05 | End: 2024-12-05

## 2024-12-05 RX ADMIN — TRIAMCINOLONE ACETONIDE 40 MG: 40 INJECTION, SUSPENSION INTRA-ARTICULAR; INTRAMUSCULAR at 09:06

## 2024-12-05 RX ADMIN — TRIAMCINOLONE ACETONIDE 40 MG: 40 INJECTION, SUSPENSION INTRA-ARTICULAR; INTRAMUSCULAR at 09:05

## 2024-12-05 NOTE — PROGRESS NOTES
Assessment & Plan     Need for vaccination against respiratory syncytial virus    Primary localized osteoarthrosis of lower leg, unspecified laterality  - lidocaine 1 % 5 mL  - lidocaine 1 % 5 mL  - triamcinolone (KENALOG-40) injection 40 mg  - triamcinolone (KENALOG-40) injection 40 mg  - Large Joint/Bursa injection and/or drainage (Shoulder, Knee)    Arthralgia of both lower legs  - lidocaine 1 % 5 mL  - lidocaine 1 % 5 mL  - triamcinolone (KENALOG-40) injection 40 mg  - triamcinolone (KENALOG-40) injection 40 mg  - Large Joint/Bursa injection and/or drainage (Shoulder, Knee)                No follow-ups on file.    Subjective   Elisha is a 69 year old, presenting for the following health issues:  Follow Up      12/5/2024     8:05 AM   Additional Questions   Roomed by      History of Present Illness       Reason for visit:  Cortizone injections in both knees    She eats 2-3 servings of fruits and vegetables daily.She consumes 0 sweetened beverage(s) daily.She exercises with enough effort to increase her heart rate 20 to 29 minutes per day.  She exercises with enough effort to increase her heart rate 4 days per week.   She is taking medications regularly.     Patient here for repeat knee injections. Last ones performed 12/23. Experiences good relief, no complications, would like to repeat,.    PROCEDURE:  JOINT ASPIRATION/INJECTION.         After a discussion of risks, benefits and side effects of procedure, informed patient consent was obtained.       The bilateral knees was prepped and draped in the usual clean fashion (sterile not required for this procedure).      INJECTION:  Using 4 cc of 1% lidocaine mixed                           with 40 mg of Triamcinolone, the bilat knees were successfully injected                           without complication.  Patient did experience some pain                          relief following injection.    Debi Pizarro MD  Internal Medicine                  Objective    BP  "127/86 (BP Location: Right arm, Patient Position: Sitting, Cuff Size: Adult Regular)   Pulse 66   Temp 97.8  F (36.6  C) (Oral)   Resp 15   Ht 1.651 m (5' 5\")   Wt 62.2 kg (137 lb 3.2 oz)   SpO2 99%   BMI 22.83 kg/m    Body mass index is 22.83 kg/m .  Physical Exam   GENERAL: alert and no distress  MS: no gross musculoskeletal defects noted, no edema  PSYCH: mentation appears normal, affect normal/bright            Signed Electronically by: Debi Pizarro MD    "

## 2025-01-04 RX ORDER — HEPARIN SODIUM,PORCINE 10 UNIT/ML
5 VIAL (ML) INTRAVENOUS
OUTPATIENT
Start: 2025-03-15

## 2025-01-04 RX ORDER — HEPARIN SODIUM (PORCINE) LOCK FLUSH IV SOLN 100 UNIT/ML 100 UNIT/ML
5 SOLUTION INTRAVENOUS
OUTPATIENT
Start: 2025-03-15

## 2025-01-04 RX ORDER — ZOLEDRONIC ACID 0.04 MG/ML
4 INJECTION, SOLUTION INTRAVENOUS ONCE
OUTPATIENT
Start: 2025-03-15 | End: 2025-03-15

## 2025-01-04 NOTE — PROGRESS NOTES
Oncology Visit:  Date on this visit: Jan 6, 2025    Diagnosis: right breast cancer.    Primary Physician: Debi Pizarro     History Of Present Illness:  Ms. Gao is a 69 year old female with right breast cancer.  Routine screening mammogram on 3/17/2022 showed an asymmetry in the upper outer right breast.  Diagnostic breast imaging confirmed a 4 mm spiculated mass at 11:00, 4 cm from the nipple of the right breast.  Biopsy was consistent with a grade 2 invasive ductal carcinoma, ER strong in 98%, PA moderate in 40%, and HER2 negative by FISH and atypical ductal hyperplasia.  Patient underwent right breast lumpectomy and right axillary sentinel lymph node biopsy under the care of Dr. Garcia on 4/11/2022.  Pathology showed a grade 2 invasive ductal carcinoma measuring 9 mm with associated low grade DCIS.  Surgical margins were negative.  There was no lymphovascular invasion and two sentinel lymph nodes were benign.  She completed radiation, 2600 cGy in 5 fractions, to the right breast on 5/25/2022.  She has been on anastrozole since 6/15/2022.    Interval History:   Ms. Gao comes in the clinic today for an on treatment visit.  She is on adjuvant curative intent treatment with anastrozole.  She tolerates the medication remarkably well.  She also had bilateral screening mammograms performed prior to her visit today.  She denies concerning breast lumps, pain, or swelling.  Her health has been good since her last visit she has had no hospitalizations, surgeries, or even emergency department visits.  She has no current fevers, chills, cough, or symptoms of infection.  She has no new bone or joint aches or pains.  She denies cough, shortness of breath, or chest pain.  She has no abdominal complaints.  She recently was able to take a 5-day trip to [facility] which she rather enjoyed.  She has an upcoming trip to [place] where they plan to golf.    Past Medical/Surgical History:  Past Medical History:   Diagnosis Date     Breast cancer (H) 03/2022    Chronic low back pain     for a couple years, worse at night, not evaluated    Depression     Essential tremor     Invasive ductal carcinoma of breast, female, right (H)     osteopenia     FRAX  11/2%   2022   Colonoscopy in 2012 w/o polyps, next due in 2022.    Past Surgical History:   Procedure Laterality Date    ABDOMEN SURGERY  1987    BREAST SURGERY      COLONOSCOPY  2020    GYN SURGERY  1987    HYSTERECTOMY  1987    endometriosis, ovarian cysts, hormones x 10  years    LASIK Bilateral     LUMPECTOMY, BREAST, LOCALIZED USING RADIOFREQUENCY IDENTIFICATION Right 04/11/2022    Procedure: LUMPECTOMY, BREAST, LOCALIZED USING RADIOFREQUENCY IDENTIFICATION, sentinel lymph node biopsy;  Surgeon: Santy Garcia MD;  Location: [facility] OR     Allergies:  Allergies as of 01/06/2025 - Reviewed 12/05/2024   Allergen Reaction Noted    Alendronate Nausea and Vomiting 05/26/2010    Raloxifene Nausea 05/26/2010    Risedronate Nausea and Vomiting 05/26/2010     Current Medications:  Current Outpatient Medications   Medication Sig Dispense Refill    anastrozole (ARIMIDEX) 1 MG tablet Take 1 tablet (1 mg) by mouth daily 90 tablet 3    buPROPion (WELLBUTRIN XL) 150 MG 24 hr tablet Take 1 tablet (150 mg) by mouth every morning. 90 tablet 3    citalopram (CELEXA) 40 MG tablet Take 1 tablet (40 mg) by mouth daily. 90 tablet 3    magnesium 250 MG tablet Take 1 tablet by mouth every morning       ondansetron (ZOFRAN ODT) 4 MG ODT tab Take 1 tablet (4 mg) by mouth every 8 hours as needed for nausea 18 tablet 0    simvastatin (ZOCOR) 20 MG tablet Take 1 tablet (20 mg) by mouth at bedtime. 90 tablet 3    Vitamin D, Cholecalciferol, 25 MCG (1000 UT) TABS Take 3 tablets by mouth every morning         Family and Social History:  See initial consultation dated 4/4/2022 for further details.  Breast Actionable panel on 4/4/2022 was negative for germline genetic mutation.    Physical Exam:    General:  Well appearing  adult female in NAD.  Alert and oriented x 3.  HEENT:  Normocephalic.  Sclera anicteric.    Lymph:  No palpable cervical, supraclavicular, or axillary LAD.  Chest:  CTAB, normal work of breathing   Breast:  Right axillary and right upper outer breast incision are well healed. There is some fibrosis along the upper outer lumpectomy site unchanged from prior. There are no discretely palpable masses in either breast.  Bilateral nipples are everted.   Abd:  Soft/ND/NT   Ext:  No edema of the bilateral lower extremities.   Neuro:  Cranial nerves grossly intact.  Gait stable.  Musculo:  Right hand swelling, in a brace.  Psych:  Mood and affect appear normal.  Skin:  No visible concerning skin rashes or lesions.    Laboratory/Imaging Studies  I personally reviewed the below images while in clinic today:    1/6/2025 Bilateral screening mammograms:  Findings: There are scattered areas of fibroglandular density.  Right breast conservation therapy changes.  There is no radiographic evidence of malignancy.                                                                    IMPRESSION: ACR BI-RADS Category 2: Benign    ASSESSMENT/PLAN:  68 yo female with stage Ia, [id number], grade 2, ER/WA positive, HER-2 negative invasive ductal carcinoma of the right breast.   She is s/p right breast lumpectomy and right axillary sentinel lymph node procedure.    1.  Right breast cancer:  Ms. Gao is 2 years, 9 months out from excision of a right breast cancer.  She is on adjuvant curative intent treatment with anastrozole. She is tolerating it well. Plan is to treat with a total 5 years of endocrine therapy (through 6/15/2027).    There are no concerning signs or symptoms of recurrence today.   - I personally reviewed the images of the bilateral screening mammograms performed today which are without new or concerning findings compared to prior.  - Return to clinic in 6 months.    2.  Osteopenia:  DEXA bone density scan 5/6/2024 with lowest  T-score of -2.1 c/w osteopenia.  She is at risk of osteoporosis on anastrozole therapy.  - Zometa 4 mg IV once every 6 months for 3 years, to prevent bone loss on aromatase inhibitor therapy and also reduce the risk of breast cancer bone metastasis was started on 9/14/2022.    - C6 Zometa due around 3/6/2025.  This will be her last planned cycle.  - Take calcium 1200 mg and vitamin D 1000 IUs daily  - Walk 30 minutes daily.    3.  Follow Up:  Labs and Zometa infusion around 3/6/2025.  Return to clinic in 6 months.

## 2025-01-06 ENCOUNTER — ONCOLOGY VISIT (OUTPATIENT)
Dept: ONCOLOGY | Facility: CLINIC | Age: 70
End: 2025-01-06
Attending: INTERNAL MEDICINE
Payer: MEDICARE

## 2025-01-06 ENCOUNTER — ANCILLARY PROCEDURE (OUTPATIENT)
Dept: MAMMOGRAPHY | Facility: CLINIC | Age: 70
End: 2025-01-06
Attending: INTERNAL MEDICINE
Payer: MEDICARE

## 2025-01-06 VITALS
SYSTOLIC BLOOD PRESSURE: 132 MMHG | DIASTOLIC BLOOD PRESSURE: 90 MMHG | WEIGHT: 134.5 LBS | RESPIRATION RATE: 12 BRPM | TEMPERATURE: 97.9 F | BODY MASS INDEX: 22.38 KG/M2 | HEART RATE: 69 BPM | OXYGEN SATURATION: 97 %

## 2025-01-06 DIAGNOSIS — Z17.0 MALIGNANT NEOPLASM OF UPPER-OUTER QUADRANT OF RIGHT BREAST IN FEMALE, ESTROGEN RECEPTOR POSITIVE (H): Primary | ICD-10-CM

## 2025-01-06 DIAGNOSIS — Z12.31 VISIT FOR SCREENING MAMMOGRAM: ICD-10-CM

## 2025-01-06 DIAGNOSIS — M85.89 OSTEOPENIA OF MULTIPLE SITES: ICD-10-CM

## 2025-01-06 DIAGNOSIS — C50.411 MALIGNANT NEOPLASM OF UPPER-OUTER QUADRANT OF RIGHT BREAST IN FEMALE, ESTROGEN RECEPTOR POSITIVE (H): Primary | ICD-10-CM

## 2025-01-06 PROCEDURE — 77067 SCR MAMMO BI INCL CAD: CPT | Performed by: STUDENT IN AN ORGANIZED HEALTH CARE EDUCATION/TRAINING PROGRAM

## 2025-01-06 PROCEDURE — G0463 HOSPITAL OUTPT CLINIC VISIT: HCPCS | Performed by: INTERNAL MEDICINE

## 2025-01-06 PROCEDURE — 77063 BREAST TOMOSYNTHESIS BI: CPT | Performed by: STUDENT IN AN ORGANIZED HEALTH CARE EDUCATION/TRAINING PROGRAM

## 2025-01-06 ASSESSMENT — PAIN SCALES - GENERAL: PAINLEVEL_OUTOF10: NO PAIN (0)

## 2025-01-06 NOTE — Clinical Note
1/6/2025      Elisha Gao  [address]  [facility] [address]      Dear Colleague,    Thank you for referring your patient, Elisha Gao, to the [facility]. Please see a copy of my visit note below.    Oncology Visit:  Date on this visit: Jan 6, 2025    Diagnosis: right breast cancer.    Primary Physician: Debi Pizarro     History Of Present Illness:  Ms. Gao is a 69 year old female with right breast cancer.  Routine screening mammogram on 3/17/2022 showed an asymmetry in the upper outer right breast.  Diagnostic breast imaging confirmed a 4 mm spiculated mass at 11:00, 4 cm from the nipple of the right breast.  Biopsy was consistent with a grade 2 invasive ductal carcinoma, ER strong in 98%, ME moderate in 40%, and HER2 negative by FISH and atypical ductal hyperplasia.  Patient underwent right breast lumpectomy and right axillary sentinel lymph node biopsy under the care of Dr. Garcia on 4/11/2022.  Pathology showed a grade 2 invasive ductal carcinoma measuring 9 mm with associated low grade DCIS.  Surgical margins were negative.  There was no lymphovascular invasion and two sentinel lymph nodes were benign.  She completed radiation, 2600 cGy in 5 fractions, to the right breast on 5/25/2022.  She has been on anastrozole since 6/15/2022.    Interval History:       Past Medical/Surgical History:  Past Medical History:   Diagnosis Date    Breast cancer (H) 03/2022    Chronic low back pain     for a couple years, worse at night, not evaluated    Depression     Essential tremor     Invasive ductal carcinoma of breast, female, right (H)     osteopenia     FRAX  11/2%   2022   Colonoscopy in 2012 w/o polyps, next due in 2022.    Past Surgical History:   Procedure Laterality Date    ABDOMEN SURGERY  1987    BREAST SURGERY      COLONOSCOPY  2020    GYN SURGERY  1987    HYSTERECTOMY  1987    endometriosis, ovarian cysts, hormones x 10  years    LASIK Bilateral     LUMPECTOMY,  BREAST, LOCALIZED USING RADIOFREQUENCY IDENTIFICATION Right 04/11/2022    Procedure: LUMPECTOMY, BREAST, LOCALIZED USING RADIOFREQUENCY IDENTIFICATION, sentinel lymph node biopsy;  Surgeon: Santy Garcia MD;  Location: [facility] OR     Allergies:  Allergies as of 01/06/2025 - Reviewed 12/05/2024   Allergen Reaction Noted    Alendronate Nausea and Vomiting 05/26/2010    Raloxifene Nausea 05/26/2010    Risedronate Nausea and Vomiting 05/26/2010     Current Medications:  Current Outpatient Medications   Medication Sig Dispense Refill    anastrozole (ARIMIDEX) 1 MG tablet Take 1 tablet (1 mg) by mouth daily 90 tablet 3    buPROPion (WELLBUTRIN XL) 150 MG 24 hr tablet Take 1 tablet (150 mg) by mouth every morning. 90 tablet 3    citalopram (CELEXA) 40 MG tablet Take 1 tablet (40 mg) by mouth daily. 90 tablet 3    magnesium 250 MG tablet Take 1 tablet by mouth every morning       ondansetron (ZOFRAN ODT) 4 MG ODT tab Take 1 tablet (4 mg) by mouth every 8 hours as needed for nausea 18 tablet 0    simvastatin (ZOCOR) 20 MG tablet Take 1 tablet (20 mg) by mouth at bedtime. 90 tablet 3    Vitamin D, Cholecalciferol, 25 MCG (1000 UT) TABS Take 3 tablets by mouth every morning         Family and Social History:  See initial consultation dated 4/4/2022 for further details.  Breast Actionable panel on 4/4/2022 was negative for germline genetic mutation.    Physical Exam:    General:  Well appearing adult female in NAD.  Alert and oriented x 3.  HEENT:  Normocephalic.  Sclera anicteric.    Lymph:  No palpable cervical, supraclavicular, or axillary LAD.  Chest:  CTAB, normal work of breathing   Breast:  Right axillary and right upper outer breast incision are well healed. There is some fibrosis along the upper outer lumpectomy site unchanged from prior. There are no discretely palpable masses in either breast.  Bilateral nipples are everted.   Abd:  Soft/ND/NT   Ext:  No edema of the bilateral lower extremities.   Neuro:  Cranial  nerves grossly intact.  Gait stable.  Musculo:  Right hand swelling, in a brace.  Psych:  Mood and affect appear normal.  Skin:  No visible concerning skin rashes or lesions.    Laboratory/Imaging Studies  I personally reviewed the below images while in clinic today:    1/6/2025 Bilateral screening mammograms:      ASSESSMENT/PLAN:  70 yo female with stage Ia, [id number], grade 2, ER/VT positive, HER-2 negative invasive ductal carcinoma of the right breast.   She is s/p right breast lumpectomy and right axillary sentinel lymph node procedure.    1.  Right breast cancer:  Ms. Gao is 2 years, 9 months out from excision of a right breast cancer.  She is on adjuvant curative intent treatment with anastrozole. She is tolerating it well. Plan is to treat with a total 5 years of endocrine therapy (through 6/15/2027).    There are no concerning signs or symptoms of recurrence today.   - I personally reviewed the images of the bilateral screening mammograms performed today ...  - Return to clinic in 6 months.    2.  Osteopenia:  DEXA bone density scan 5/6/2024 with lowest T-score of -2.1 c/w osteopenia.  She is at risk of osteoporosis on anastrozole therapy.  - Zometa 4 mg IV once every 6 months for 3 years, to prevent bone loss on aromatase inhibitor therapy and also reduce the risk of breast cancer bone metastasis was started on 9/14/2022.    - C6 Zometa due around 3/6/2025.  This will be her last planned cycle.  - Take calcium 1200 mg and vitamin D 1000 IUs daily  - Walk 30 minutes daily.    3.  Follow Up:  Labs and Zometa infusion around 3/6/2025.  Return to clinic in 6 months.        Oncology Visit:  Date on this visit: Jan 6, 2025    Diagnosis: right breast cancer.    Primary Physician: Debi Pizarro     History Of Present Illness:  Ms. Gao is a 69 year old female with right breast cancer.  Routine screening mammogram on 3/17/2022 showed an asymmetry in the upper outer right breast.  Diagnostic breast imaging  confirmed a 4 mm spiculated mass at 11:00, 4 cm from the nipple of the right breast.  Biopsy was consistent with a grade 2 invasive ductal carcinoma, ER strong in 98%, RI moderate in 40%, and HER2 negative by FISH and atypical ductal hyperplasia.  Patient underwent right breast lumpectomy and right axillary sentinel lymph node biopsy under the care of Dr. Garcia on 4/11/2022.  Pathology showed a grade 2 invasive ductal carcinoma measuring 9 mm with associated low grade DCIS.  Surgical margins were negative.  There was no lymphovascular invasion and two sentinel lymph nodes were benign.  She completed radiation, 2600 cGy in 5 fractions, to the right breast on 5/25/2022.  She has been on anastrozole since 6/15/2022.    Interval History:       Past Medical/Surgical History:  Past Medical History:   Diagnosis Date     Breast cancer (H) 03/2022     Chronic low back pain     for a couple years, worse at night, not evaluated     Depression      Essential tremor      Invasive ductal carcinoma of breast, female, right (H)      osteopenia     FRAX  11/2%   2022   Colonoscopy in 2012 w/o polyps, next due in 2022.    Past Surgical History:   Procedure Laterality Date     ABDOMEN SURGERY  1987     BREAST SURGERY       COLONOSCOPY  2020     GYN SURGERY  1987     HYSTERECTOMY  1987    endometriosis, ovarian cysts, hormones x 10  years     LASIK Bilateral      LUMPECTOMY, BREAST, LOCALIZED USING RADIOFREQUENCY IDENTIFICATION Right 04/11/2022    Procedure: LUMPECTOMY, BREAST, LOCALIZED USING RADIOFREQUENCY IDENTIFICATION, sentinel lymph node biopsy;  Surgeon: Santy Garcia MD;  Location: [facility] OR     Allergies:  Allergies as of 01/06/2025 - Reviewed 12/05/2024   Allergen Reaction Noted     Alendronate Nausea and Vomiting 05/26/2010     Raloxifene Nausea 05/26/2010     Risedronate Nausea and Vomiting 05/26/2010     Current Medications:  Current Outpatient Medications   Medication Sig Dispense Refill     anastrozole (ARIMIDEX) 1 MG  tablet Take 1 tablet (1 mg) by mouth daily 90 tablet 3     buPROPion (WELLBUTRIN XL) 150 MG 24 hr tablet Take 1 tablet (150 mg) by mouth every morning. 90 tablet 3     citalopram (CELEXA) 40 MG tablet Take 1 tablet (40 mg) by mouth daily. 90 tablet 3     magnesium 250 MG tablet Take 1 tablet by mouth every morning        ondansetron (ZOFRAN ODT) 4 MG ODT tab Take 1 tablet (4 mg) by mouth every 8 hours as needed for nausea 18 tablet 0     simvastatin (ZOCOR) 20 MG tablet Take 1 tablet (20 mg) by mouth at bedtime. 90 tablet 3     Vitamin D, Cholecalciferol, 25 MCG (1000 UT) TABS Take 3 tablets by mouth every morning         Family and Social History:  See initial consultation dated 4/4/2022 for further details.  Breast Actionable panel on 4/4/2022 was negative for germline genetic mutation.    Physical Exam:    General:  Well appearing adult female in NAD.  Alert and oriented x 3.  HEENT:  Normocephalic.  Sclera anicteric.    Lymph:  No palpable cervical, supraclavicular, or axillary LAD.  Chest:  CTAB, normal work of breathing   Breast:  Right axillary and right upper outer breast incision are well healed. There is some fibrosis along the upper outer lumpectomy site unchanged from prior. There are no discretely palpable masses in either breast.  Bilateral nipples are everted.   Abd:  Soft/ND/NT   Ext:  No edema of the bilateral lower extremities.   Neuro:  Cranial nerves grossly intact.  Gait stable.  Musculo:  Right hand swelling, in a brace.  Psych:  Mood and affect appear normal.  Skin:  No visible concerning skin rashes or lesions.    Laboratory/Imaging Studies  I personally reviewed the below images while in clinic today:    1/6/2025 Bilateral screening mammograms:  Findings: There are scattered areas of fibroglandular density.  Right breast conservation therapy changes.  There is no radiographic evidence of malignancy.                                                                    IMPRESSION: ACR BI-RADS  Category 2: Benign    ASSESSMENT/PLAN:  70 yo female with stage Ia, [id number], grade 2, ER/IL positive, HER-2 negative invasive ductal carcinoma of the right breast.   She is s/p right breast lumpectomy and right axillary sentinel lymph node procedure.    1.  Right breast cancer:  Ms. Gao is 2 years, 9 months out from excision of a right breast cancer.  She is on adjuvant curative intent treatment with anastrozole. She is tolerating it well. Plan is to treat with a total 5 years of endocrine therapy (through 6/15/2027).    There are no concerning signs or symptoms of recurrence today.   - I personally reviewed the images of the bilateral screening mammograms performed today which are without new or concerning findings compared to prior.  - Return to clinic in 6 months.    2.  Osteopenia:  DEXA bone density scan 5/6/2024 with lowest T-score of -2.1 c/w osteopenia.  She is at risk of osteoporosis on anastrozole therapy.  - Zometa 4 mg IV once every 6 months for 3 years, to prevent bone loss on aromatase inhibitor therapy and also reduce the risk of breast cancer bone metastasis was started on 9/14/2022.    - C6 Zometa due around 3/6/2025.  This will be her last planned cycle.  - Take calcium 1200 mg and vitamin D 1000 IUs daily  - Walk 30 minutes daily.    3.  Follow Up:  Labs and Zometa infusion around 3/6/2025.  Return to clinic in 6 months.          Again, thank you for allowing me to participate in the care of your patient.        Sincerely,        Gardenia Kaplan MD    Electronically signed

## 2025-01-06 NOTE — NURSING NOTE
"Oncology Rooming Note    January 6, 2025 10:25 AM   Elisha Gao is a 69 year old female who presents for:    Chief Complaint   Patient presents with    Oncology Clinic Visit     RTN Malignant neoplasm of upper outer quadrant of right breast      Initial Vitals: BP (!) 132/90 (BP Location: Right arm, Patient Position: Sitting, Cuff Size: Adult Regular)   Pulse 69   Temp 97.9  F (36.6  C) (Oral)   Resp 12   Wt 61 kg (134 lb 8 oz)   SpO2 97%   BMI 22.38 kg/m   Estimated body mass index is 22.38 kg/m  as calculated from the following:    Height as of 12/5/24: 1.651 m (5' 5\").    Weight as of this encounter: 61 kg (134 lb 8 oz). Body surface area is 1.67 meters squared.  No Pain (0) Comment: Data Unavailable   No LMP recorded. Patient has had a hysterectomy.  Allergies reviewed: Yes  Medications reviewed: Yes    Medications: Medication refills not needed today.  Pharmacy name entered into [facility]:    [facility] [address]    Frailty Screening:   Is the patient here for a new oncology consult visit in cancer care? 2. No      Clinical concerns: none      Norbert Peace             "

## 2025-01-15 ENCOUNTER — MYC MEDICAL ADVICE (OUTPATIENT)
Dept: INTERNAL MEDICINE | Facility: CLINIC | Age: 70
End: 2025-01-15
Payer: MEDICARE

## 2025-03-04 ENCOUNTER — INFUSION THERAPY VISIT (OUTPATIENT)
Dept: TRANSPLANT | Facility: CLINIC | Age: 70
End: 2025-03-04
Attending: INTERNAL MEDICINE
Payer: MEDICARE

## 2025-03-04 VITALS
HEART RATE: 63 BPM | SYSTOLIC BLOOD PRESSURE: 126 MMHG | BODY MASS INDEX: 22.55 KG/M2 | RESPIRATION RATE: 16 BRPM | TEMPERATURE: 98 F | DIASTOLIC BLOOD PRESSURE: 82 MMHG | WEIGHT: 135.5 LBS | OXYGEN SATURATION: 100 %

## 2025-03-04 DIAGNOSIS — Z17.0 MALIGNANT NEOPLASM OF UPPER-OUTER QUADRANT OF RIGHT BREAST IN FEMALE, ESTROGEN RECEPTOR POSITIVE (H): Primary | ICD-10-CM

## 2025-03-04 DIAGNOSIS — C50.411 MALIGNANT NEOPLASM OF UPPER-OUTER QUADRANT OF RIGHT BREAST IN FEMALE, ESTROGEN RECEPTOR POSITIVE (H): Primary | ICD-10-CM

## 2025-03-04 LAB
ALBUMIN SERPL BCG-MCNC: 4.1 G/DL (ref 3.5–5.2)
CALCIUM SERPL-MCNC: 8.9 MG/DL (ref 8.8–10.4)
CREAT SERPL-MCNC: 0.92 MG/DL (ref 0.51–0.95)
EGFRCR SERPLBLD CKD-EPI 2021: 67 ML/MIN/1.73M2

## 2025-03-04 PROCEDURE — 36415 COLL VENOUS BLD VENIPUNCTURE: CPT | Performed by: INTERNAL MEDICINE

## 2025-03-04 PROCEDURE — 96365 THER/PROPH/DIAG IV INF INIT: CPT

## 2025-03-04 PROCEDURE — 82565 ASSAY OF CREATININE: CPT | Performed by: INTERNAL MEDICINE

## 2025-03-04 PROCEDURE — 82310 ASSAY OF CALCIUM: CPT | Performed by: INTERNAL MEDICINE

## 2025-03-04 PROCEDURE — 250N000011 HC RX IP 250 OP 636: Performed by: INTERNAL MEDICINE

## 2025-03-04 PROCEDURE — 82040 ASSAY OF SERUM ALBUMIN: CPT | Performed by: INTERNAL MEDICINE

## 2025-03-04 RX ORDER — ZOLEDRONIC ACID 0.04 MG/ML
3.5 INJECTION, SOLUTION INTRAVENOUS ONCE
Status: COMPLETED | OUTPATIENT
Start: 2025-03-04 | End: 2025-03-04

## 2025-03-04 RX ADMIN — ZOLEDRONIC ACID 3.5 MG: 0.04 INJECTION, SOLUTION INTRAVENOUS at 10:33

## 2025-03-04 ASSESSMENT — PAIN SCALES - GENERAL: PAINLEVEL_OUTOF10: NO PAIN (0)

## 2025-03-04 NOTE — NURSING NOTE
Chief Complaint   Patient presents with    Blood Draw     Labs drawn via PIV     Labs drawn from PIV placed by RN. Line flushed with saline. Vitals taken. Pt checked in for appointment(s).     Tita BUSTAMANTE RN PHN BSN  BMT/Oncology Lab

## 2025-03-04 NOTE — PROGRESS NOTES
Infusion Nursing Note:  Elisha Gao presents today for zometa infusion.    Patient seen by provider today: No   present during visit today: Not Applicable.    Note: Pt presented to infusion clinic today feeling well. Meds and allergies were reviewed. Labs monitored, pt met parameters for treatment. Pt received Zometa treatment without incident and discharge home.        Intravenous Access:  Peripheral IV placed.    Treatment Conditions:  Lab Results   Component Value Date     08/29/2024    POTASSIUM 4.9 08/29/2024    CR 0.92 03/04/2025    JOSE 8.9 03/04/2025    BILITOTAL 0.6 08/29/2024    ALBUMIN 4.1 03/04/2025    ALT 14 08/29/2024    AST 17 08/29/2024         Post Infusion Assessment:  Patient tolerated infusion without incident.       Discharge Plan:   Patient discharged in stable condition accompanied by: self.  Departure Mode: Ambulatory.      Drea Wiley RN

## 2025-04-15 DIAGNOSIS — M25.561 RIGHT KNEE PAIN, UNSPECIFIED CHRONICITY: Primary | ICD-10-CM

## 2025-04-16 NOTE — TELEPHONE ENCOUNTER
DIAGNOSIS: Right knee pain, Self, Medicare     APPOINTMENT DATE: 4.17.25   NOTES STATUS DETAILS   OFFICE NOTE from other specialist Internal 12.14.23, 5.1.23  [place]  IM   MEDICATION LIST Internal    XRAYS (IMAGES & REPORTS) Internal *sched* for 4.17.25  XR Knee Right    5.1.23  XR Knee AP Stand [place]

## 2025-04-16 NOTE — PROGRESS NOTES
[facility]  Sports Medicine Clinic  Clinics and Surgery Center           SUBJECTIVE       Elisha Gao is a 69 year old female presenting to clinic today for right knee. She endorse anterior knee pain today for about 1 year.     Background:   Occupation:   Hand Dominance (If pertinent): NA     Injury (Y/N): No   Work Comp (Y/N): No  Date of injury: NA   Mechanism of Injury: No KEL     Duration of symptoms: 1 year    Intensity (1-10): 6/10    Aggravating factors: Horse back riding and walking    Relieving Factors: CSI about a year ago, she also had one 4 months ago without relief.    Prior Evaluation: No    Previous Surgery on the area (Y/N): No    Physical Therapy (Previous/Current/None): No     Physical Activity/Exercise (What, How Often): Horseback riding, and walking, hiking. 4x a week       PMH, Medications and Allergies were reviewed and updated as needed.    ROS:  As noted above otherwise negative.    Patient Active Problem List   Diagnosis    Major depressive disorder in partial remission, unspecified whether recurrent    Invasive ductal carcinoma of breast, female, right (H)    Malignant neoplasm of upper-outer quadrant of right breast in female, estrogen receptor positive (H)    Osteopenia of multiple sites    Right wrist pain       Current Outpatient Medications   Medication Sig Dispense Refill    anastrozole (ARIMIDEX) 1 MG tablet Take 1 tablet (1 mg) by mouth daily 90 tablet 3    buPROPion (WELLBUTRIN XL) 150 MG 24 hr tablet Take 1 tablet (150 mg) by mouth every morning. 90 tablet 3    citalopram (CELEXA) 40 MG tablet Take 1 tablet (40 mg) by mouth daily. 90 tablet 3    magnesium 250 MG tablet Take 1 tablet by mouth every morning       ondansetron (ZOFRAN ODT) 4 MG ODT tab Take 1 tablet (4 mg) by mouth every 8 hours as needed for nausea 18 tablet 0    simvastatin (ZOCOR) 20 MG tablet Take 1 tablet (20 mg) by mouth at bedtime. 90 tablet 3    Vitamin D, Cholecalciferol, 25 MCG (1000 UT) TABS  Take 3 tablets by mouth every morning       diclofenac (VOLTAREN) 50 MG EC tablet Take 1 tablet (50 mg) by mouth 2 times daily as needed for moderate pain. 60 tablet 0            OBJECTIVE:       Vitals: There were no vitals filed for this visit.  BMI: There is no height or weight on file to calculate BMI.    Gen:  Well nourished and in no acute distress  HEENT: Extraocular movement intact  Neck: Supple  Pulm:  Breathing Comfortably. No increased respiratory effort.  Psych: Euthymic. Appropriately answers questions    MSK: Right knee without evidence of an effusion.  Full range of motion.  Joint line tenderness of the medial aspect, as well as patellar tendon pain in the mid substance.  Full range of motion with crepitus.  Strength is appropriate.  Negative anterior drawer and Lachman.  Negative posterior drawer.  Negative varus and valgus stress testing.  Negative Apley grind.  Positive Thessaly to the anterior knee.      XRAY : I personally reviewed the x-rays of the bilateral knees the patient does have bilateral medial compartment degenerative changes.  What appears to be a calcification within the tibial collateral ligament is apparent as well.          ASSESSMENT and PLAN:     Elisha was seen today for pain.    Diagnoses and all orders for this visit:    Patellar tendinosis  -     Physical Therapy  Referral; Future  -     diclofenac (VOLTAREN) 50 MG EC tablet; Take 1 tablet (50 mg) by mouth 2 times daily as needed for moderate pain.    Primary osteoarthritis of right knee  -     Physical Therapy  Referral; Future  -     diclofenac (VOLTAREN) 50 MG EC tablet; Take 1 tablet (50 mg) by mouth 2 times daily as needed for moderate pain.    Primary osteoarthritis of left knee  -     Physical Therapy  Referral; Future  -     diclofenac (VOLTAREN) 50 MG EC tablet; Take 1 tablet (50 mg) by mouth 2 times daily as needed for moderate pain.      Elisha is a 69-year-old female presenting to clinic  at the recommendation of her primary care physician today for right knee pain.  Elisha's pain pattern does seem multifactorial, with a mixture of extra-articular and intra-articular pathology.  Her x-rays have been discussed with her in detail and we do see evidence of the medial compartment degenerative changes, as well as mild patella deviation on the medial side.  She has soft tissue pathology consistent with patellar tendinosis as well.    Plan:  We have discussed all of this in detail.  At this point, for the soft tissue pathology and the likely patellar maltracking we have placed the patient in physical therapy.  Would expect somewhere between 6 to 12 weeks for relief on this.  For the intra-articular pathology, the patient has had 2 injections in the knee, with the last 1 being more painful and not beneficial.  Because of this we have assisted her in getting scheduled for an ultrasound-guided corticosteroid injection for the coming weeks.  In the meantime I have prescribed oral diclofenac to be taken up to twice daily as needed for the pain.  All questions have been answered.  Return precautions advised.    It was a pleasure seeing Elisha today.    Options for treatment and/or follow-up care were reviewed with the patient was actively involved in the decision making process. Patient verbalized understanding and was in agreement with the plan.    Joel Blunt DO  , Sports Medicine  Department of Family Medicine and [facility]

## 2025-04-17 ENCOUNTER — PRE VISIT (OUTPATIENT)
Dept: ORTHOPEDICS | Facility: CLINIC | Age: 70
End: 2025-04-17

## 2025-04-17 ENCOUNTER — OFFICE VISIT (OUTPATIENT)
Dept: ORTHOPEDICS | Facility: CLINIC | Age: 70
End: 2025-04-17
Payer: MEDICARE

## 2025-04-17 ENCOUNTER — ANCILLARY PROCEDURE (OUTPATIENT)
Dept: GENERAL RADIOLOGY | Facility: CLINIC | Age: 70
End: 2025-04-17
Attending: STUDENT IN AN ORGANIZED HEALTH CARE EDUCATION/TRAINING PROGRAM
Payer: MEDICARE

## 2025-04-17 DIAGNOSIS — M17.11 PRIMARY OSTEOARTHRITIS OF RIGHT KNEE: ICD-10-CM

## 2025-04-17 DIAGNOSIS — M17.12 PRIMARY OSTEOARTHRITIS OF LEFT KNEE: ICD-10-CM

## 2025-04-17 DIAGNOSIS — M76.50 PATELLAR TENDINOSIS: Primary | ICD-10-CM

## 2025-04-17 DIAGNOSIS — M25.561 RIGHT KNEE PAIN, UNSPECIFIED CHRONICITY: ICD-10-CM

## 2025-04-17 NOTE — PATIENT INSTRUCTIONS
[facility] Outpatient Physical Therapy Locations    To schedule an appointment please call our scheduling department at [phone]    To fax a referral to be scheduled fax to [phone]    [place]: [address], [facility]: [address] [facility]: [address], [facility]: [address], [facility]: [address], [facility]: [address] [facility]: [address] [place], [place]  [place]: [address] [facility] , [address], [facility]: [address] [facility] , [address], [facility]: [address] [facility]: [address], [facility]: [address], [facility]: [address], [facility]: [address], [facility]: [address] [facility]: [address] [place] [address] [facility]: [facility]: [address] [facility], [facility]: [address], [facility]: [address], [place], [place]  [place]: [address] [facility]: [address] [place] [address]: [address], [facility]: [address] [facility] [place], [place]  [place]: [address], [facility]: [address], [facility]: [address] [facility] [place], [place]  [facility]: [address] [place], [place]  [facility]: [address] [place] [address]  [place]: [address] [facility], [address], [facility] [address] [facility],  [facility]: [address] [facility] [place], [place]

## 2025-04-17 NOTE — LETTER
4/17/2025      RE: Elisha Gao  [address]  [place] [address]     Dear Colleague,    Thank you for referring your patient, Elisha Gao, to the [facility] SPORTS MEDICINE [facility] [place]. Please see a copy of my visit note below.    [facility]  Sports Medicine Clinic  Clinics and Surgery Center           SUBJECTIVE       Elisha Gao is a 69 year old female presenting to clinic today for right knee. She endorse anterior knee pain today for about 1 year.     Background:   Occupation:   Hand Dominance (If pertinent): NA     Injury (Y/N): No   Work Comp (Y/N): No  Date of injury: NA   Mechanism of Injury: No KEL     Duration of symptoms: 1 year    Intensity (1-10): 6/10    Aggravating factors: Horse back riding and walking    Relieving Factors: CSI about a year ago, she also had one 4 months ago without relief.    Prior Evaluation: No    Previous Surgery on the area (Y/N): No    Physical Therapy (Previous/Current/None): No     Physical Activity/Exercise (What, How Often): Horseback riding, and walking, hiking. 4x a week       PMH, Medications and Allergies were reviewed and updated as needed.    ROS:  As noted above otherwise negative.    Patient Active Problem List   Diagnosis     Major depressive disorder in partial remission, unspecified whether recurrent     Invasive ductal carcinoma of breast, female, right (H)     Malignant neoplasm of upper-outer quadrant of right breast in female, estrogen receptor positive (H)     Osteopenia of multiple sites     Right wrist pain       Current Outpatient Medications   Medication Sig Dispense Refill     anastrozole (ARIMIDEX) 1 MG tablet Take 1 tablet (1 mg) by mouth daily 90 tablet 3     buPROPion (WELLBUTRIN XL) 150 MG 24 hr tablet Take 1 tablet (150 mg) by mouth every morning. 90 tablet 3     citalopram (CELEXA) 40 MG tablet Take 1 tablet (40 mg) by mouth daily. 90 tablet 3     magnesium 250 MG tablet Take 1 tablet by mouth every  morning        ondansetron (ZOFRAN ODT) 4 MG ODT tab Take 1 tablet (4 mg) by mouth every 8 hours as needed for nausea 18 tablet 0     simvastatin (ZOCOR) 20 MG tablet Take 1 tablet (20 mg) by mouth at bedtime. 90 tablet 3     Vitamin D, Cholecalciferol, 25 MCG (1000 UT) TABS Take 3 tablets by mouth every morning        diclofenac (VOLTAREN) 50 MG EC tablet Take 1 tablet (50 mg) by mouth 2 times daily as needed for moderate pain. 60 tablet 0            OBJECTIVE:       Vitals: There were no vitals filed for this visit.  BMI: There is no height or weight on file to calculate BMI.    Gen:  Well nourished and in no acute distress  HEENT: Extraocular movement intact  Neck: Supple  Pulm:  Breathing Comfortably. No increased respiratory effort.  Psych: Euthymic. Appropriately answers questions    MSK: Right knee without evidence of an effusion.  Full range of motion.  Joint line tenderness of the medial aspect, as well as patellar tendon pain in the mid substance.  Full range of motion with crepitus.  Strength is appropriate.  Negative anterior drawer and Lachman.  Negative posterior drawer.  Negative varus and valgus stress testing.  Negative Apley grind.  Positive Thessaly to the anterior knee.      XRAY : I personally reviewed the x-rays of the bilateral knees the patient does have bilateral medial compartment degenerative changes.  What appears to be a calcification within the tibial collateral ligament is apparent as well.          ASSESSMENT and PLAN:     Elisha was seen today for pain.    Diagnoses and all orders for this visit:    Patellar tendinosis  -     Physical Therapy  Referral; Future  -     diclofenac (VOLTAREN) 50 MG EC tablet; Take 1 tablet (50 mg) by mouth 2 times daily as needed for moderate pain.    Primary osteoarthritis of right knee  -     Physical Therapy  Referral; Future  -     diclofenac (VOLTAREN) 50 MG EC tablet; Take 1 tablet (50 mg) by mouth 2 times daily as needed for  moderate pain.    Primary osteoarthritis of left knee  -     Physical Therapy  Referral; Future  -     diclofenac (VOLTAREN) 50 MG EC tablet; Take 1 tablet (50 mg) by mouth 2 times daily as needed for moderate pain.      Elisha is a 69-year-old female presenting to clinic at the recommendation of her primary care physician today for right knee pain.  Elisha's pain pattern does seem multifactorial, with a mixture of extra-articular and intra-articular pathology.  Her x-rays have been discussed with her in detail and we do see evidence of the medial compartment degenerative changes, as well as mild patella deviation on the medial side.  She has soft tissue pathology consistent with patellar tendinosis as well.    Plan:  We have discussed all of this in detail.  At this point, for the soft tissue pathology and the likely patellar maltracking we have placed the patient in physical therapy.  Would expect somewhere between 6 to 12 weeks for relief on this.  For the intra-articular pathology, the patient has had 2 injections in the knee, with the last 1 being more painful and not beneficial.  Because of this we have assisted her in getting scheduled for an ultrasound-guided corticosteroid injection for the coming weeks.  In the meantime I have prescribed oral diclofenac to be taken up to twice daily as needed for the pain.  All questions have been answered.  Return precautions advised.    It was a pleasure seeing Elisha today.    Options for treatment and/or follow-up care were reviewed with the patient was actively involved in the decision making process. Patient verbalized understanding and was in agreement with the plan.    Joel Blunt DO  , Sports Medicine  Department of [facility] and [facility]      Again, thank you for allowing me to participate in the care of your patient.      Sincerely,    Joel Blunt DO

## 2025-04-19 ASSESSMENT — ACTIVITIES OF DAILY LIVING (ADL)
WALK: ACTIVITY IS NOT DIFFICULT
WEAKNESS: I DO NOT HAVE THE SYMPTOM
STIFFNESS: THE SYMPTOM AFFECTS MY ACTIVITY SLIGHTLY
WALK: ACTIVITY IS NOT DIFFICULT
GIVING WAY, BUCKLING OR SHIFTING OF KNEE: I DO NOT HAVE THE SYMPTOM
WEAKNESS: I DO NOT HAVE THE SYMPTOM
GO DOWN STAIRS: ACTIVITY IS MINIMALLY DIFFICULT
HOW_WOULD_YOU_RATE_THE_CURRENT_FUNCTION_OF_YOUR_KNEE_DURING_YOUR_USUAL_DAILY_ACTIVITIES_ON_A_SCALE_FROM_0_TO_100_WITH_100_BEING_YOUR_LEVEL_OF_KNEE_FUNCTION_PRIOR_TO_YOUR_INJURY_AND_0_BEING_THE_INABILITY_TO_PERFORM_ANY_OF_YOUR_USUAL_DAILY_ACTIVITIES?: 75
KNEE_ACTIVITY_OF_DAILY_LIVING_SUM: 56
AS_A_RESULT_OF_YOUR_KNEE_INJURY,_HOW_WOULD_YOU_RATE_YOUR_CURRENT_LEVEL_OF_DAILY_ACTIVITY?: NEARLY NORMAL
LIMPING: I HAVE THE SYMPTOM BUT IT DOES NOT AFFECT MY ACTIVITY
HOW_WOULD_YOU_RATE_THE_OVERALL_FUNCTION_OF_YOUR_KNEE_DURING_YOUR_USUAL_DAILY_ACTIVITIES?: NEARLY NORMAL
KNEEL ON THE FRONT OF YOUR KNEE: ACTIVITY IS SOMEWHAT DIFFICULT
STIFFNESS: THE SYMPTOM AFFECTS MY ACTIVITY SLIGHTLY
KNEE_ACTIVITY_OF_DAILY_LIVING_SCORE: 80
KNEEL ON THE FRONT OF YOUR KNEE: ACTIVITY IS SOMEWHAT DIFFICULT
PAIN: THE SYMPTOM AFFECTS MY ACTIVITY SLIGHTLY
PAIN: THE SYMPTOM AFFECTS MY ACTIVITY SLIGHTLY
GIVING WAY, BUCKLING OR SHIFTING OF KNEE: I DO NOT HAVE THE SYMPTOM
SQUAT: ACTIVITY IS SOMEWHAT DIFFICULT
SIT WITH YOUR KNEE BENT: ACTIVITY IS MINIMALLY DIFFICULT
HOW_WOULD_YOU_RATE_THE_CURRENT_FUNCTION_OF_YOUR_KNEE_DURING_YOUR_USUAL_DAILY_ACTIVITIES_ON_A_SCALE_FROM_0_TO_100_WITH_100_BEING_YOUR_LEVEL_OF_KNEE_FUNCTION_PRIOR_TO_YOUR_INJURY_AND_0_BEING_THE_INABILITY_TO_PERFORM_ANY_OF_YOUR_USUAL_DAILY_ACTIVITIES?: 75
HOW_WOULD_YOU_RATE_THE_OVERALL_FUNCTION_OF_YOUR_KNEE_DURING_YOUR_USUAL_DAILY_ACTIVITIES?: NEARLY NORMAL
GO UP STAIRS: ACTIVITY IS MINIMALLY DIFFICULT
AS_A_RESULT_OF_YOUR_KNEE_INJURY,_HOW_WOULD_YOU_RATE_YOUR_CURRENT_LEVEL_OF_DAILY_ACTIVITY?: NEARLY NORMAL
PLEASE_INDICATE_YOR_PRIMARY_REASON_FOR_REFERRAL_TO_THERAPY:: KNEE
SWELLING: I DO NOT HAVE THE SYMPTOM
SIT WITH YOUR KNEE BENT: ACTIVITY IS MINIMALLY DIFFICULT
GO DOWN STAIRS: ACTIVITY IS MINIMALLY DIFFICULT
GO UP STAIRS: ACTIVITY IS MINIMALLY DIFFICULT
RISE FROM A CHAIR: ACTIVITY IS MINIMALLY DIFFICULT
STAND: ACTIVITY IS MINIMALLY DIFFICULT
RISE FROM A CHAIR: ACTIVITY IS MINIMALLY DIFFICULT
LIMPING: I HAVE THE SYMPTOM BUT IT DOES NOT AFFECT MY ACTIVITY
SQUAT: ACTIVITY IS SOMEWHAT DIFFICULT
STAND: ACTIVITY IS MINIMALLY DIFFICULT
SWELLING: I DO NOT HAVE THE SYMPTOM
RAW_SCORE: 56

## 2025-04-24 ENCOUNTER — THERAPY VISIT (OUTPATIENT)
Age: 70
End: 2025-04-24
Attending: STUDENT IN AN ORGANIZED HEALTH CARE EDUCATION/TRAINING PROGRAM
Payer: MEDICARE

## 2025-04-24 DIAGNOSIS — M17.12 PRIMARY OSTEOARTHRITIS OF LEFT KNEE: ICD-10-CM

## 2025-04-24 DIAGNOSIS — M17.11 PRIMARY OSTEOARTHRITIS OF RIGHT KNEE: ICD-10-CM

## 2025-04-24 DIAGNOSIS — M76.50 PATELLAR TENDINOSIS: ICD-10-CM

## 2025-04-24 NOTE — PROGRESS NOTES
PHYSICAL THERAPY EVALUATION  Type of Visit: Evaluation        Fall Risk Screen:  Have you fallen 2 or more times in the past year?: Yes  Have you fallen and had an injury in the past year?: No      Subjective     Patient reports infrapatellar knee pain on the right side. She notes the pain has been bothering her for years but has gotten significantly worse over the last four months or so. The pain is always a little worse after horseback riding, sometimes worse after golfing, and usually worse with walking. She also notes knee pain both ascending and descending stairs. Planning for US-guided corticosteroid injection soon.        Presenting condition or subjective complaint: ppain in right knee  Date of onset: 12/24/24    Relevant medical history: Cancer; Radiation treatment   Dates & types of surgery: Hysterectomy 1987    Prior diagnostic imaging/testing results: X-ray     Prior therapy history for the same diagnosis, illness or injury: No      Prior Level of Function  Transfers: Independent  Ambulation: Independent  ADL: Independent    Living Environment  Social support: With a significant other or spouse   Type of home: House; Basement   Stairs to enter the home: Yes 6 Is there a railing: Yes     Ramp: No   Stairs inside the home: Yes 16 Is there a railing: Yes     Help at home: None  Equipment owned:       Employment: No    Hobbies/Interests: golfing, hiking, horse back riding    Patient goals for therapy: I would like to do the things I used to do without pain       Objective   KNEE EVALUATION  PAIN: Pain Level at Rest: 0/10  Pain Level with Use: 8/10  Pain Location: knee  Pain Quality: Aching and Sharp  Pain Frequency: intermittent  Pain is Worst: no difference between day and night  Pain is Exacerbated By: walking, horseback riding, stairs  Pain is Relieved By: nothing has helped except for oral Voltaren  Pain Progression: Worsened    GAIT:  Weightbearing Status: WBAT  Assistive Device(s): None  Gait  Deviations: WNL    BALANCE/PROPRIOCEPTION:  able to stand on one leg 2-8 seconds per side with eyes open  ROM:  R: -1-138; L: -2-135  STRENGTH:   Pain: - none + mild ++ moderate +++ severe  Strength Scale: 0-5/5 Left Right   Knee Flexion 5 5   Knee Extension 5 5   Hip Abduction: R: 3+/5; L: 4+/5    FLEXIBILITY: WNL  SPECIAL TESTS:    Left Right   Ligamentous Stability     Anterior Drawer (ACL) Negative Negative   Posterior Drawer (PCL) Negative Negative   Valgus Stress Testing at 0 Deg and 30 Deg Positive Negative   Varus Stress Testing at 0 Deg and 30 Deg  Negative Negative     FUNCTIONAL TESTS: Double Leg Squat: Anterior knee translation, Hip internal rotation, and Improper use of glutes/hips  PALPATION:  TTP infrapatellar region and in the areas immediately surrounding the patella, especially laterally  JOINT MOBILITY: WNL    Assessment & Plan   CLINICAL IMPRESSIONS  Medical Diagnosis: Patellar tendinosis; Primary osteoarthritis of right knee; Primary osteoarthritis of left knee    Treatment Diagnosis: Knee pain, right   Impression/Assessment: Patient is a 70 year old female with right knee pain complaints. The following significant findings have been identified: Pain, Impaired balance, and Decreased activity tolerance. These impairments interfere with their ability to perform recreational activities and community mobility as compared to previous level of function.     Clinical Decision Making (Complexity):  Clinical Presentation: Stable/Uncomplicated  Clinical Presentation Rationale: based on medical and personal factors listed in PT evaluation  Clinical Decision Making (Complexity): Low complexity    PLAN OF CARE  Treatment Interventions:  Modalities: Cryotherapy, Hot Pack  Interventions: Gait Training, Manual Therapy, Neuromuscular Re-education, Therapeutic Activity, Therapeutic Exercise, Self-Care/Home Management    Long Term Goals     PT Goal 1  Goal Identifier: Ambulation  Goal Description: Patient will  report ability to ambulate one mile with right knee pain <3/10.  Rationale: to maximize safety and independence with performance of ADLs and functional tasks;to maximize safety and independence within the community  Target Date: 06/19/25  PT Goal 2  Goal Identifier: Stairs  Goal Description: Patient will report ability to ascend and descend one flight of stairs with right knee pain 1/10 or less.  Target Date: 06/19/25  PT Goal 3  Goal Identifier: Activity tolerance  Goal Description: Patient will report ability to golf nine holes with knee pain <2/10.  Target Date: 07/03/25      Frequency of Treatment: 1x/week  Duration of Treatment: 10 weeks    Education Assessment:   Learner/Method: Patient;Listening;Reading;Demonstration;No Barriers to Learning    Risks and benefits of evaluation/treatment have been explained.   Patient/Family/caregiver agrees with Plan of Care.     Evaluation Time:     PT Eval, Low Complexity Minutes (96714): 20     Signing Clinician: Brody JEREZ [facility]                                                                                   OUTPATIENT PHYSICAL THERAPY      PLAN OF TREATMENT FOR OUTPATIENT REHABILITATION   Patient's Last Name, First Name, Elisha Crowley YOB: 1955   Provider's Name   [facility]   Medical Record No.  [id number]     Onset Date: 12/24/24  Start of Care Date: 04/24/25     Medical Diagnosis:  Patellar tendinosis; Primary osteoarthritis of right knee; Primary osteoarthritis of left knee      PT Treatment Diagnosis:  Knee pain, right Plan of Treatment  Frequency/Duration: 1x/week/ 10 weeks    Certification date from 04/24/25 to 07/03/25         See note for plan of treatment details and functional goals     Brody Hernandez                         I CERTIFY THE NEED FOR THESE SERVICES FURNISHED UNDER        THIS PLAN OF TREATMENT AND WHILE UNDER MY CARE     (Physician  attestation of this document indicates review and certification of the therapy plan).              Referring Provider:  Joel Blunt    Initial Assessment  See Epic Evaluation- Start of Care Date: 04/24/25

## 2025-04-30 NOTE — PROGRESS NOTES
PROCEDURE ENCOUNTER    [facility]  Josuéeric WILLIS Blunt, DO  2025     Name: Elisha Gao  MRN: [id number]  Age: 70 year old  : 1955    Referring provider:   Diagnosis:  Primary osteoarthritis of right knee  -     Physical Therapy  Referral; Future  -     diclofenac (VOLTAREN) 50 MG EC tablet; Take 1 tablet (50 mg) by mouth 2 times daily as needed for moderate pain.          Elisha is a 69-year-old female presenting to clinic at the recommendation of her primary care physician today for right knee pain.  Elisha's pain pattern does seem multifactorial, with a mixture of extra-articular and intra-articular pathology.  Her x-rays have been discussed with her in detail and we do see evidence of the medial compartment degenerative changes, as well as mild patella deviation on the medial side.  She has soft tissue pathology consistent with patellar tendinosis as well.     Plan:  We have discussed all of this in detail.  At this point, for the soft tissue pathology and the likely patellar maltracking we have placed the patient in physical therapy.  Would expect somewhere between 6 to 12 weeks for relief on this.  For the intra-articular pathology, the patient has had 2 injections in the knee, with the last 1 being more painful and not beneficial.  Because of this we have assisted her in getting scheduled for an ultrasound-guided corticosteroid injection for the coming weeks.  In the meantime I have prescribed oral diclofenac to be taken up to twice daily as needed for the pain.  All questions have been answered.  Return precautions advised.    Procedure: Intraarticular Knee Injection - Ultrasound Guided  The patient was informed of the risks and the benefits of the procedure and a written consent was signed.  The patient s right knee was prepped with chlorhexidine in sterile fashion.   40 mg of triamcinolone suspension was drawn up into a 5 mL syringe with 4 mL of 1% lidocaine.  Injection was  performed using sterile technique.  Under ultrasound guidance a 1.5-inch 21-gauge needle was used to enter the superolateral aspect of the right knee.  Needle placement was visualized and documented with ultrasound.  Ultrasound visualization was necessary due to decreased joint space in the setting of osteoarthritis.  Images were permanently stored for the patient's record.  There were no complications. The patient tolerated the procedure well. There was negligible bleeding.   The patient was instructed to ice the knee upon leaving clinic and refrain from overuse over the next 3 days.   The patient was instructed to call or go to the emergency room with any unusual pain, swelling, redness, or if otherwise concerned.    Large Joint Injection/Arthocentesis: R knee joint    Date/Time: 5/1/2025 9:22 AM    Performed by: Joel Blunt DO  Authorized by: Joel Blunt DO    Indications:  Pain and osteoarthritis  Needle Size:  22 G  Guidance: landmark guided    Approach:  Anterolateral  Location:  Knee      Medications:  40 mg triamcinolone 40 MG/ML; 4 mL lidocaine (PF) 1 %  Outcome:  Tolerated well, no immediate complications  Procedure discussed: discussed risks, benefits, and alternatives    Consent Given by:  Patient  Timeout: timeout called immediately prior to procedure    Prep: patient was prepped and draped in usual sterile fashion

## 2025-05-01 ENCOUNTER — OFFICE VISIT (OUTPATIENT)
Dept: ORTHOPEDICS | Facility: CLINIC | Age: 70
End: 2025-05-01
Payer: MEDICARE

## 2025-05-01 DIAGNOSIS — M17.11 PRIMARY OSTEOARTHRITIS OF RIGHT KNEE: Primary | ICD-10-CM

## 2025-05-01 DIAGNOSIS — M17.12 PRIMARY OSTEOARTHRITIS OF LEFT KNEE: ICD-10-CM

## 2025-05-01 RX ORDER — LIDOCAINE HYDROCHLORIDE 10 MG/ML
4 INJECTION, SOLUTION EPIDURAL; INFILTRATION; INTRACAUDAL; PERINEURAL
Status: COMPLETED | OUTPATIENT
Start: 2025-05-01 | End: 2025-05-01

## 2025-05-01 RX ORDER — TRIAMCINOLONE ACETONIDE 40 MG/ML
40 INJECTION, SUSPENSION INTRA-ARTICULAR; INTRAMUSCULAR
Status: COMPLETED | OUTPATIENT
Start: 2025-05-01 | End: 2025-05-01

## 2025-05-01 RX ADMIN — LIDOCAINE HYDROCHLORIDE 4 ML: 10 INJECTION, SOLUTION EPIDURAL; INFILTRATION; INTRACAUDAL; PERINEURAL at 09:22

## 2025-05-01 RX ADMIN — TRIAMCINOLONE ACETONIDE 40 MG: 40 INJECTION, SUSPENSION INTRA-ARTICULAR; INTRAMUSCULAR at 09:22

## 2025-05-01 NOTE — LETTER
2025      RE: Elisha Gao  [address]  [facility] [address]     Dear Colleague,    Thank you for referring your patient, Elisha Gao, to the [facility] [place]. Please see a copy of my visit note below.    PROCEDURE ENCOUNTER    [facility]  Josuéeric WILLIS Blunt, DO  2025     Name: Elisha Gao  MRN: [id number]  Age: 70 year old  : 1955    Referring provider:   Diagnosis:  Primary osteoarthritis of right knee  -     Physical Therapy  Referral; Future  -     diclofenac (VOLTAREN) 50 MG EC tablet; Take 1 tablet (50 mg) by mouth 2 times daily as needed for moderate pain.          Elisha is a 69-year-old female presenting to clinic at the recommendation of her primary care physician today for right knee pain.  Elisha's pain pattern does seem multifactorial, with a mixture of extra-articular and intra-articular pathology.  Her x-rays have been discussed with her in detail and we do see evidence of the medial compartment degenerative changes, as well as mild patella deviation on the medial side.  She has soft tissue pathology consistent with patellar tendinosis as well.     Plan:  We have discussed all of this in detail.  At this point, for the soft tissue pathology and the likely patellar maltracking we have placed the patient in physical therapy.  Would expect somewhere between 6 to 12 weeks for relief on this.  For the intra-articular pathology, the patient has had 2 injections in the knee, with the last 1 being more painful and not beneficial.  Because of this we have assisted her in getting scheduled for an ultrasound-guided corticosteroid injection for the coming weeks.  In the meantime I have prescribed oral diclofenac to be taken up to twice daily as needed for the pain.  All questions have been answered.  Return precautions advised.    Procedure: Intraarticular Knee Injection - Ultrasound Guided  The patient was informed of the risks  and the benefits of the procedure and a written consent was signed.  The patient s right knee was prepped with chlorhexidine in sterile fashion.   40 mg of triamcinolone suspension was drawn up into a 5 mL syringe with 4 mL of 1% lidocaine.  Injection was performed using sterile technique.  Under ultrasound guidance a 1.5-inch 21-gauge needle was used to enter the superolateral aspect of the right knee.  Needle placement was visualized and documented with ultrasound.  Ultrasound visualization was necessary due to decreased joint space in the setting of osteoarthritis.  Images were permanently stored for the patient's record.  There were no complications. The patient tolerated the procedure well. There was negligible bleeding.   The patient was instructed to ice the knee upon leaving clinic and refrain from overuse over the next 3 days.   The patient was instructed to call or go to the emergency room with any unusual pain, swelling, redness, or if otherwise concerned.    Large Joint Injection/Arthocentesis: R knee joint    Date/Time: 5/1/2025 9:22 AM    Performed by: Joel Blunt DO  Authorized by: Joel Blunt DO    Indications:  Pain and osteoarthritis  Needle Size:  22 G  Guidance: landmark guided    Approach:  Anterolateral  Location:  Knee      Medications:  40 mg triamcinolone 40 MG/ML; 4 mL lidocaine (PF) 1 %  Outcome:  Tolerated well, no immediate complications  Procedure discussed: discussed risks, benefits, and alternatives    Consent Given by:  Patient  Timeout: timeout called immediately prior to procedure    Prep: patient was prepped and draped in usual sterile fashion            Again, thank you for allowing me to participate in the care of your patient.      Sincerely,    Joel Blunt DO

## 2025-05-01 NOTE — NURSING NOTE
[address] [address]  Dept: [phone]  ______________________________________________________________________________    Patient: Elisha Gao   : 1955   MRN: [id number]   May 1, 2025    INVASIVE PROCEDURE SAFETY CHECKLIST    Date: May 1, 2025   Procedure: Right knee CSI US guided  Patient Name: Elisha Gao  MRN: [id number]  YOB: 1955    Action: Complete sections as appropriate. Any discrepancy results in a HARD COPY until resolved.     PRE PROCEDURE:  Patient ID verified with 2 identifiers (name and  or MRN): Yes  Procedure and site verified with patient/designee (when able): Yes  Accurate consent documentation in medical record: Yes  H&P (or appropriate assessment) documented in medical record: Yes  H&P must be up to 20 days prior to procedure and updates within 24 hours of procedure as applicable: NA  Relevant diagnostic and radiology test results appropriately labeled and displayed as applicable: Yes  Procedure site(s) marked with provider initials: NA    TIMEOUT:  Time-Out performed immediately prior to starting procedure, including verbal and active participation of all team members addressing the following:Yes  * Correct patient identify  * Confirmed that the correct side and site are marked  * An accurate procedure consent form  * Agreement on the procedure to be done  * Correct patient position  * Relevant images and results are properly labeled and appropriately displayed  * The need to administer antibiotics or fluids for irrigation purposes during the procedure as applicable   * Safety precautions based on patient history or medication use    DURING PROCEDURE: Verification of correct person, site, and procedures any time the responsibility for care of the patient is transferred to another member of the care team.       Prior to injection, verified patient identity using patient's name and date of birth.  Due  to injection administration, patient instructed to remain in clinic for 15 minutes  afterwards, and to report any adverse reaction to me immediately.    Joint injection was performed.      Drug Amount Wasted:  Yes: 1 mg/ml   Vial/Syringe: Single dose vial  Expiration Date:  07/28       Sofy Kyle, [facility]  May 1, 2025

## 2025-05-15 ENCOUNTER — THERAPY VISIT (OUTPATIENT)
Age: 70
End: 2025-05-15
Payer: MEDICARE

## 2025-05-15 DIAGNOSIS — M17.12 PRIMARY OSTEOARTHRITIS OF LEFT KNEE: ICD-10-CM

## 2025-05-15 DIAGNOSIS — M76.50 PATELLAR TENDINOSIS: Primary | ICD-10-CM

## 2025-05-15 DIAGNOSIS — M17.11 PRIMARY OSTEOARTHRITIS OF RIGHT KNEE: ICD-10-CM

## 2025-06-03 ENCOUNTER — THERAPY VISIT (OUTPATIENT)
Age: 70
End: 2025-06-03
Payer: MEDICARE

## 2025-06-03 DIAGNOSIS — M17.12 PRIMARY OSTEOARTHRITIS OF LEFT KNEE: ICD-10-CM

## 2025-06-03 DIAGNOSIS — M17.11 PRIMARY OSTEOARTHRITIS OF RIGHT KNEE: ICD-10-CM

## 2025-06-03 DIAGNOSIS — M76.50 PATELLAR TENDINOSIS: Primary | ICD-10-CM

## 2025-06-03 PROCEDURE — 97530 THERAPEUTIC ACTIVITIES: CPT | Mod: GP

## 2025-06-03 PROCEDURE — 97110 THERAPEUTIC EXERCISES: CPT | Mod: GP

## 2025-06-03 NOTE — PROGRESS NOTES
[facility] Dermatology Note  Encounter Date: Jun 6, 2025  Date of Last Dermatology Office Visit: 9/29/2023     Dermatology Problem List:  LAST FBSE:  06/06/2025     # Compound dysplastic nevus with moderate to severe junctional atypia, right back, s/p shave bx 9/29/23, s/p excision 12/19/23   ____________________________________________    Assessment & Plan:     # LTM  - R mid upper arm, 6 mm light/ medium brown macule. Reassuringly present since youth.   - Photodocumentation taken on 06/06/2025    # Benign lesions - SKs, cherry angiomas, lentigenes.  - No treatment required    # Multiple benign nevi   - Hx of DN  - Monitor for ABCDEs of melanoma   - Continue sun protection - recommend SPF 30 or higher with frequent application   - Return sooner if noticing changing or symptomatic lesions     Procedures Performed:   N/A    Follow-up: 1 year(s) in-person, or earlier for new or changing lesions    Staff and Scribe:     Scribe Disclosure:   I, Richelle aMrtino, am serving as a scribe; to document services personally performed by Lissy Broussard MD -based on data collection and the provider's statements to me.     Provider Disclosure:   The documentation recorded by the scribe accurately reflects the services I personally performed and the decisions made by me.    Lissy Broussard MD    Department of Dermatology  [facility]: Phone: [phone], Fax: [phone]  6/12/2025        ____________________________________________    CC: Skin Check (Here today for a skin check. No concerns. )      HPI:  Ms. Elisha Gao is a(n) 70 year old female who presents today as a return patient for [facility].     No specific skin concerns.  Traveling to [place] soon.   Excision on her R back went well.     Patient is otherwise feeling well, without additional skin concerns.    Labs Reviewed:  Derm path 09/29/2023  Final  Diagnosis   A. RIGHT BACK:  - Compound dysplastic nevus with moderate to severe jucntional atypia - (see comment)      Physical exam:  Vitals: There were no vitals taken for this visit.  GEN: This is a well developed, well-nourished female in no acute distress, in a pleasant mood.    SKIN: Total skin excluding the undergarment areas was performed. The exam included the head/face, neck, both arms, chest, back, abdomen, both legs, digits and/or nails.   - R mid upper arm, 6 mm light/ medium brown macule.   - Multiple regular brown pigmented macules and papules are identified on the trunk and extremities.   - Scattered brown macules on sun exposed areas.  - There are dome shaped bright red papules on the trunk and extremities.   - Waxy stuck on papules and plaques on trunk and extremities.   - No other lesions of concern on areas examined.     Medications:  Current Outpatient Medications   Medication Sig Dispense Refill    anastrozole (ARIMIDEX) 1 MG tablet Take 1 tablet (1 mg) by mouth daily 90 tablet 3    buPROPion (WELLBUTRIN XL) 150 MG 24 hr tablet Take 1 tablet (150 mg) by mouth every morning. 90 tablet 3    citalopram (CELEXA) 40 MG tablet Take 1 tablet (40 mg) by mouth daily. 90 tablet 3    diclofenac (VOLTAREN) 50 MG EC tablet Take 1 tablet (50 mg) by mouth 2 times daily as needed for moderate pain. 60 tablet 0    magnesium 250 MG tablet Take 1 tablet by mouth every morning       ondansetron (ZOFRAN ODT) 4 MG ODT tab Take 1 tablet (4 mg) by mouth every 8 hours as needed for nausea 18 tablet 0    simvastatin (ZOCOR) 20 MG tablet Take 1 tablet (20 mg) by mouth at bedtime. 90 tablet 3    Vitamin D, Cholecalciferol, 25 MCG (1000 UT) TABS Take 3 tablets by mouth every morning        No current facility-administered medications for this visit.        Past Medical History:   Patient Active Problem List   Diagnosis    Major depressive disorder in partial remission, unspecified whether recurrent    Invasive ductal  carcinoma of breast, female, right (H)    Malignant neoplasm of upper-outer quadrant of right breast in female, estrogen receptor positive (H)    Osteopenia of multiple sites    Right wrist pain    Patellar tendinosis    Primary osteoarthritis of right knee    Primary osteoarthritis of left knee     Past Medical History:   Diagnosis Date    Breast cancer (H) 03/2022    Chronic low back pain     for a couple years, worse at night, not evaluated    Depression     Essential tremor     Invasive ductal carcinoma of breast, female, right (H)     osteopenia     FRAX  11/2%   2022       CC Referred Self, MD  No address on file on close of this encounter.

## 2025-06-06 ENCOUNTER — OFFICE VISIT (OUTPATIENT)
Dept: DERMATOLOGY | Facility: CLINIC | Age: 70
End: 2025-06-06
Payer: MEDICARE

## 2025-06-06 DIAGNOSIS — L82.1 SEBORRHEIC KERATOSES: ICD-10-CM

## 2025-06-06 DIAGNOSIS — Z12.83 SKIN CANCER SCREENING: Primary | ICD-10-CM

## 2025-06-06 DIAGNOSIS — Z86.018 HISTORY OF DYSPLASTIC NEVUS: ICD-10-CM

## 2025-06-06 DIAGNOSIS — D18.01 CHERRY ANGIOMA: ICD-10-CM

## 2025-06-06 DIAGNOSIS — D22.9 MULTIPLE BENIGN NEVI: ICD-10-CM

## 2025-06-06 DIAGNOSIS — L81.4 SOLAR LENTIGO: ICD-10-CM

## 2025-06-06 ASSESSMENT — PAIN SCALES - GENERAL: PAINLEVEL_OUTOF10: NO PAIN (0)

## 2025-06-06 NOTE — PATIENT INSTRUCTIONS

## 2025-06-06 NOTE — LETTER
6/6/2025       RE: Elisha Gao  [address] [facility] [address]     Dear Colleague,    Thank you for referring your patient, Elisha Gao, to the [facility] [place] [facility]. Please see a copy of my visit note below.    [facility] Dermatology Note  Encounter Date: Jun 6, 2025  Date of Last Dermatology Office Visit: 9/29/2023     Dermatology Problem List:  LAST FBSE:  06/06/2025     # Compound dysplastic nevus with moderate to severe junctional atypia, right back, s/p shave bx 9/29/23, s/p excision 12/19/23   ____________________________________________    Assessment & Plan:     # LTM  - R mid upper arm, 6 mm light/ medium brown macule. Reassuringly present since youth.   - Photodocumentation taken on 06/06/2025    # Benign lesions - SKs, cherry angiomas, lentigenes.  - No treatment required    # Multiple benign nevi   - Hx of DN  - Monitor for ABCDEs of melanoma   - Continue sun protection - recommend SPF 30 or higher with frequent application   - Return sooner if noticing changing or symptomatic lesions     Procedures Performed:   N/A    Follow-up: 1 year(s) in-person, or earlier for new or changing lesions    Staff and Scribe:     Scribe Disclosure:   I, Richelle Martino, am serving as a scribe; to document services personally performed by Lissy Broussard MD -based on data collection and the provider's statements to me.     Provider Disclosure:   The documentation recorded by the scribe accurately reflects the services I personally performed and the decisions made by me.    Lissy Broussard MD    Department of Dermatology  [facility]: Phone: [phone], Fax: [phone]  6/12/2025        ____________________________________________    CC: Skin Check (Here today for a skin check. No concerns.  )      HPI:  Ms. Elisha Gao is a(n) 70 year old female who presents today as a return patient for [facility].     No specific skin concerns.  Traveling to [place] soon.   Excision on her R back went well.     Patient is otherwise feeling well, without additional skin concerns.    Labs Reviewed:  Derm path 09/29/2023  Final Diagnosis   A. RIGHT BACK:  - Compound dysplastic nevus with moderate to severe jucntional atypia - (see comment)      Physical exam:  Vitals: There were no vitals taken for this visit.  GEN: This is a well developed, well-nourished female in no acute distress, in a pleasant mood.    SKIN: Total skin excluding the undergarment areas was performed. The exam included the head/face, neck, both arms, chest, back, abdomen, both legs, digits and/or nails.   - R mid upper arm, 6 mm light/ medium brown macule.   - Multiple regular brown pigmented macules and papules are identified on the trunk and extremities.   - Scattered brown macules on sun exposed areas.  - There are dome shaped bright red papules on the trunk and extremities.   - Waxy stuck on papules and plaques on trunk and extremities.   - No other lesions of concern on areas examined.     Medications:  Current Outpatient Medications   Medication Sig Dispense Refill     anastrozole (ARIMIDEX) 1 MG tablet Take 1 tablet (1 mg) by mouth daily 90 tablet 3     buPROPion (WELLBUTRIN XL) 150 MG 24 hr tablet Take 1 tablet (150 mg) by mouth every morning. 90 tablet 3     citalopram (CELEXA) 40 MG tablet Take 1 tablet (40 mg) by mouth daily. 90 tablet 3     diclofenac (VOLTAREN) 50 MG EC tablet Take 1 tablet (50 mg) by mouth 2 times daily as needed for moderate pain. 60 tablet 0     magnesium 250 MG tablet Take 1 tablet by mouth every morning        ondansetron (ZOFRAN ODT) 4 MG ODT tab Take 1 tablet (4 mg) by mouth every 8 hours as needed for nausea 18 tablet 0     simvastatin (ZOCOR) 20 MG tablet Take 1 tablet (20 mg) by mouth at bedtime. 90 tablet 3      Vitamin D, Cholecalciferol, 25 MCG (1000 UT) TABS Take 3 tablets by mouth every morning        No current facility-administered medications for this visit.        Past Medical History:   Patient Active Problem List   Diagnosis     Major depressive disorder in partial remission, unspecified whether recurrent     Invasive ductal carcinoma of breast, female, right (H)     Malignant neoplasm of upper-outer quadrant of right breast in female, estrogen receptor positive (H)     Osteopenia of multiple sites     Right wrist pain     Patellar tendinosis     Primary osteoarthritis of right knee     Primary osteoarthritis of left knee     Past Medical History:   Diagnosis Date     Breast cancer (H) 03/2022     Chronic low back pain     for a couple years, worse at night, not evaluated     Depression      Essential tremor      Invasive ductal carcinoma of breast, female, right (H)      osteopenia     FRAX  11/2%   2022       CC Referred Self, MD  No address on file on close of this encounter.    Again, thank you for allowing me to participate in the care of your patient.      Sincerely,    Lissy Broussard MD

## 2025-06-06 NOTE — NURSING NOTE
Dermatology Rooming Note    Elisha Gao's goals for this visit include:   Chief Complaint   Patient presents with    Skin Check     Here today for a skin check. No concerns.      Nata Shay RN

## 2025-06-30 NOTE — PROGRESS NOTES
Oncology Visit:  Date on this visit: Jul 1, 2025    Diagnosis: right breast cancer.    Primary Physician: Debi Pizarro     History Of Present Illness:  Ms. Gao is a 70 year old female with right breast cancer.  Routine screening mammogram on 3/17/2022 showed an asymmetry in the upper outer right breast.  Diagnostic breast imaging confirmed a 4 mm spiculated mass at 11:00, 4 cm from the nipple of the right breast.  Biopsy was consistent with a grade 2 invasive ductal carcinoma, ER strong in 98%, MO moderate in 40%, and HER2 negative by FISH and atypical ductal hyperplasia.  Patient underwent right breast lumpectomy and right axillary sentinel lymph node biopsy under the care of Dr. Garcia on 4/11/2022.  Pathology showed a grade 2 invasive ductal carcinoma measuring 9 mm with associated low grade DCIS.  Surgical margins were negative.  There was no lymphovascular invasion and two sentinel lymph nodes were benign.  She completed radiation, 2600 cGy in 5 fractions, to the right breast on 5/25/2022.  She has been on anastrozole since 6/15/2022.  She received 3 years of Zometa from 9/14/2022 - 9/2/2025.    Interval History:   Ms. Gao comes into clinic today for a breast cancer on treatment visit.  She is on adjuvant curative intent treatment with anastrozole.  In general, she is tolerating the medication well.  Since her last visit, she has had an increase in bilateral knee pain.  She has chronic osteoarthritis of the knees.  She has previously had multiple injections.  She notes that she is having increasing difficulty with some activities.  For example, getting off of a horse after horseback riding.  She denies hot flashes or mood disorder.  She has no new bone or joint aches or pains.  She has no cough, shortness of breath, or chest pain.  She has no abdominal complaints.  She recently returned from a trip to [place].  Unfortunately, while there she got COVID.  Her symptoms were to the point where she was unsure  whether she was going to be able to fly back on the day originally planned.  Thankfully, she was able to do so and her symptoms have now resolved.    Past Medical/Surgical History:  Past Medical History:   Diagnosis Date    Breast cancer (H) 03/2022    Chronic low back pain     for a couple years, worse at night, not evaluated    Depression     Essential tremor     Invasive ductal carcinoma of breast, female, right (H)     osteopenia     FRAX  11/2%   2022   Colonoscopy in 2012 w/o polyps, next due in 2022.    Past Surgical History:   Procedure Laterality Date    ABDOMEN SURGERY  1987    BREAST SURGERY      COLONOSCOPY  2020    GYN SURGERY  1987    HYSTERECTOMY  1987    endometriosis, ovarian cysts, hormones x 10  years    LASIK Bilateral     LUMPECTOMY, BREAST, LOCALIZED USING RADIOFREQUENCY IDENTIFICATION Right 04/11/2022    Procedure: LUMPECTOMY, BREAST, LOCALIZED USING RADIOFREQUENCY IDENTIFICATION, sentinel lymph node biopsy;  Surgeon: Santy Garcia MD;  Location: [facility] OR     Allergies:  Allergies as of 07/01/2025 - Reviewed 06/06/2025   Allergen Reaction Noted    Alendronate Nausea and Vomiting 05/26/2010    Raloxifene Nausea 05/26/2010    Risedronate Nausea and Vomiting 05/26/2010     Current Medications:  Current Outpatient Medications   Medication Sig Dispense Refill    anastrozole (ARIMIDEX) 1 MG tablet Take 1 tablet (1 mg) by mouth daily 90 tablet 3    buPROPion (WELLBUTRIN XL) 150 MG 24 hr tablet Take 1 tablet (150 mg) by mouth every morning. 90 tablet 3    citalopram (CELEXA) 40 MG tablet Take 1 tablet (40 mg) by mouth daily. 90 tablet 3    diclofenac (VOLTAREN) 50 MG EC tablet Take 1 tablet (50 mg) by mouth 2 times daily as needed for moderate pain. 60 tablet 0    magnesium 250 MG tablet Take 1 tablet by mouth every morning       ondansetron (ZOFRAN ODT) 4 MG ODT tab Take 1 tablet (4 mg) by mouth every 8 hours as needed for nausea 18 tablet 0    simvastatin (ZOCOR) 20 MG tablet Take 1 tablet (20 mg)  by mouth at bedtime. 90 tablet 3    Vitamin D, Cholecalciferol, 25 MCG (1000 UT) TABS Take 3 tablets by mouth every morning         Family and Social History:  See initial consultation dated 4/4/2022 for further details.  Breast Actionable panel on 4/4/2022 was negative for germline genetic mutation.    Physical Exam:  BP (!) 147/88 (BP Location: Right arm, Patient Position: Sitting, Cuff Size: Adult Regular)   Pulse 70   Temp 98.2  F (36.8  C) (Oral)   Resp 18   Wt 59.7 kg (131 lb 11.2 oz)   SpO2 99%   BMI 21.92 kg/m    General:  Well appearing adult female in NAD.  Alert and oriented x 3.  HEENT:  Normocephalic.  Sclera anicteric.    Lymph:  No palpable cervical, supraclavicular, or axillary LAD.  Chest:  CTAB, no wheezes or crackles.  CV:  RRR.  Nl S1 and S2.  No audible m/r/g.  Breast:  Right axillary and right upper outer breast incision are well healed. There is some fibrosis along the upper outer lumpectomy site unchanged from prior. There are no discretely palpable masses in either breast.  Bilateral nipples are everted and without discharge.   Abd:  Soft/ND  Ext:  No edema of the bilateral lower extremities.   Neuro:  Cranial nerves grossly intact.  (+) resting tremor.  Gait stable.  Psych:  Mood and affect appear normal.  Skin:  No visible concerning skin rashes or lesions.    Laboratory/Imaging Studies  I personally reviewed the below images while in clinic today:    1/6/2025 Bilateral screening mammograms:  Findings: There are scattered areas of fibroglandular density.  Right breast conservation therapy changes.  There is no radiographic evidence of malignancy.                                                                    IMPRESSION: ACR BI-RADS Category 2: Benign    ASSESSMENT/PLAN:  71 yo female with stage Ia, [id number], grade 2, ER/AR positive, HER-2 negative invasive ductal carcinoma of the right breast.   She is s/p right breast lumpectomy and right axillary sentinel lymph node  procedure.    1.  Right breast cancer:  Ms. Gao is 3 years, 2.5 months out from excision of a right breast cancer.  She is on adjuvant curative intent treatment with anastrozole. She is tolerating it well. Plan is to treat with a total 5 years of endocrine therapy (through 6/15/2027).    There are no concerning signs or symptoms of recurrence today.   - Bilateral screening mammograms and return clinic visit 1/7/2026 or later.    2.  Osteopenia:  DEXA bone density scan 5/6/2024 with lowest T-score of -2.1 c/w osteopenia.  She is at risk of osteoporosis on anastrozole therapy.  - She is s/p treatment with 3 years of Zometa from 9/14/2022 - 9/2/2025 (received last infusion on 3/4/2025).  Will hold further Zometa.  - Take calcium 1200 mg and vitamin D 1000 IUs daily  - Walk 30 minutes daily.    3.  Follow Up:  - Bilateral screening mammograms and return clinic visit 1/7/2026 or later.    I spent 26 minutes on the date of the encounter doing chart review, review of test results, interpretation of tests, patient visit, and documentation

## 2025-07-01 ENCOUNTER — ONCOLOGY VISIT (OUTPATIENT)
Dept: ONCOLOGY | Facility: CLINIC | Age: 70
End: 2025-07-01
Attending: INTERNAL MEDICINE
Payer: MEDICARE

## 2025-07-01 VITALS
TEMPERATURE: 98.2 F | BODY MASS INDEX: 21.92 KG/M2 | SYSTOLIC BLOOD PRESSURE: 147 MMHG | RESPIRATION RATE: 18 BRPM | DIASTOLIC BLOOD PRESSURE: 88 MMHG | OXYGEN SATURATION: 99 % | HEART RATE: 70 BPM | WEIGHT: 131.7 LBS

## 2025-07-01 DIAGNOSIS — Z17.0 MALIGNANT NEOPLASM OF UPPER-OUTER QUADRANT OF RIGHT BREAST IN FEMALE, ESTROGEN RECEPTOR POSITIVE (H): Primary | ICD-10-CM

## 2025-07-01 DIAGNOSIS — C50.411 MALIGNANT NEOPLASM OF UPPER-OUTER QUADRANT OF RIGHT BREAST IN FEMALE, ESTROGEN RECEPTOR POSITIVE (H): Primary | ICD-10-CM

## 2025-07-01 DIAGNOSIS — Z12.31 VISIT FOR SCREENING MAMMOGRAM: ICD-10-CM

## 2025-07-01 DIAGNOSIS — M85.89 OSTEOPENIA OF MULTIPLE SITES: ICD-10-CM

## 2025-07-01 DIAGNOSIS — Z79.811 LONG TERM (CURRENT) USE OF AROMATASE INHIBITORS: ICD-10-CM

## 2025-07-01 PROCEDURE — G0463 HOSPITAL OUTPT CLINIC VISIT: HCPCS | Performed by: INTERNAL MEDICINE

## 2025-07-01 PROCEDURE — 99213 OFFICE O/P EST LOW 20 MIN: CPT | Performed by: INTERNAL MEDICINE

## 2025-07-01 ASSESSMENT — PAIN SCALES - GENERAL: PAINLEVEL_OUTOF10: NO PAIN (0)

## 2025-07-01 NOTE — LETTER
7/1/2025      Elisha Gao  [address]  [facility] [address]      Dear Colleague,    Thank you for referring your patient, Elisha Gao, to the [facility]. Please see a copy of my visit note below.    Oncology Visit:  Date on this visit: Jul 1, 2025    Diagnosis: right breast cancer.    Primary Physician: Debi Pizarro     History Of Present Illness:  Ms. aGo is a 70 year old female with right breast cancer.  Routine screening mammogram on 3/17/2022 showed an asymmetry in the upper outer right breast.  Diagnostic breast imaging confirmed a 4 mm spiculated mass at 11:00, 4 cm from the nipple of the right breast.  Biopsy was consistent with a grade 2 invasive ductal carcinoma, ER strong in 98%, ND moderate in 40%, and HER2 negative by FISH and atypical ductal hyperplasia.  Patient underwent right breast lumpectomy and right axillary sentinel lymph node biopsy under the care of Dr. Garcia on 4/11/2022.  Pathology showed a grade 2 invasive ductal carcinoma measuring 9 mm with associated low grade DCIS.  Surgical margins were negative.  There was no lymphovascular invasion and two sentinel lymph nodes were benign.  She completed radiation, 2600 cGy in 5 fractions, to the right breast on 5/25/2022.  She has been on anastrozole since 6/15/2022.  She received 3 years of Zometa from 9/14/2022 - 9/2/2025.    Interval History:   Ms. Gao comes into clinic today for a breast cancer on treatment visit.  She is on adjuvant curative intent treatment with anastrozole.  In general, she is tolerating the medication well.  Since her last visit, she has had an increase in bilateral knee pain.  She has chronic osteoarthritis of the knees.  She has previously had multiple injections.  She notes that she is having increasing difficulty with some activities.  For example, getting off of a horse after horseback riding.  She denies hot flashes or mood disorder.  She has no new bone or joint  aches or pains.  She has no cough, shortness of breath, or chest pain.  She has no abdominal complaints.  She recently returned from a trip to [place].  Unfortunately, while there she got COVID.  Her symptoms were to the point where she was unsure whether she was going to be able to fly back on the day originally planned.  Thankfully, she was able to do so and her symptoms have now resolved.    Past Medical/Surgical History:  Past Medical History:   Diagnosis Date     Breast cancer (H) 03/2022     Chronic low back pain     for a couple years, worse at night, not evaluated     Depression      Essential tremor      Invasive ductal carcinoma of breast, female, right (H)      osteopenia     FRAX  11/2%   2022   Colonoscopy in 2012 w/o polyps, next due in 2022.    Past Surgical History:   Procedure Laterality Date     ABDOMEN SURGERY  1987     BREAST SURGERY       COLONOSCOPY  2020     GYN SURGERY  1987     HYSTERECTOMY  1987    endometriosis, ovarian cysts, hormones x 10  years     LASIK Bilateral      LUMPECTOMY, BREAST, LOCALIZED USING RADIOFREQUENCY IDENTIFICATION Right 04/11/2022    Procedure: LUMPECTOMY, BREAST, LOCALIZED USING RADIOFREQUENCY IDENTIFICATION, sentinel lymph node biopsy;  Surgeon: Santy Garcia MD;  Location: [facility] OR     Allergies:  Allergies as of 07/01/2025 - Reviewed 06/06/2025   Allergen Reaction Noted     Alendronate Nausea and Vomiting 05/26/2010     Raloxifene Nausea 05/26/2010     Risedronate Nausea and Vomiting 05/26/2010     Current Medications:  Current Outpatient Medications   Medication Sig Dispense Refill     anastrozole (ARIMIDEX) 1 MG tablet Take 1 tablet (1 mg) by mouth daily 90 tablet 3     buPROPion (WELLBUTRIN XL) 150 MG 24 hr tablet Take 1 tablet (150 mg) by mouth every morning. 90 tablet 3     citalopram (CELEXA) 40 MG tablet Take 1 tablet (40 mg) by mouth daily. 90 tablet 3     diclofenac (VOLTAREN) 50 MG EC tablet Take 1 tablet (50 mg) by mouth 2 times daily as needed for  moderate pain. 60 tablet 0     magnesium 250 MG tablet Take 1 tablet by mouth every morning        ondansetron (ZOFRAN ODT) 4 MG ODT tab Take 1 tablet (4 mg) by mouth every 8 hours as needed for nausea 18 tablet 0     simvastatin (ZOCOR) 20 MG tablet Take 1 tablet (20 mg) by mouth at bedtime. 90 tablet 3     Vitamin D, Cholecalciferol, 25 MCG (1000 UT) TABS Take 3 tablets by mouth every morning         Family and Social History:  See initial consultation dated 4/4/2022 for further details.  Breast Actionable panel on 4/4/2022 was negative for germline genetic mutation.    Physical Exam:  BP (!) 147/88 (BP Location: Right arm, Patient Position: Sitting, Cuff Size: Adult Regular)   Pulse 70   Temp 98.2  F (36.8  C) (Oral)   Resp 18   Wt 59.7 kg (131 lb 11.2 oz)   SpO2 99%   BMI 21.92 kg/m    General:  Well appearing adult female in NAD.  Alert and oriented x 3.  HEENT:  Normocephalic.  Sclera anicteric.    Lymph:  No palpable cervical, supraclavicular, or axillary LAD.  Chest:  CTAB, no wheezes or crackles.  CV:  RRR.  Nl S1 and S2.  No audible m/r/g.  Breast:  Right axillary and right upper outer breast incision are well healed. There is some fibrosis along the upper outer lumpectomy site unchanged from prior. There are no discretely palpable masses in either breast.  Bilateral nipples are everted and without discharge.   Abd:  Soft/ND  Ext:  No edema of the bilateral lower extremities.   Neuro:  Cranial nerves grossly intact.  (+) resting tremor.  Gait stable.  Psych:  Mood and affect appear normal.  Skin:  No visible concerning skin rashes or lesions.    Laboratory/Imaging Studies  I personally reviewed the below images while in clinic today:    1/6/2025 Bilateral screening mammograms:  Findings: There are scattered areas of fibroglandular density.  Right breast conservation therapy changes.  There is no radiographic evidence of malignancy.                                                                     IMPRESSION: ACR BI-RADS Category 2: Benign    ASSESSMENT/PLAN:  69 yo female with stage Ia, [id number], grade 2, ER/SC positive, HER-2 negative invasive ductal carcinoma of the right breast.   She is s/p right breast lumpectomy and right axillary sentinel lymph node procedure.    1.  Right breast cancer:  Ms. Gao is 3 years, 2.5 months out from excision of a right breast cancer.  She is on adjuvant curative intent treatment with anastrozole. She is tolerating it well. Plan is to treat with a total 5 years of endocrine therapy (through 6/15/2027).    There are no concerning signs or symptoms of recurrence today.   - Bilateral screening mammograms and return clinic visit 1/7/2026 or later.    2.  Osteopenia:  DEXA bone density scan 5/6/2024 with lowest T-score of -2.1 c/w osteopenia.  She is at risk of osteoporosis on anastrozole therapy.  - She is s/p treatment with 3 years of Zometa from 9/14/2022 - 9/2/2025 (received last infusion on 3/4/2025).  Will hold further Zometa.  - Take calcium 1200 mg and vitamin D 1000 IUs daily  - Walk 30 minutes daily.    3.  Follow Up:  - Bilateral screening mammograms and return clinic visit 1/7/2026 or later.    I spent 26 minutes on the date of the encounter doing chart review, review of test results, interpretation of tests, patient visit, and documentation           Again, thank you for allowing me to participate in the care of your patient.        Sincerely,        Gardenia Kaplan MD    Electronically signed

## 2025-07-01 NOTE — NURSING NOTE
"Oncology Rooming Note    July 1, 2025 9:10 AM   Elisha Gao is a 70 year old female who presents for:    Chief Complaint   Patient presents with    Oncology Clinic Visit     Invasive ductal carcinoma of breast     Initial Vitals: BP (!) 147/88 (BP Location: Right arm, Patient Position: Sitting, Cuff Size: Adult Regular)   Pulse 70   Temp 98.2  F (36.8  C) (Oral)   Resp 18   Wt 59.7 kg (131 lb 11.2 oz)   SpO2 99%   BMI 21.92 kg/m   Estimated body mass index is 21.92 kg/m  as calculated from the following:    Height as of 12/5/24: 1.651 m (5' 5\").    Weight as of this encounter: 59.7 kg (131 lb 11.2 oz). Body surface area is 1.65 meters squared.  No Pain (0) Comment: Data Unavailable   No LMP recorded. Patient has had a hysterectomy.  Allergies reviewed: Yes  Medications reviewed: Yes    Medications: Medication refills not needed today.  Pharmacy name entered into [facility]: [facility] [address] [place] [address]    Frailty Screening:   Is the patient here for a new oncology consult visit in cancer care? 2. No    PHQ9:  Did this patient require a PHQ9?: No      Clinical concerns: none.      Haider Oseguera"

## 2025-07-10 ENCOUNTER — THERAPY VISIT (OUTPATIENT)
Age: 70
End: 2025-07-10
Payer: MEDICARE

## 2025-07-10 DIAGNOSIS — M17.11 PRIMARY OSTEOARTHRITIS OF RIGHT KNEE: ICD-10-CM

## 2025-07-10 DIAGNOSIS — M76.50 PATELLAR TENDINOSIS: Primary | ICD-10-CM

## 2025-07-10 DIAGNOSIS — M17.12 PRIMARY OSTEOARTHRITIS OF LEFT KNEE: ICD-10-CM

## 2025-07-10 ASSESSMENT — ACTIVITIES OF DAILY LIVING (ADL)
AS_A_RESULT_OF_YOUR_KNEE_INJURY,_HOW_WOULD_YOU_RATE_YOUR_CURRENT_LEVEL_OF_DAILY_ACTIVITY?: NEARLY NORMAL
HOW_WOULD_YOU_RATE_THE_CURRENT_FUNCTION_OF_YOUR_KNEE_DURING_YOUR_USUAL_DAILY_ACTIVITIES_ON_A_SCALE_FROM_0_TO_100_WITH_100_BEING_YOUR_LEVEL_OF_KNEE_FUNCTION_PRIOR_TO_YOUR_INJURY_AND_0_BEING_THE_INABILITY_TO_PERFORM_ANY_OF_YOUR_USUAL_DAILY_ACTIVITIES?: 80
KNEEL ON THE FRONT OF YOUR KNEE: ACTIVITY IS NOT DIFFICULT
PAIN: THE SYMPTOM AFFECTS MY ACTIVITY SLIGHTLY
HOW_WOULD_YOU_RATE_THE_OVERALL_FUNCTION_OF_YOUR_KNEE_DURING_YOUR_USUAL_DAILY_ACTIVITIES?: NEARLY NORMAL
KNEE_ACTIVITY_OF_DAILY_LIVING_SUM: 63
LIMPING: I DO NOT HAVE THE SYMPTOM
SIT WITH YOUR KNEE BENT: ACTIVITY IS MINIMALLY DIFFICULT
GO DOWN STAIRS: ACTIVITY IS NOT DIFFICULT
RISE FROM A CHAIR: ACTIVITY IS MINIMALLY DIFFICULT
KNEE_ACTIVITY_OF_DAILY_LIVING_SCORE: 90
SWELLING: I DO NOT HAVE THE SYMPTOM
STAND: ACTIVITY IS NOT DIFFICULT
SQUAT: ACTIVITY IS NOT DIFFICULT
RAW_SCORE: 63
GO UP STAIRS: ACTIVITY IS NOT DIFFICULT
GIVING WAY, BUCKLING OR SHIFTING OF KNEE: I DO NOT HAVE THE SYMPTOM
WEAKNESS: I HAVE THE SYMPTOM BUT IT DOES NOT AFFECT MY ACTIVITY
WALK: ACTIVITY IS NOT DIFFICULT
STIFFNESS: THE SYMPTOM AFFECTS MY ACTIVITY SLIGHTLY

## 2025-07-10 NOTE — PROGRESS NOTES
07/10/25 0500   Appointment Info   Signing clinician's name / credentials Brody Hernandez DPT   Total/Authorized Visits E[employer]   Visits Used 4   Medical Diagnosis Patellar tendinosis; Primary osteoarthritis of right knee; Primary osteoarthritis of left knee   PT Tx Diagnosis Knee pain, right   Progress Note/Certification   Start of Care Date 04/24/25   Onset of illness/injury or Date of Surgery 12/24/24   Therapy Frequency 1x/week   Predicted Duration 10 weeks   Certification date from 07/04/25   Certification date to 07/10/25   Progress Note Due Date 07/10/25   Progress Note Completed Date 04/24/25   PT Goal 1   Goal Identifier Ambulation   Goal Description Patient will report ability to ambulate one mile with right knee pain <3/10.   Rationale to maximize safety and independence with performance of ADLs and functional tasks;to maximize safety and independence within the community   Goal Progress Goal met.   Target Date 06/19/25   Date Met 07/10/25   PT Goal 2   Goal Identifier Stairs   Goal Description Patient will report ability to ascend and descend one flight of stairs with right knee pain 1/10 or less.   Goal Progress Goal not met. 3/10 pain on stairs.   Target Date 06/19/25   PT Goal 3   Goal Identifier Activity tolerance   Goal Description Patient will report ability to golf nine holes with knee pain <2/10.   Goal Progress Goal not met. 3-4/10 pain with golf.   Target Date 07/03/25   Subjective Report   Subjective Report Reports her knee is still about the same. She feels her medication mix may be contributing to lack of progress and feels ready to discharge from PT.   Therapeutic Procedure/Exercise   Therapeutic Procedures: strength, endurance, ROM, flexibility minutes (84552) 25   Ther Proc 1 Upright bicycle   Ther Proc 1 - Details 7 minutes, SH3, L1; warmup   Ther Proc 2 Squat   Ther Proc 2 - Details 2x10, 9/18 pounds   PTRx Ther Proc 1 Occitan deadlift   PTRx Ther Proc 1 - Details 2x10, 9/18 pounds;  demonstration and verbal cues for technique   PTRx Ther Proc 2 Walking lunges   PTRx Ther Proc 2 - Details 1x6 per side; demonstration and verbal cues for technique   Skilled Intervention Review and progression of HEP; addition of RDL and lunge to final HEP.   Patient Response/Progress Tolerates well, ready for d/c today.   Therapeutic Activity   Therapeutic Activities: dynamic activities to improve functional performance minutes (21410) 5   Ther Act 1 Patellar mobilization, inferior   Ther Act 1 - Details Demonstration and review for final HEP.   PTRx Ther Act 1 Shoe insole   PTRx Ther Act 1 - Details Review of recommendation to trial off-the-shelf shoe insole to see if gentle pronation control may help decrease knee pain.   Skilled Intervention See details.   Patient Response/Progress Endorses understanding.   Education   Learner/Method Patient;Listening;Reading;Demonstration;No Barriers to Learning   Plan   Home program PTRx (Printed)   Updates to plan of care Added RDL, lunge.   Plan for next session Discharged from PT.   Total Session Time   Timed Code Treatment Minutes 30   Total Treatment Time (sum of timed and untimed services) 30         [facility]                                                                                   OUTPATIENT PHYSICAL THERAPY    PLAN OF TREATMENT FOR OUTPATIENT REHABILITATION   Patient's Last Name, First Name, Elisha Crowley YOB: 1955   Provider's Name   [facility]   Medical Record No.  [id number]     Onset Date: 12/24/24  Start of Care Date: 04/24/25     Medical Diagnosis:  Patellar tendinosis; Primary osteoarthritis of right knee; Primary osteoarthritis of left knee      PT Treatment Diagnosis:  Knee pain, right Plan of Treatment  Frequency/Duration: 1x/week/ 10 weeks    Certification date from 07/04/25 to 07/10/25         See note for plan of treatment details and functional goals      Brody Hernandez                         I CERTIFY THE NEED FOR THESE SERVICES FURNISHED UNDER        THIS PLAN OF TREATMENT AND WHILE UNDER MY CARE     (Physician attestation of this document indicates review and certification of the therapy plan).              Referring Provider:  Joel Blunt    Initial Assessment  See Epic Evaluation- Start of Care Date: 04/24/25            DISCHARGE  Reason for Discharge: Patient chooses to discontinue therapy. Feels her knee pain has reached a plateau and is ready for discharge today. Requires a re-certification for treatment provided today.    Discharge Plan: Patient to continue home program.    Referring Provider:  Joel Blunt

## 2025-08-20 ENCOUNTER — MYC REFILL (OUTPATIENT)
Dept: ONCOLOGY | Facility: CLINIC | Age: 70
End: 2025-08-20
Payer: MEDICARE

## 2025-08-20 DIAGNOSIS — Z17.0 MALIGNANT NEOPLASM OF UPPER-OUTER QUADRANT OF RIGHT BREAST IN FEMALE, ESTROGEN RECEPTOR POSITIVE (H): ICD-10-CM

## 2025-08-20 DIAGNOSIS — C50.411 MALIGNANT NEOPLASM OF UPPER-OUTER QUADRANT OF RIGHT BREAST IN FEMALE, ESTROGEN RECEPTOR POSITIVE (H): ICD-10-CM

## 2025-08-25 DIAGNOSIS — C50.411 MALIGNANT NEOPLASM OF UPPER-OUTER QUADRANT OF RIGHT BREAST IN FEMALE, ESTROGEN RECEPTOR POSITIVE (H): ICD-10-CM

## 2025-08-25 DIAGNOSIS — Z17.0 MALIGNANT NEOPLASM OF UPPER-OUTER QUADRANT OF RIGHT BREAST IN FEMALE, ESTROGEN RECEPTOR POSITIVE (H): ICD-10-CM

## 2025-08-25 RX ORDER — ANASTROZOLE 1 MG/1
1 TABLET ORAL DAILY
Qty: 90 TABLET | Refills: 3 | Status: SHIPPED | OUTPATIENT
Start: 2025-08-25

## 2025-08-27 RX ORDER — ANASTROZOLE 1 MG/1
1 TABLET ORAL DAILY
Qty: 90 TABLET | Refills: 0 | OUTPATIENT
Start: 2025-08-27

## (undated) DEVICE — GLOVE PROTEXIS POWDER FREE SMT 8.0  2D72PT80X

## (undated) DEVICE — SU PDS II 4-0 FS-2 27" Z422H

## (undated) DEVICE — BNDG COHESIVE 2"X5YDS UNSTERILE ASSORTED COLORS LF 8962

## (undated) DEVICE — SU SILK 3-0 SH 30" K832H

## (undated) DEVICE — LINEN TOWEL PACK X5 5464

## (undated) DEVICE — BASIN SET SINGLE STERILE 13752-624

## (undated) DEVICE — PACK MINOR CUSTOM ASC

## (undated) DEVICE — MARKER MARGIN MARKER STD 6 COLOR SGL USE MMS6

## (undated) DEVICE — ESU GROUND PAD ADULT W/CORD E7507

## (undated) DEVICE — ADH SKIN CLOSURE PREMIERPRO EXOFIN 1.0ML 3470

## (undated) DEVICE — PAD CHUX UNDERPAD 30X30"

## (undated) DEVICE — CLIP HORIZON MED BLUE 002200

## (undated) DEVICE — SYR 05ML LL W/O NDL

## (undated) DEVICE — DRAPE SHEET MED 44X70" 9355

## (undated) DEVICE — CLIP HORIZON SM RED WIDE SLOT 001201

## (undated) DEVICE — DRAPE LAP TRANSVERSE 29421

## (undated) DEVICE — DRAPE IOBAN INCISE 23X17" 6650EZ

## (undated) DEVICE — SOL NACL 0.9% IRRIG 500ML BOTTLE 2F7123

## (undated) DEVICE — SOL WATER IRRIG 500ML BOTTLE 2F7113

## (undated) DEVICE — SU VICRYL 3-0 SH 27" J316H

## (undated) DEVICE — NDL 25GA 2"  8881200441

## (undated) DEVICE — SUCTION MANIFOLD NEPTUNE 2 SYS 1 PORT 702-025-000

## (undated) DEVICE — NDL 27GA 1.25" 305136

## (undated) DEVICE — PREP CHLORAPREP 26ML TINTED ORANGE  260815

## (undated) DEVICE — ESU ELEC BLADE 2.75" COATED/INSULATED E1455

## (undated) DEVICE — PREP PAD ALCOHOL 6818

## (undated) DEVICE — NDL BLUNT 18GA 1" W/O FILTER 305181

## (undated) RX ORDER — BETAMETHASONE SODIUM PHOSPHATE AND BETAMETHASONE ACETATE 3; 3 MG/ML; MG/ML
INJECTION, SUSPENSION INTRA-ARTICULAR; INTRALESIONAL; INTRAMUSCULAR; SOFT TISSUE
Status: DISPENSED
Start: 2024-08-05

## (undated) RX ORDER — ONDANSETRON 2 MG/ML
INJECTION INTRAMUSCULAR; INTRAVENOUS
Status: DISPENSED
Start: 2022-04-11

## (undated) RX ORDER — PROPOFOL 10 MG/ML
INJECTION, EMULSION INTRAVENOUS
Status: DISPENSED
Start: 2022-04-11

## (undated) RX ORDER — LIDOCAINE HYDROCHLORIDE 20 MG/ML
INJECTION, SOLUTION EPIDURAL; INFILTRATION; INTRACAUDAL; PERINEURAL
Status: DISPENSED
Start: 2022-04-11

## (undated) RX ORDER — TRIAMCINOLONE ACETONIDE 40 MG/ML
INJECTION, SUSPENSION INTRA-ARTICULAR; INTRAMUSCULAR
Status: DISPENSED
Start: 2024-12-05

## (undated) RX ORDER — TRIAMCINOLONE ACETONIDE 40 MG/ML
INJECTION, SUSPENSION INTRA-ARTICULAR; INTRAMUSCULAR
Status: DISPENSED
Start: 2025-05-01

## (undated) RX ORDER — LIDOCAINE HYDROCHLORIDE AND EPINEPHRINE 10; 10 MG/ML; UG/ML
INJECTION, SOLUTION INFILTRATION; PERINEURAL
Status: DISPENSED
Start: 2025-06-12

## (undated) RX ORDER — TRIAMCINOLONE ACETONIDE 40 MG/ML
INJECTION, SUSPENSION INTRA-ARTICULAR; INTRAMUSCULAR
Status: DISPENSED
Start: 2023-05-15

## (undated) RX ORDER — CEFAZOLIN SODIUM 1 G/3ML
INJECTION, POWDER, FOR SOLUTION INTRAMUSCULAR; INTRAVENOUS
Status: DISPENSED
Start: 2022-04-11

## (undated) RX ORDER — LIDOCAINE HYDROCHLORIDE 10 MG/ML
INJECTION, SOLUTION EPIDURAL; INFILTRATION; INTRACAUDAL; PERINEURAL
Status: DISPENSED
Start: 2023-05-15

## (undated) RX ORDER — LIDOCAINE HYDROCHLORIDE 10 MG/ML
INJECTION, SOLUTION EPIDURAL; INFILTRATION; INTRACAUDAL; PERINEURAL
Status: DISPENSED
Start: 2025-05-01

## (undated) RX ORDER — LIDOCAINE HYDROCHLORIDE 10 MG/ML
INJECTION, SOLUTION EPIDURAL; INFILTRATION; INTRACAUDAL; PERINEURAL
Status: DISPENSED
Start: 2024-08-05

## (undated) RX ORDER — LIDOCAINE HYDROCHLORIDE 10 MG/ML
INJECTION, SOLUTION EPIDURAL; INFILTRATION; INTRACAUDAL; PERINEURAL
Status: DISPENSED
Start: 2024-12-05

## (undated) RX ORDER — DEXAMETHASONE SODIUM PHOSPHATE 4 MG/ML
INJECTION, SOLUTION INTRA-ARTICULAR; INTRALESIONAL; INTRAMUSCULAR; INTRAVENOUS; SOFT TISSUE
Status: DISPENSED
Start: 2022-04-11

## (undated) RX ORDER — LIDOCAINE HYDROCHLORIDE 10 MG/ML
INJECTION, SOLUTION EPIDURAL; INFILTRATION; INTRACAUDAL; PERINEURAL
Status: DISPENSED
Start: 2023-12-14

## (undated) RX ORDER — FENTANYL CITRATE 50 UG/ML
INJECTION, SOLUTION INTRAMUSCULAR; INTRAVENOUS
Status: DISPENSED
Start: 2022-04-11

## (undated) RX ORDER — TRIAMCINOLONE ACETONIDE 40 MG/ML
INJECTION, SUSPENSION INTRA-ARTICULAR; INTRAMUSCULAR
Status: DISPENSED
Start: 2023-12-14